# Patient Record
Sex: MALE | Race: WHITE | Employment: FULL TIME | URBAN - METROPOLITAN AREA
[De-identification: names, ages, dates, MRNs, and addresses within clinical notes are randomized per-mention and may not be internally consistent; named-entity substitution may affect disease eponyms.]

---

## 2017-01-24 ENCOUNTER — OFFICE VISIT (OUTPATIENT)
Dept: FAMILY MEDICINE | Facility: CLINIC | Age: 47
End: 2017-01-24
Payer: COMMERCIAL

## 2017-01-24 VITALS
SYSTOLIC BLOOD PRESSURE: 135 MMHG | HEART RATE: 90 BPM | WEIGHT: 173 LBS | BODY MASS INDEX: 26.68 KG/M2 | RESPIRATION RATE: 15 BRPM | TEMPERATURE: 99 F | DIASTOLIC BLOOD PRESSURE: 85 MMHG | OXYGEN SATURATION: 98 %

## 2017-01-24 DIAGNOSIS — H66.002 ACUTE SUPPURATIVE OTITIS MEDIA OF LEFT EAR WITHOUT SPONTANEOUS RUPTURE OF TYMPANIC MEMBRANE, RECURRENCE NOT SPECIFIED: Primary | ICD-10-CM

## 2017-01-24 PROCEDURE — 99213 OFFICE O/P EST LOW 20 MIN: CPT | Performed by: PHYSICIAN ASSISTANT

## 2017-01-24 RX ORDER — FLUTICASONE PROPIONATE 50 MCG
1-2 SPRAY, SUSPENSION (ML) NASAL DAILY
Qty: 3 BOTTLE | Refills: 0 | Status: SHIPPED | OUTPATIENT
Start: 2017-01-24 | End: 2017-06-15

## 2017-01-24 RX ORDER — AZITHROMYCIN 250 MG/1
TABLET, FILM COATED ORAL
Qty: 6 TABLET | Refills: 0 | Status: SHIPPED | OUTPATIENT
Start: 2017-01-24 | End: 2017-05-19

## 2017-01-24 ASSESSMENT — PAIN SCALES - GENERAL: PAINLEVEL: NO PAIN (0)

## 2017-01-24 NOTE — NURSING NOTE
"Chief Complaint   Patient presents with     Cough     c/o cough, congestion, post nasal drainage and plugged ear       Initial /85 mmHg  Pulse 90  Temp(Src) 99  F (37.2  C) (Oral)  Resp 15  Wt 173 lb (78.472 kg)  SpO2 98% Estimated body mass index is 26.68 kg/(m^2) as calculated from the following:    Height as of 11/11/16: 5' 7.5\" (1.715 m).    Weight as of this encounter: 173 lb (78.472 kg).  BP completed using cuff size: regular    Patient and/or MA were masked during rooming process  Charley Ruvalcaba MA        "

## 2017-01-24 NOTE — PROGRESS NOTES
SUBJECTIVE:                                                    Marcus Delacruz is a 46 year old male who presents to clinic today for the following health issues:      RESPIRATORY SYMPTOMS      Duration: 6 days    Description  nasal congestion, rhinorrhea, facial pain/pressure, cough, ear plugged left and headache    Severity: moderate    Accompanying signs and symptoms: None    History (predisposing factors):  none    Precipitating or alleviating factors: None    Therapies tried and outcome:  none             Allergies   Allergen Reactions     Azo [Phenazopyridine] Hives     Flomax [Quinazolines] Hives     Levaquin [Levofloxacin Hemihydrate] Hives     Amoxicillin Hives     Detrol [Tolterodine Tartrate] Hives       Past Medical History   Diagnosis Date     NO ACTIVE PROBLEMS      Kidney congenitally absent, 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 May double dosage if needed for ED     No current facility-administered medications on file prior to visit.    Social History   Substance Use Topics     Smoking status: Never Smoker      Smokeless tobacco: Former User      Comment: NON SMOKING HOME     Alcohol Use: Yes       ROS:  Consitutional: As above  ENT: As above  Respiratory: As above    OBJECTIVE:  /85 mmHg  Pulse 90  Temp(Src) 99  F (37.2  C) (Oral)  Resp 15  Wt 173 lb (78.472 kg)  SpO2 98%  GENERAL APPEARANCE: healthy, alert and no distress  EYES: conjunctiva clear  EARS: No cerumen.   Ear canals w/o erythema. L TM dull, red, retracted.    NOSE/MOUTH: Nose and mouth without ulcers, erythema or lesions  SINUSES: Mild  maxillary sinus tenderness.  THROAT: Mild erythema w/o tonsillar enlargement . No exudates  NECK: supple, nontender, no lymphadenopathy  RESP: lungs clear to auscultation - no rales, rhonchi or wheezes  CV: regular rates and rhythm, normal S1 S2, no murmur noted  NEURO: awake, alert      ASSESSMENT: Well appearing.    ICD-10-CM    1. Acute suppurative otitis media of left ear without spontaneous rupture of tympanic membrane, recurrence not specified H66.002 azithromycin (ZITHROMAX Z-JIM) 250 MG tablet     fluticasone (FLONASE) 50 MCG/ACT spray     PLAN:  Lots of rest and fluids.  RTC if any worsening symptoms or if not improving.    Latha Acevedo PA-C

## 2017-02-02 ENCOUNTER — TRANSFERRED RECORDS (OUTPATIENT)
Dept: HEALTH INFORMATION MANAGEMENT | Facility: CLINIC | Age: 47
End: 2017-02-02

## 2017-02-10 ENCOUNTER — TELEPHONE (OUTPATIENT)
Dept: FAMILY MEDICINE | Facility: CLINIC | Age: 47
End: 2017-02-10

## 2017-02-10 NOTE — TELEPHONE ENCOUNTER
[facility] attempted to contact patient to engage in Case Management Services due to his Chronic Illness - They were unsuccessful in reaching him.

## 2017-05-08 ENCOUNTER — TRANSFERRED RECORDS (OUTPATIENT)
Dept: HEALTH INFORMATION MANAGEMENT | Facility: CLINIC | Age: 47
End: 2017-05-08

## 2017-05-17 DIAGNOSIS — I10 ESSENTIAL HYPERTENSION WITH GOAL BLOOD PRESSURE LESS THAN 140/90: ICD-10-CM

## 2017-05-18 ENCOUNTER — TELEPHONE (OUTPATIENT)
Dept: FAMILY MEDICINE | Facility: CLINIC | Age: 47
End: 2017-05-18

## 2017-05-18 ENCOUNTER — TELEPHONE (OUTPATIENT)
Dept: NURSING | Facility: CLINIC | Age: 47
End: 2017-05-18

## 2017-05-18 RX ORDER — AMLODIPINE BESYLATE 5 MG/1
TABLET ORAL
Qty: 90 TABLET | Refills: 1 | Status: SHIPPED | OUTPATIENT
Start: 2017-05-18 | End: 2017-11-13

## 2017-05-18 NOTE — TELEPHONE ENCOUNTER
Call Type: Triage Call    Presenting Problem: Pt calling he wants to schedule an appt for  tomorrow in the clinic.  This past week he has had right sided flank  pain.  He has seen his urologist who has done x rays and CT scan and  can't find any kidney stones.  Pt has a hx of kidney stones.  Pt  reports the pain is very similar to kidney stone pain.  His  urologist referred him back to his primary to do further workup.  Pt  does not want to go to ER or U/C he wants office appt.  He reports  he is on pain pills and they help, he is nauseated but hydrated.  Triage Note:  Guideline Title: Abdominal or Pelvic Pain  Recommended Disposition: See Provider within 4 hours  Original Inclination: Would have called clinic  Override Disposition:  Intended Action: See /Bony Appt  Physician Contacted: No  Generalized abdominal pain that progresses to localized pain AND any of the  following: loss of appetite, vomiting starting after pain, any fever, OR unable  to carry out normal activities ?  YES  Pain described as deep, boring, or  tearing ? NO  Swallowed alkali or button battery ? NO  Swallowed sharp object (pin, needle, piece of glass) ? NO  Following a therapeutic OR elective  ? NO  New or worsening signs and symptoms that may indicate shock ? NO  Pregnant AND injury to abdomen ? NO  Pregnant AND abdominal pain/cramping OR back pain ? NO  Pregnant, less than 20 weeks gestation AND vaginal bleeding ? NO  Pregnant, more than 20 weeks gestation AND vaginal bleeding ? NO  Pregnant, gestation less than 20 weeks AND signs of labor ? NO  Pregnant, 20-37 weeks gestation AND signs of labor ? NO  Pregnant, gestation more than 37 weeks AND signs of labor ? NO  Pregnant AND heartburn ? NO  Female of childbearing age having unprotected sexual intercourse or inconsistently  using birth control AND absent, delayed or unusual menstrual period or abnormal  vaginal bleeding ? NO  Sudden onset of pain in testicle(s), groin, or lower  abdomen AND testicle(s)  swollen or tender to touch ? NO  Known or suspected food poisoning and symptoms do not improve after 24 hours of  home care ? NO  Following ingestion of toxic or caustic substance ? NO  Over 50 years of age AND new onset (first episode) unbearable back or abdominal  pain ? NO  Known abdominal aneurysm (swollen or ballooning aorta) AND sudden onset of  unbearable abdominal pain ? NO  Unable to reduce diagnosed hernia AND has severe pain, nausea/vomiting or any  fever ? NO  Abdominal pain and sickle cell disease, porphyria, or diabetes ? NO  Unbearable abdominal/pelvic pain ? NO  Temperature of 101.5 F (38.6 C) or greater that has not responded to 24 hours of  home care measures ? NO  Food or foreign body feels stuck in esophagus. ? NO  GI bleeding, more than streaks or scant amount of blood or passing black tarry  stool(s) ? NO  Chest discomfort associated with shortness of breath, sweating, odd heartbeats or  different heart rate, nausea, vomiting, lightheadedness, or fainting lasting 5 or  more minutes now or within the last hour ? NO  Chest pain spreading to the shoulders, neck, jaw, in one or both arms, stomach or  back lasting 5 or more minutes now or within the last hour. Pain is NOT  associated with taking a deep breath or a productive cough, movement, or touch to  a localized area. ? NO  Pressure, fullness, squeezing sensation or pain anywhere in the chest lasting 5 or  more minutes now or within the last hour. Pain is NOT associated with taking a  deep breath or a productive cough, movement, or touch to a localized area on the  chest. ? NO  Abdominal pain, any blood in vomitus or stool, abdominal bloating, new fever or  other cautionary symptoms began after GI surgery or procedure and is lasting  longer than defined in provider specified discharge information. ? NO  Swallowed an object longer than 2 inches (5 cm) or wider than 3/4 inch (2 cm) wide  ? NO  Generalized or localized pain  that becomes severe with movement, standing up  straight or coughing ? NO  Injury to abdomen or pelvis ? NO  New or worsening breathing problems that have not been evaluated OR without a  treatment plan ? NO  Recent childbirth or miscarriage ? NO  Any temperature elevation in an immunocompromised individual OR frail elderly with  signs of dehydration ? NO  Physician Instructions:  Care Advice: Another adult should drive.  Pain medication or laxatives should not be taken until symptoms are  evaluated.  Do not eat or drink anything until evaluated by provider.  Call  if signs and symptoms of shock develop (such as unable to  stand due to faintness, dizziness, or lightheadedness  new onset of confusion  slow to respond or difficult to awaken  skin is pale, gray, cool, or moist to touch  severe weakness  loss of consciousness).  Write down provider's name. List or place the following in a bag for  transport with the patient: current prescription and/or nonprescription  medications  alternative treatments, therapies and medications  and street drugs.

## 2017-05-19 ENCOUNTER — TELEPHONE (OUTPATIENT)
Dept: FAMILY MEDICINE | Facility: CLINIC | Age: 47
End: 2017-05-19

## 2017-05-19 ENCOUNTER — OFFICE VISIT (OUTPATIENT)
Dept: FAMILY MEDICINE | Facility: CLINIC | Age: 47
End: 2017-05-19
Payer: COMMERCIAL

## 2017-05-19 ENCOUNTER — RADIANT APPOINTMENT (OUTPATIENT)
Dept: GENERAL RADIOLOGY | Facility: CLINIC | Age: 47
End: 2017-05-19
Attending: FAMILY MEDICINE
Payer: COMMERCIAL

## 2017-05-19 VITALS
WEIGHT: 166 LBS | DIASTOLIC BLOOD PRESSURE: 82 MMHG | TEMPERATURE: 98.3 F | SYSTOLIC BLOOD PRESSURE: 120 MMHG | HEART RATE: 89 BPM | BODY MASS INDEX: 25.62 KG/M2 | OXYGEN SATURATION: 98 %

## 2017-05-19 DIAGNOSIS — R10.31 ABDOMINAL PAIN, RIGHT LOWER QUADRANT: Primary | ICD-10-CM

## 2017-05-19 DIAGNOSIS — R10.31 ABDOMINAL PAIN, RIGHT LOWER QUADRANT: ICD-10-CM

## 2017-05-19 DIAGNOSIS — N20.0 KIDNEY STONES: Primary | ICD-10-CM

## 2017-05-19 DIAGNOSIS — N52.8 OTHER MALE ERECTILE DYSFUNCTION: ICD-10-CM

## 2017-05-19 LAB
ERYTHROCYTE [DISTWIDTH] IN BLOOD BY AUTOMATED COUNT: 13.1 % (ref 10–15)
HCT VFR BLD AUTO: 47.6 % (ref 40–53)
HGB BLD-MCNC: 15.8 G/DL (ref 13.3–17.7)
MCH RBC QN AUTO: 29.4 PG (ref 26.5–33)
MCHC RBC AUTO-ENTMCNC: 33.2 G/DL (ref 31.5–36.5)
MCV RBC AUTO: 89 FL (ref 78–100)
PLATELET # BLD AUTO: 206 10E9/L (ref 150–450)
RBC # BLD AUTO: 5.37 10E12/L (ref 4.4–5.9)
WBC # BLD AUTO: 7.1 10E9/L (ref 4–11)

## 2017-05-19 PROCEDURE — 99214 OFFICE O/P EST MOD 30 MIN: CPT | Performed by: FAMILY MEDICINE

## 2017-05-19 PROCEDURE — 74020 XR ABDOMEN 2 VW: CPT

## 2017-05-19 PROCEDURE — 36415 COLL VENOUS BLD VENIPUNCTURE: CPT | Performed by: FAMILY MEDICINE

## 2017-05-19 PROCEDURE — 85027 COMPLETE CBC AUTOMATED: CPT | Performed by: FAMILY MEDICINE

## 2017-05-19 RX ORDER — SILDENAFIL CITRATE 20 MG/1
20 TABLET ORAL DAILY PRN
Qty: 20 TABLET | Refills: 1 | Status: SHIPPED | OUTPATIENT
Start: 2017-05-19 | End: 2017-05-31

## 2017-05-19 RX ORDER — OXYCODONE AND ACETAMINOPHEN 5; 325 MG/1; MG/1
1 TABLET ORAL EVERY 8 HOURS PRN
Qty: 12 TABLET | Refills: 0 | Status: SHIPPED | OUTPATIENT
Start: 2017-05-19 | End: 2017-11-02

## 2017-05-19 NOTE — PROGRESS NOTES
SUBJECTIVE:  Marcus Delacruz, a 46 year old male scheduled an appointment to discuss the following issues:  Abdominal pain/flank pain x2 weeks.   Seen urology and had ct and not related to kidney stones. ?possible hernia. No testicle pain/mass.  No dysuria. Nauseated. No emesis. No black or bloody stools. No fevers or chills. right LQ area.   Squeezing sensation. No changes with position.   Still with gall bladder and appendix. No family history gallstones. No diarrhea/constipation..  Would like small amount of pain med (percocet helpful in past) and refill on viagra. Denies chest pain or shortness of breath.   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     Kidney stones 9/16/2011     NO ACTIVE PROBLEMS        Past Surgical History:   Procedure Laterality Date     EXTRACORPOREAL SHOCK WAVE LITHOTRIPSY (ESWL)  .3.4.2016     GENITOURINARY SURGERY  Kidney Stones    21 Lithotripsy procedures     ORTHOPEDIC SURGERY  2015    right shoulder rotor cuff     TONSILLECTOMY  2008       Family History   Problem Relation Age of Onset     CANCER Father 68     pancreatic cancer     DIABETES Father      Other Cancer Father        Social History   Substance Use Topics     Smoking status: Never Smoker     Smokeless tobacco: Former User      Comment: NON SMOKING HOME     Alcohol use Yes       ROS:    OBJECTIVE:  /82  Pulse 89  Temp 98.3  F (36.8  C) (Oral)  Wt 166 lb (75.3 kg)  SpO2 98%  BMI 25.62 kg/m2  EXAM:  GENERAL APPEARANCE: healthy, alert and no distress  EYES: EOMI,  PERRL  HENT: ear canals and TM's normal and nose and mouth without ulcers or lesions  NECK: no adenopathy, no asymmetry, masses, or scars and thyroid normal to palpation  RESP: lungs clear to auscultation - no rales, rhonchi or wheezes  CV: regular rates and rhythm, normal S1 S2, no S3 or S4 and no murmur, click or rub -  ABDOMEN:  soft, nontender, no  HSM or masses and bowel sounds normal  ABDOMEN: mild tenderness right LQ. No guarding/rebound.   MS: extremities normal- no gross deformities noted, no evidence of inflammation in joints, FROM in all extremities.  SKIN: no suspicious lesions or rashes  NEURO: Normal strength and tone, sensory exam grossly normal, mentation intact and speech normal  PSYCH: mentation appears normal and affect normal/bright    ASSESSMENT / PLAN:  (R10.31) Abdominal pain, right lower quadrant  (primary encounter diagnosis)  Comment: hernia vs constipation vs ? Normal cbc  Plan: XR Abdomen 2 Views, CBC with platelets, GENERAL        SURG ADULT REFERRAL, oxyCODONE-acetaminophen         (PERCOCET) 5-325 MG per tablet        Follow-up surgery if not improving over weekend with miralax x2-3. Drinks lots of water so more fiber in diet. Might need colonoscopy if no improving too. .Call/email with questions/concerns. Reveiwed risks and side effects of medication. To ER if worsening pain/ emesis/ fevers or chills/etc.     (N52.8) Other male erectile dysfunction  Comment: viagra helpful in past  Plan: sildenafil (REVATIO/VIAGRA) 20 MG tablet        To ER if chest pain, shortness of breath , prolonged erections      Rj Mejia

## 2017-05-19 NOTE — TELEPHONE ENCOUNTER
Please call patient. Radiology read xray as possible distal urethral stone. At only 2mm should past if is in fact a stone. Stick would like patient to see surgery if not better next week and maybe call urology too.     Patient:   Marcus Delacruz   [phone] (home)     Provider:   Rj Mejia MD   Please call/evisit with questions or concern

## 2017-05-19 NOTE — NURSING NOTE
"Chief Complaint   Patient presents with     Abdominal Pain     Flank Pain       Initial /82  Pulse 89  Temp 98.3  F (36.8  C) (Oral)  Wt 166 lb (75.3 kg)  SpO2 98%  BMI 25.62 kg/m2 Estimated body mass index is 25.62 kg/(m^2) as calculated from the following:    Height as of 11/11/16: 5' 7.5\" (1.715 m).    Weight as of this encounter: 166 lb (75.3 kg).  Medication Reconciliation: complete   Jazlyn Swanson, MEGHAN    "

## 2017-05-19 NOTE — TELEPHONE ENCOUNTER
Pt notified of provider message as written.  Pt verbalized good understanding.  Jazlyn Borrego RN

## 2017-05-19 NOTE — MR AVS SNAPSHOT
After Visit Summary   5/19/2017    Marcus Delacruz    MRN: [id number]           Patient Information     Date Of Birth          1970        Visit Information        Provider Department      5/19/2017 9:00 AM Rj Mejia MD [facility]        Today's Diagnoses     Abdominal pain, right lower quadrant    -  1       Follow-ups after your visit        Additional Services     GENERAL SURG ADULT REFERRAL       Your provider has referred you to: FMG: [facility] [phone]   [email or web address]  FM[facility]: [facility] [phone]   [email or web address]  FMG: [facility] [phone]   [email or web address]    Please be aware that coverage of these services is subject to the terms and limitations of your health insurance plan.  Call member services at your health plan with any benefit or coverage questions.      Please bring the following with you to your appointment:    (1) Any X-Rays, CTs or MRIs which have been performed.  Contact the facility where they were done to arrange for  prior to your scheduled appointment.   (2) List of current medications   (3) This referral request   (4) Any documents/labs given to you for this referral                  Who to contact     If you have questions or need follow up information about today's clinic visit or your schedule please contact [facility] directly at [phone].  Normal or non-critical lab and imaging results will be communicated to you by MyChart, letter or phone within 4 business days after the clinic has received the results. If you do not hear from us within 7 days, please contact the clinic through MyChart or phone. If you have a critical or abnormal lab result, we will notify you by phone as soon as possible.  Submit refill requests through [employer] or call  your pharmacy and they will forward the refill request to us. Please allow 3 business days for your refill to be completed.          Additional Information About Your Visit        Arc[employer] Information     [facility] gives you secure access to your electronic health record. If you see a primary care provider, you can also send messages to your care team and make appointments. If you have questions, please call your primary care clinic.  If you do not have a primary care provider, please call [phone] and they will assist you.        Care EveryWhere ID     This is your Care EveryWhere ID. This could be used by other organizations to access your [place] medical records  [id number]        Your Vitals Were     Pulse Temperature Pulse Oximetry BMI (Body Mass Index)          89 98.3  F (36.8  C) (Oral) 98% 25.62 kg/m2         Blood Pressure from Last 3 Encounters:   05/19/17 120/82   01/24/17 135/85   11/11/16 136/79    Weight from Last 3 Encounters:   05/19/17 166 lb (75.3 kg)   01/24/17 173 lb (78.5 kg)   11/11/16 177 lb (80.3 kg)              We Performed the Following     CBC with platelets     GENERAL SURG ADULT REFERRAL        Primary Care Provider Office Phone # Fax #    Rj Mejia [phone]       [facility] [address] [facility] [address]        Thank you!     Thank you for choosing [facility]  for your care. Our goal is always to provide you with excellent care. Hearing back from our patients is one way we can continue to improve our services. Please take a few minutes to complete the written survey that you may receive in the mail after your visit with us. Thank you!             Your Updated Medication List - Protect others around you: Learn how to safely use, store and throw away your medicines at www.disposemymeds.org.          This list is accurate as of: 5/19/17  9:40 AM.  Always use your most recent med list.                   Brand Name Dispense  Instructions for use    amLODIPine 5 MG tablet    NORVASC    90 tablet    TAKE 1 TABLET DAILY FOR    BLOOD PRESSURE WITH        BREAKFAST       fluticasone 50 MCG/ACT spray    FLONASE    3 Bottle    Spray 1-2 sprays into both nostrils daily       sildenafil 20 MG tablet    REVATIO/VIAGRA    20 tablet    Take 1 tablet (20 mg) by mouth daily as needed For ed  Never use with nitroglycerin, terazosin or doxazosin. May double dosage if needed for ED

## 2017-05-26 ENCOUNTER — OFFICE VISIT (OUTPATIENT)
Dept: SURGERY | Facility: CLINIC | Age: 47
End: 2017-05-26
Payer: COMMERCIAL

## 2017-05-26 VITALS
WEIGHT: 169.4 LBS | DIASTOLIC BLOOD PRESSURE: 100 MMHG | RESPIRATION RATE: 16 BRPM | HEIGHT: 68 IN | BODY MASS INDEX: 25.67 KG/M2 | SYSTOLIC BLOOD PRESSURE: 174 MMHG | OXYGEN SATURATION: 98 % | HEART RATE: 86 BPM

## 2017-05-26 DIAGNOSIS — K42.9 UMBILICAL HERNIA WITHOUT OBSTRUCTION AND WITHOUT GANGRENE: ICD-10-CM

## 2017-05-26 DIAGNOSIS — K40.20 BILATERAL INGUINAL HERNIA WITHOUT OBSTRUCTION OR GANGRENE, RECURRENCE NOT SPECIFIED: Primary | ICD-10-CM

## 2017-05-26 PROCEDURE — 99204 OFFICE O/P NEW MOD 45 MIN: CPT | Performed by: SURGERY

## 2017-05-26 RX ORDER — OXYCODONE AND ACETAMINOPHEN 5; 325 MG/1; MG/1
1 TABLET ORAL EVERY 4 HOURS PRN
Qty: 30 TABLET | Refills: 0 | Status: SHIPPED | OUTPATIENT
Start: 2017-05-26 | End: 2017-06-15

## 2017-05-26 ASSESSMENT — PAIN SCALES - GENERAL: PAINLEVEL: EXTREME PAIN (8)

## 2017-05-26 NOTE — NURSING NOTE
"Chief Complaint   Patient presents with     Abdominal Pain     Located in lower right quadrant x 3 weeks       Initial BP (!) 174/100 (BP Location: Right arm, Patient Position: Chair, Cuff Size: Adult Regular)  Pulse 86  Resp 16  Ht 1.715 m (5' 7.5\")  Wt 76.8 kg (169 lb 6.4 oz)  SpO2 98%  BMI 26.14 kg/m2 Estimated body mass index is 26.14 kg/(m^2) as calculated from the following:    Height as of this encounter: 1.715 m (5' 7.5\").    Weight as of this encounter: 76.8 kg (169 lb 6.4 oz).  Medication Reconciliation: complete     Navneet Kwon CMA      "

## 2017-05-26 NOTE — NURSING NOTE
Pre-certification completed. Scheduling will contact patient to schedule procedure.    Nvaneet Kwon, MEGHAN

## 2017-05-26 NOTE — MR AVS SNAPSHOT
"              After Visit Summary   5/26/2017    Marcus Delacruz    MRN: [id number]           Patient Information     Date Of Birth          1970        Visit Information        Provider Department      5/26/2017 8:30 AM Filipe Rodriguez MD [facility]        Today's Diagnoses     Bilateral inguinal hernia without obstruction or gangrene, recurrence not specified    -  1    Umbilical hernia without obstruction and without gangrene           Follow-ups after your visit        Who to contact     If you have questions or need follow up information about today's clinic visit or your schedule please contact [facility] directly at [phone].  Normal or non-critical lab and imaging results will be communicated to you by tarpipehart, letter or phone within 4 business days after the clinic has received the results. If you do not hear from us within 7 days, please contact the clinic through tarpipehart or phone. If you have a critical or abnormal lab result, we will notify you by phone as soon as possible.  Submit refill requests through [employer] or call your pharmacy and they will forward the refill request to us. Please allow 3 business days for your refill to be completed.          Additional Information About Your Visit        MyChart Information     [employer] gives you secure access to your electronic health record. If you see a primary care provider, you can also send messages to your care team and make appointments. If you have questions, please call your primary care clinic.  If you do not have a primary care provider, please call [phone] and they will assist you.        Care EveryWhere ID     This is your Care EveryWhere ID. This could be used by other organizations to access your [place] medical records  [id number]        Your Vitals Were     Pulse Respirations Height Pulse Oximetry BMI (Body Mass Index)       86 16 1.715 m (5' 7.5\") 98% 26.14 kg/m2        Blood Pressure from " Last 3 Encounters:   05/26/17 (!) 174/100   05/19/17 120/82   01/24/17 135/85    Weight from Last 3 Encounters:   05/26/17 76.8 kg (169 lb 6.4 oz)   05/19/17 75.3 kg (166 lb)   01/24/17 78.5 kg (173 lb)              Today, you had the following     No orders found for display         Today's Medication Changes          These changes are accurate as of: 5/26/17  8:57 AM.  If you have any questions, ask your nurse or doctor.               These medicines have changed or have updated prescriptions.        Dose/Directions    * oxyCODONE-acetaminophen 5-325 MG per tablet   Commonly known as:  PERCOCET   This may have changed:  Another medication with the same name was added. Make sure you understand how and when to take each.   Used for:  Abdominal pain, right lower quadrant   Changed by:  Rj Mejia MD        Dose:  1 tablet   Take 1 tablet by mouth every 8 hours as needed for pain   Quantity:  12 tablet   Refills:  0       * oxyCODONE-acetaminophen 5-325 MG per tablet   Commonly known as:  PERCOCET   This may have changed:  You were already taking a medication with the same name, and this prescription was added. Make sure you understand how and when to take each.   Used for:  Bilateral inguinal hernia without obstruction or gangrene, recurrence not specified   Changed by:  Filipe Rodriguez MD        Dose:  1 tablet   Take 1 tablet by mouth every 4 hours as needed for pain   Quantity:  30 tablet   Refills:  0       * Notice:  This list has 2 medication(s) that are the same as other medications prescribed for you. Read the directions carefully, and ask your doctor or other care provider to review them with you.         Where to get your medicines      Some of these will need a paper prescription and others can be bought over the counter.  Ask your nurse if you have questions.     Bring a paper prescription for each of these medications     oxyCODONE-acetaminophen 5-325 MG per tablet                Primary  Care Provider Office Phone # Fax #    Rj Mejia [phone]       [facility] [address] [address] [address]        Thank you!     Thank you for choosing [facility]  for your care. Our goal is always to provide you with excellent care. Hearing back from our patients is one way we can continue to improve our services. Please take a few minutes to complete the written survey that you may receive in the mail after your visit with us. Thank you!             Your Updated Medication List - Protect others around you: Learn how to safely use, store and throw away your medicines at www.disposemymeds.org.          This list is accurate as of: 5/26/17  8:57 AM.  Always use your most recent med list.                   Brand Name Dispense Instructions for use    amLODIPine 5 MG tablet    NORVASC    90 tablet    TAKE 1 TABLET DAILY FOR    BLOOD PRESSURE WITH        BREAKFAST       fluticasone 50 MCG/ACT spray    FLONASE    3 Bottle    Spray 1-2 sprays into both nostrils daily       * oxyCODONE-acetaminophen 5-325 MG per tablet    PERCOCET    12 tablet    Take 1 tablet by mouth every 8 hours as needed for pain       * oxyCODONE-acetaminophen 5-325 MG per tablet    PERCOCET    30 tablet    Take 1 tablet by mouth every 4 hours as needed for pain       sildenafil 20 MG tablet    REVATIO/VIAGRA    20 tablet    Take 1 tablet (20 mg) by mouth daily as needed For ed  Never use with nitroglycerin, terazosin or doxazosin. May double dosage if needed for ED       * Notice:  This list has 2 medication(s) that are the same as other medications prescribed for you. Read the directions carefully, and ask your doctor or other care provider to review them with you.

## 2017-05-26 NOTE — PROGRESS NOTES
"Patient seen in consultation for RLQ abdominal pain by Rj Mejia    HPI:  Patient is a 46 year old male  with complaints of RLQ/groin abdominal pain  The patient noticed the symptoms about 3 weeks ago.    Initially thought was kidney stone but visited with urologist and seemed like that was not the case  Can feel something in the groin that goes in and out when he pushes on it  Left side feels normal  pain medication (percocet) makes the episode better. Resting/laying down as well  Patient has family history of hernia problems in father he thinks.  No previous hernia repairs    Review Of Systems    Skin: negative  Ears/Nose/Throat: negative  Respiratory: No shortness of breath, dyspnea on exertion, cough, or hemoptysis  Cardiovascular: negative  Gastrointestinal: poor appetite- gets hungry but \"feels sick\" after eating, no vomiting, bowels moving normally  Genitourinary: negative. 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     Social History Main Topics     Smoking status: Never Smoker     Smokeless tobacco: Former User      Comment: NON SMOKING HOME     Alcohol use Yes     Drug use: No     Sexual activity: Yes     Partners: Female     Birth control/ " "protection: None     Other Topics Concern     Parent/Sibling W/ Cabg, Mi Or Angioplasty Before 65f 55m? No      Service No     Blood Transfusions No     Caffeine Concern No     Occupational Exposure No     Hobby Hazards No     Sleep Concern No     Stress Concern No     Weight Concern No     Special Diet No     Back Care No     Exercise No     Bike Helmet No     Seat Belt Yes     Self-Exams No     Social History Narrative       Current Outpatient Prescriptions   Medication Sig Dispense Refill     amLODIPine (NORVASC) 5 MG tablet TAKE 1 TABLET DAILY FOR    BLOOD PRESSURE WITH        BREAKFAST 90 tablet 1     sildenafil (REVATIO/VIAGRA) 20 MG tablet Take 1 tablet (20 mg) by mouth daily as needed For ed  Never use with nitroglycerin, terazosin or doxazosin. May double dosage if needed for ED (Patient not taking: Reported on 5/26/2017) 20 tablet 1     oxyCODONE-acetaminophen (PERCOCET) 5-325 MG per tablet Take 1 tablet by mouth every 8 hours as needed for pain (Patient not taking: Reported on 5/26/2017) 12 tablet 0     fluticasone (FLONASE) 50 MCG/ACT spray Spray 1-2 sprays into both nostrils daily (Patient not taking: Reported on 5/19/2017) 3 Bottle 0       Medications and history reviewed    Physical exam:  Vitals: BP (!) 174/100 (BP Location: Right arm, Patient Position: Chair, Cuff Size: Adult Regular)  Pulse 86  Resp 16  Ht 1.715 m (5' 7.5\")  Wt 76.8 kg (169 lb 6.4 oz)  SpO2 98%  BMI 26.14 kg/m2  BMI= Body mass index is 26.14 kg/(m^2).    Constitutional: healthy, alert and no distress  Head: Normocephalic. No masses, lesions, tenderness or abnormalities  Cardiovascular: negative, PMI normal. No lifts, heaves, or thrills. RRR. No murmurs, clicks gallops or rub  Respiratory: negative, Percussion normal. Good diaphragmatic excursion. Lungs clear  Gastrointestinal: Abdomen soft, non-tender. BS normal. No masses, organomegaly, positive findings: umbilical hernia- tender, feels like small amount of " incarcerated fat  : Normal external genitalia without lesions and bilateral inguinal hernia right>left, reducible.  Musculoskeletal: extremities normal- no gross deformities noted, gait normal and normal muscle tone  Skin: no suspicious lesions or rashes  Psychiatric: mentation appears normal and affect normal/bright  Hematologic/Lymphatic/Immunologic: Normal cervical lymph nodes  Patient able to get up on table without difficulty.      Assessment:     ICD-10-CM    1. Bilateral inguinal hernia without obstruction or gangrene, recurrence not specified K40.20 oxyCODONE-acetaminophen (PERCOCET) 5-325 MG per tablet   2. Umbilical hernia without obstruction and without gangrene K42.9      Plan: Discussed open and MIS approaches. With multiple hernia I recommended MIS with the robot and getting all done in one setting. Patient is agreeable to this. Will work on HESIODO. Hernia is causing fair amount of pain so will get him some percocets to help- hernia is not urgent/emergent  Risks of surgery discussed including, but not limited to bleeding, infection, recurrence, damage to nerves and what is in the hernia sac.  Risks of anesthesia also discussed..  Although mesh is a better long term repair if it gets infected it must be removed.  If there is evidence of an infection at time of surgery it will be cancelled and rescheduled to when well.  If the patient is a smoker I did discuss increase risk of recurrence and more pain with the cough.  If the patient is willing to quit smoking would encourage to do so and start at least a week before surgery.  However, if patient is not going to quit then must understand that his repair is more likely to fail.  Discussed massaging hernia back in and using ice if becomes more painful.  If not able to reduce then go to emergency room.      Filipe Rodriguez MD

## 2017-05-31 RX ORDER — SILDENAFIL CITRATE 20 MG/1
20 TABLET ORAL DAILY PRN
Qty: 20 TABLET | Refills: 1 | Status: SHIPPED | OUTPATIENT
Start: 2017-05-31 | End: 2018-11-15

## 2017-06-09 NOTE — PROGRESS NOTES
[facility]  [address] [facility] [id number] [phone]  Dept: [phone]    PRE-OP EVALUATION:  Today's date: 6/15/2017    Marcus Delacruz (: 1970) presents for pre-operative evaluation assessment as requested by Filipe Salguero.  He requires evaluation and anesthesia risk assessment prior to undergoing surgery/procedure for treatment of hernia .  Proposed procedure: hernia     Date of Surgery/ Procedure: 17  Time of Surgery/ Procedure: 7:45am  Hospital/Surgical Facility:  Or  Fax number for surgical facility:   Primary Physician: Rj Mejia  Type of Anesthesia Anticipated: to be determined  Lap hernia repair. S/p multiple lithotripsy and rotator - did well in past with surgery.     Patient has a Health Care Directive or Living Will:  NO    1. NO - Do you have a history of heart attack, stroke, stent, bypass or surgery on an artery in the head, neck, heart or legs?  2. NO - Do you ever have any pain or discomfort in your chest?  3. NO - Do you have a history of  Heart Failure?  4. NO - Are you troubled by shortness of breath when: walking on the level, up a slight hill or at night?  5. NO - Do you currently have a cold, bronchitis or other respiratory infection?  6. NO - Do you have a cough, shortness of breath or wheezing?  7. NO - Do you sometimes get pains in the calves of your legs when you walk?  8. NO - Do you or anyone in your family have previous history of blood clots?  9. NO - Do you or does anyone in your family have a serious bleeding problem such as prolonged bleeding following surgeries or cuts?  10. NO - Have you ever had problems with anemia or been told to take iron pills?  11. NO - Have you had any abnormal blood loss such as black, tarry or bloody stools, or abnormal vaginal bleeding?  12. NO - Have you ever had a blood transfusion?  13. NO - Have you or any of your relatives ever had problems with anesthesia?  14. NO - Do you have sleep  apnea, excessive snoring or daytime drowsiness?  15. NO - Do you have any prosthetic heart valves?  16. NO - Do you have prosthetic joints?  17. NO - Is there any chance that you may be pregnant?      HPI:                                                      Brief HPI related to upcoming procedure: hernia/groin pain      HYPERTENSION - Patient has longstanding history of mod-severe HTN , currently denies any symptoms referable to elevated blood pressure. Specifically denies chest pain, palpitations, dyspnea, orthopnea, PND or peripheral edema. Blood pressure readings have been in normal range. Current medication regimen is as listed below. Patient denies any side effects of medication.                                                                                                                                                                                          .    MEDICAL HISTORY:                                                      Patient Active Problem List    Diagnosis Date Noted     Other male erectile dysfunction 11/11/2016     Priority: Medium     Hyperglycemia 11/11/2016     Priority: Medium     Special screening for malignant neoplasm of prostate 10/22/2015     Priority: Medium     Rotator cuff tear 05/12/2015     Priority: Medium     Hypertension goal BP (blood pressure) < 140/90 10/09/2014     Priority: Medium     Microhematuria 10/09/2014     Priority: Medium     Elevated blood pressure reading without diagnosis of hypertension 09/19/2012     Priority: Medium     Pain in joint, lower leg 10/20/2011     Priority: Medium     Kidney congenitally absent, 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     Priority: Medium     Kidney stones 09/16/2011     Priority: Medium     CARDIOVASCULAR SCREENING; LDL GOAL LESS THAN 160 10/31/2010     Priority: Medium      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 May double dosage if needed for ED 20 tablet 1     oxyCODONE-acetaminophen (PERCOCET) 5-325 MG per tablet Take 1 tablet by mouth every 4 hours as needed for pain 30 tablet 0     oxyCODONE-acetaminophen (PERCOCET) 5-325 MG per tablet Take 1 tablet by mouth every 8 hours as needed for pain (Patient not taking: Reported on 5/26/2017) 12 tablet 0     amLODIPine (NORVASC) 5 MG tablet TAKE 1 TABLET DAILY FOR    BLOOD PRESSURE WITH        BREAKFAST 90 tablet 1     fluticasone (FLONASE) 50 MCG/ACT spray Spray 1-2 sprays into both nostrils daily (Patient not taking: Reported on 5/19/2017) 3 Bottle 0     OTC products: None, except as noted above      Allergies   Allergen Reactions     Azo [Phenazopyridine] Hives     Flomax [Quinazolines] Hives     Levaquin [Levofloxacin Hemihydrate] Hives     Amoxicillin Hives     Detrol [Tolterodine Tartrate] Hives      Latex Allergy: NO    Social History   Substance Use Topics     Smoking status: Never Smoker     Smokeless tobacco: Former User      Comment: NON SMOKING HOME     Alcohol use Yes     History   Drug Use No       REVIEW OF SYSTEMS:                                                    C: NEGATIVE for fever, chills, change in weight  INTEGUMENTARY/SKIN: NEGATIVE for worrisome rashes, moles or lesions  E/M: NEGATIVE for ear, mouth and throat problems  R: NEGATIVE for significant cough or SOB  CV: NEGATIVE for chest pain, palpitations or peripheral edema, good exercise  tolerance.  GI: some nausea with abdominal pain, heartburn, or change in bowel habits. No black or bloody stools.   : negative for dysuria, hematuria, decreased urinary stream, erectile dysfunction, kidney stones one year ago  MUSCULOSKELETAL: NEGATIVE for significant arthralgias or myalgia  NEURO: NEGATIVE for weakness, dizziness or paresthesias  ENDOCRINE: NEGATIVE for temperature intolerance, skin/hair changes  HEME/ALLERGY/IMMUNE: NEGATIVE for bleeding problems  PSYCHIATRIC: NEGATIVE for changes in mood or affect    EXAM:                                                    BP (!) 145/91 (Cuff Size: Adult Regular)  Pulse 90  Temp 97.9  F (36.6  C) (Oral)  Wt 161 lb (73 kg)  SpO2 100%  BMI 24.84 kg/m2      GENERAL APPEARANCE: healthy, alert and no distress  HENT: ear canals and TM's normal and nose and mouth without ulcers or lesions  RESP: lungs clear to auscultation - no rales, rhonchi or wheezes  CV: regular rate and rhythm, normal S1 S2, no S3 or S4 and no murmur, click or rub   ABDOMEN: soft, nontender, no HSM or masses and bowel sounds normal  MS: extremities normal- no gross deformities noted  NEURO: Normal strength and tone, sensory exam grossly normal, mentation intact and speech normal  PSYCH: mentation appears normal and affect normal/bright    DIAGNOSTICS:                                                    No EKG required <50 and normal ekg 2 years ago.     Recent Labs   Lab Test  05/19/17   0927  11/08/16   0835  11/01/16   0929  08/24/16   1122   HGB  15.8  15.4   --    --    PLT  206  214   --    --    NA   --    --   138  140   POTASSIUM   --    --   4.2  4.2   CR   --    --   0.98  0.89        IMPRESSION:                                                    Reason for surgery/procedure: hernia/groin pain// hernia repair  Diagnosis/reason for consult: htn/groin pain// fitness for surgery  Ok for surgery. Denies chest pain or shortness of breath. Cbc ok last month and normal ekg last year.  Will  have patient double norvasc from 5 to 10mg until after surgery - blood pressure up likely form pain.  Refill percocet given and avoid heavy lifting. To ER if acutely worsening pain/swellling. Expected course and warning signs reviewed. Call/email with questions/concerns.   Reveiwed risks and side effects of medication  Patient wants sooner surgery and doesn't mind if not robotic. Will ask surgery for help with this.   The proposed surgical procedure is considered LOW risk.    REVISED CARDIAC RISK INDEX  The patient has the following serious cardiovascular risks for perioperative complications such as (MI, PE, VFib and 3  AV Block):  No serious cardiac risks  INTERPRETATION: 1 risks: Class II (low risk - 0.9% complication rate)    The patient has the following additional risks for perioperative complications:  No identified additional risks        RECOMMENDATIONS:                                                      --Consult hospital rounder / IM to assist post-op medical management    --Patient is to take all scheduled medications on the day of surgery EXCEPT for modifications listed below. No asa/nsaids or ALCOHOL    APPROVAL GIVEN to proceed with proposed procedure, without further diagnostic evaluation       Signed Electronically by: Rj Mejia MD    Copy of this evaluation report is provided to requesting physician.    [facility] Preop Guidelines

## 2017-06-15 ENCOUNTER — OFFICE VISIT (OUTPATIENT)
Dept: FAMILY MEDICINE | Facility: CLINIC | Age: 47
End: 2017-06-15
Payer: COMMERCIAL

## 2017-06-15 VITALS
SYSTOLIC BLOOD PRESSURE: 145 MMHG | DIASTOLIC BLOOD PRESSURE: 91 MMHG | TEMPERATURE: 97.9 F | BODY MASS INDEX: 24.84 KG/M2 | WEIGHT: 161 LBS | HEART RATE: 90 BPM | OXYGEN SATURATION: 100 %

## 2017-06-15 DIAGNOSIS — K40.20 BILATERAL INGUINAL HERNIA WITHOUT OBSTRUCTION OR GANGRENE, RECURRENCE NOT SPECIFIED: ICD-10-CM

## 2017-06-15 DIAGNOSIS — Z01.818 PREOP GENERAL PHYSICAL EXAM: Primary | ICD-10-CM

## 2017-06-15 DIAGNOSIS — I10 HYPERTENSION GOAL BP (BLOOD PRESSURE) < 140/90: ICD-10-CM

## 2017-06-15 PROCEDURE — 99214 OFFICE O/P EST MOD 30 MIN: CPT | Performed by: FAMILY MEDICINE

## 2017-06-15 RX ORDER — OXYCODONE AND ACETAMINOPHEN 5; 325 MG/1; MG/1
1 TABLET ORAL EVERY 4 HOURS PRN
Qty: 45 TABLET | Refills: 0 | Status: SHIPPED | OUTPATIENT
Start: 2017-06-15 | End: 2017-06-26

## 2017-06-15 NOTE — NURSING NOTE
"Chief Complaint   Patient presents with     Pre-Op Exam       Initial BP (!) 149/95 (Cuff Size: Adult Regular)  Pulse 90  Temp 97.9  F (36.6  C) (Oral)  Wt 161 lb (73 kg)  SpO2 100%  BMI 24.84 kg/m2 Estimated body mass index is 24.84 kg/(m^2) as calculated from the following:    Height as of 5/26/17: 5' 7.5\" (1.715 m).    Weight as of this encounter: 161 lb (73 kg).  Medication Reconciliation: complete    AMY Fontenot MA    "

## 2017-06-15 NOTE — MR AVS SNAPSHOT
After Visit Summary   6/15/2017    Marcus Delacruz    MRN: [id number]           Patient Information     Date Of Birth          1970        Visit Information        Provider Department      6/15/2017 1:00 PM Rj Mejia MD [facility]        Today's Diagnoses     Preop general physical exam    -  1    Bilateral inguinal hernia without obstruction or gangrene, recurrence not specified        Hypertension goal BP (blood pressure) < 140/90          Care Instructions      Before Your Surgery      Call your surgeon if there is any change in your health. This includes signs of a cold or flu (such as a sore throat, runny nose, cough, rash or fever).    Do not smoke, drink alcohol or take over the counter medicine (unless your surgeon or primary care doctor tells you to) for the 24 hours before and after surgery.    If you take prescribed drugs: Follow your doctor s orders about which medicines to take and which to stop until after surgery.    Eating and drinking prior to surgery: follow the instructions from your surgeon    Take a shower or bath the night before surgery. Use the soap your surgeon gave you to gently clean your skin. If you do not have soap from your surgeon, use your regular soap. Do not shave or scrub the surgery site.  Wear clean pajamas and have clean sheets on your bed.           Follow-ups after your visit        Your next 10 appointments already scheduled     Jul 19, 2017  7:30 AM CDT   Post Op with Filipe Rodriguez MD   [facility]    [address] [place] [id number] [phone]              Who to contact     If you have questions or need follow up information about today's clinic visit or your schedule please contact [facility] directly at [phone].  Normal or non-critical lab and imaging results will be communicated to you by MyChart, letter or phone within 4 business days after  the clinic has received the results. If you do not hear from us within 7 days, please contact the clinic through [employer] or phone. If you have a critical or abnormal lab result, we will notify you by phone as soon as possible.  Submit refill requests through [employer] or call your pharmacy and they will forward the refill request to us. Please allow 3 business days for your refill to be completed.          Additional Information About Your Visit        Vide[employer] Information     [employer] gives you secure access to your electronic health record. If you see a primary care provider, you can also send messages to your care team and make appointments. If you have questions, please call your primary care clinic.  If you do not have a primary care provider, please call [phone] and they will assist you.        Care EveryWhere ID     This is your Care EveryWhere ID. This could be used by other organizations to access your [facility] medical records  [id number]        Your Vitals Were     Pulse Temperature Pulse Oximetry BMI (Body Mass Index)          90 97.9  F (36.6  C) (Oral) 100% 24.84 kg/m2         Blood Pressure from Last 3 Encounters:   06/15/17 (!) 145/91   05/26/17 (!) 174/100   05/19/17 120/82    Weight from Last 3 Encounters:   06/15/17 161 lb (73 kg)   05/26/17 169 lb 6.4 oz (76.8 kg)   05/19/17 166 lb (75.3 kg)              Today, you had the following     No orders found for display         Where to get your medicines      Some of these will need a paper prescription and others can be bought over the counter.  Ask your nurse if you have questions.     Bring a paper prescription for each of these medications     oxyCODONE-acetaminophen 5-325 MG per tablet          Primary Care Provider Office Phone # Fax #    Rj Mejia [phone]       [facility] [address] [address] [facility] [address]        Thank you!     Thank you for choosing [facility]  for your care. Our  goal is always to provide you with excellent care. Hearing back from our patients is one way we can continue to improve our services. Please take a few minutes to complete the written survey that you may receive in the mail after your visit with us. Thank you!             Your Updated Medication List - Protect others around you: Learn how to safely use, store and throw away your medicines at www.disposemymeds.org.          This list is accurate as of: 6/15/17  1:41 PM.  Always use your most recent med list.                   Brand Name Dispense Instructions for use    amLODIPine 5 MG tablet    NORVASC    90 tablet    TAKE 1 TABLET DAILY FOR    BLOOD PRESSURE WITH        BREAKFAST       * oxyCODONE-acetaminophen 5-325 MG per tablet    PERCOCET    12 tablet    Take 1 tablet by mouth every 8 hours as needed for pain       * oxyCODONE-acetaminophen 5-325 MG per tablet    PERCOCET    45 tablet    Take 1 tablet by mouth every 4 hours as needed for pain       sildenafil 20 MG tablet    REVATIO/VIAGRA    20 tablet    Take 1 tablet (20 mg) by mouth daily as needed For ed  Never use with nitroglycerin, terazosin or doxazosin. May double dosage if needed for ED       * Notice:  This list has 2 medication(s) that are the same as other medications prescribed for you. Read the directions carefully, and ask your doctor or other care provider to review them with you.

## 2017-06-26 ENCOUNTER — TELEPHONE (OUTPATIENT)
Dept: FAMILY MEDICINE | Facility: CLINIC | Age: 47
End: 2017-06-26

## 2017-06-26 DIAGNOSIS — K40.20 BILATERAL INGUINAL HERNIA WITHOUT OBSTRUCTION OR GANGRENE, RECURRENCE NOT SPECIFIED: ICD-10-CM

## 2017-06-26 RX ORDER — OXYCODONE AND ACETAMINOPHEN 5; 325 MG/1; MG/1
1 TABLET ORAL EVERY 4 HOURS PRN
Qty: 40 TABLET | Refills: 0 | Status: SHIPPED | OUTPATIENT
Start: 2017-06-26 | End: 2017-11-02

## 2017-06-26 NOTE — TELEPHONE ENCOUNTER
Controlled Substance Refill Request for percocet  Problem List Complete:  No     PROVIDER TO CONSIDER COMPLETION OF PROBLEM LIST AND OVERVIEW/CONTROLLED SUBSTANCE AGREEMENT    Last Written Prescription Date:  6-15-17  Last Fill Quantity: 45,   # refills: 0    Last Office Visit with [facility] primary care provider: 6-15-17    Future Office visit:     Controlled substance agreement on file: No.     Processing:  Patient will  in clinic pharmacy.   checked in past 6 months?  Yes 6-26-17   Jazlyn Borrego RN

## 2017-06-26 NOTE — TELEPHONE ENCOUNTER
Patient is calling to request refill RX: oxyCODONE-acetaminophen (PERCOCET) 5-325 MG per tablet. Thank you.

## 2017-06-27 NOTE — TELEPHONE ENCOUNTER
Brought RX to the [facility]. Called and informed patient.Samreen Garner MA/TC

## 2017-07-06 ENCOUNTER — TRANSFERRED RECORDS (OUTPATIENT)
Dept: HEALTH INFORMATION MANAGEMENT | Facility: CLINIC | Age: 47
End: 2017-07-06

## 2017-07-10 ENCOUNTER — TELEPHONE (OUTPATIENT)
Dept: SURGERY | Facility: CLINIC | Age: 47
End: 2017-07-10

## 2017-07-10 DIAGNOSIS — G89.18 ACUTE POST-OPERATIVE PAIN: Primary | ICD-10-CM

## 2017-07-10 RX ORDER — OXYCODONE AND ACETAMINOPHEN 5; 325 MG/1; MG/1
1-2 TABLET ORAL EVERY 4 HOURS PRN
Qty: 30 TABLET | Refills: 0 | Status: SHIPPED | OUTPATIENT
Start: 2017-07-10 | End: 2017-11-02

## 2017-07-10 NOTE — TELEPHONE ENCOUNTER
Patient told it would be ok to  at [facility] this pm. Charlene Hooks Cma

## 2017-07-10 NOTE — TELEPHONE ENCOUNTER
Reason for Call:  Other prescription    Detailed comments: Patient called to request an refill for the prescription Percocet, as he is currently out , please call once the hard scrip is ready for pick at the clinic's     Phone Number Patient can be reached at: Home number on file [phone] (home)    Best Time: today    Can we leave a detailed message on this number? YES    Call taken on 7/10/2017 at 8:23 AM by Juan Antonio Gresham

## 2017-07-19 ENCOUNTER — OFFICE VISIT (OUTPATIENT)
Dept: SURGERY | Facility: CLINIC | Age: 47
End: 2017-07-19
Payer: COMMERCIAL

## 2017-07-19 VITALS
BODY MASS INDEX: 24.4 KG/M2 | SYSTOLIC BLOOD PRESSURE: 155 MMHG | HEART RATE: 85 BPM | DIASTOLIC BLOOD PRESSURE: 100 MMHG | WEIGHT: 161 LBS | HEIGHT: 68 IN

## 2017-07-19 DIAGNOSIS — Z09 S/P UMBILICAL HERNIA REPAIR, FOLLOW-UP EXAM: ICD-10-CM

## 2017-07-19 DIAGNOSIS — Z09 S/P BILATERAL INGUINAL HERNIA REPAIR, FOLLOW-UP EXAM: Primary | ICD-10-CM

## 2017-07-19 DIAGNOSIS — Z87.19 S/P BILATERAL INGUINAL HERNIA REPAIR, FOLLOW-UP EXAM: Primary | ICD-10-CM

## 2017-07-19 PROCEDURE — 99024 POSTOP FOLLOW-UP VISIT: CPT | Performed by: SURGERY

## 2017-07-19 RX ORDER — OXYCODONE AND ACETAMINOPHEN 5; 325 MG/1; MG/1
1-2 TABLET ORAL EVERY 4 HOURS PRN
Qty: 30 TABLET | Refills: 0 | Status: SHIPPED | OUTPATIENT
Start: 2017-07-19 | End: 2017-11-02

## 2017-07-19 NOTE — PROGRESS NOTES
"General Surgery Post Op    Pt returns for follow up visit s/p robotic bilateral inguinal and open umbilical hernia on 7/6.    Patient has been doing well, tolerating diet. Bowels moving well.  No issues with wound healing/redness/drainage. No fevers.  Still some pain in right groin and in testicles. Slowly improving with time. Still using a pain pill at night to help with sleep    Physical exam: Vitals: BP (!) 155/100  Pulse 85  Ht 1.715 m (5' 7.5\")  Wt 73 kg (161 lb)  BMI 24.84 kg/m2  BMI= Body mass index is 24.84 kg/(m^2).    Exam:  Constitutional: healthy, alert and no distress  Cardiovascular: negative, PMI normal. No lifts, heaves, or thrills. RRR. No murmurs, clicks gallops or rub  Respiratory: negative, Percussion normal. Good diaphragmatic excursion. Lungs clear  Gastrointestinal: Abdomen soft, non-tender. BS normal. No masses, organomegaly  Incisions well healed. Small hard \"lump\" in direct space bilaterally from hernia sac scarring. No palpable hernia with cough.      Assessment:     ICD-10-CM    1. S/P bilateral inguinal hernia repair, follow-up exam Z09 oxyCODONE-acetaminophen (PERCOCET) 5-325 MG per tablet   2. S/P umbilical hernia repair, follow-up exam Z09      Plan: Overall doing as expected at this point- the soreness will continue to improve and the scar tissue lumps/ridges will soften with time as well. Refilled pain meds as still using and running out. As still having some pain will keep on lifting restrictions. Follow up in 2-4 weeks after he returns from his vacation to recheck.    Filipe Rodriguez MD    "

## 2017-07-19 NOTE — NURSING NOTE
"Chief Complaint   Patient presents with     Surgical Followup       Initial BP (!) 155/100  Pulse 85  Ht 5' 7.5\" (1.715 m)  Wt 161 lb (73 kg)  BMI 24.84 kg/m2 Estimated body mass index is 24.84 kg/(m^2) as calculated from the following:    Height as of this encounter: 5' 7.5\" (1.715 m).    Weight as of this encounter: 161 lb (73 kg).  Medication Reconciliation: complete   Charlene Hooks Cma      "

## 2017-07-19 NOTE — MR AVS SNAPSHOT
After Visit Summary   7/19/2017    Marcus Delacruz    MRN: [id number]           Patient Information     Date Of Birth          1970        Visit Information        Provider Department      7/19/2017 7:30 AM Filipe Rodriguez MD [facility]        Today's Diagnoses     S/P bilateral inguinal hernia repair, follow-up exam    -  1    S/P umbilical hernia repair, follow-up exam           Follow-ups after your visit        Your next 10 appointments already scheduled     Aug 02, 2017  8:00 AM CDT   Return Visit with Filipe Rodriguez MD   [facility] ([facility])    [address] [place] [id number] [phone]              Who to contact     If you have questions or need follow up information about today's clinic visit or your schedule please contact [facility] directly at [phone].  Normal or non-critical lab and imaging results will be communicated to you by MyChart, letter or phone within 4 business days after the clinic has received the results. If you do not hear from us within 7 days, please contact the clinic through [employer] or phone. If you have a critical or abnormal lab result, we will notify you by phone as soon as possible.  Submit refill requests through [email or web address] or call your pharmacy and they will forward the refill request to us. Please allow 3 business days for your refill to be completed.          Additional Information About Your Visit        MyChart Information     [email or web address] gives you secure access to your electronic health record. If you see a primary care provider, you can also send messages to your care team and make appointments. If you have questions, please call your primary care clinic.  If you do not have a primary care provider, please call [phone] and they will assist you.        Care EveryWhere ID     This is your Care EveryWhere ID. This could be used by other organizations to access  "your [place] medical records  [id number]        Your Vitals Were     Pulse Height BMI (Body Mass Index)             85 1.715 m (5' 7.5\") 24.84 kg/m2          Blood Pressure from Last 3 Encounters:   07/19/17 (!) 155/100   06/15/17 (!) 145/91   05/26/17 (!) 174/100    Weight from Last 3 Encounters:   07/19/17 73 kg (161 lb)   06/15/17 73 kg (161 lb)   05/26/17 76.8 kg (169 lb 6.4 oz)              Today, you had the following     No orders found for display         Today's Medication Changes          These changes are accurate as of: 7/19/17  7:53 AM.  If you have any questions, ask your nurse or doctor.               These medicines have changed or have updated prescriptions.        Dose/Directions    * oxyCODONE-acetaminophen 5-325 MG per tablet   Commonly known as:  PERCOCET   This may have changed:  Another medication with the same name was added. Make sure you understand how and when to take each.   Used for:  Abdominal pain, right lower quadrant   Changed by:  Rj Mejia MD        Dose:  1 tablet   Take 1 tablet by mouth every 8 hours as needed for pain   Quantity:  12 tablet   Refills:  0       * oxyCODONE-acetaminophen 5-325 MG per tablet   Commonly known as:  PERCOCET   This may have changed:  Another medication with the same name was added. Make sure you understand how and when to take each.   Used for:  Bilateral inguinal hernia without obstruction or gangrene, recurrence not specified   Changed by:  Rj Mejia MD        Dose:  1 tablet   Take 1 tablet by mouth every 4 hours as needed for pain   Quantity:  40 tablet   Refills:  0       * oxyCODONE-acetaminophen 5-325 MG per tablet   Commonly known as:  PERCOCET   This may have changed:  Another medication with the same name was added. Make sure you understand how and when to take each.   Used for:  Acute post-operative pain   Changed by:  Filipe Rodriguez MD        Dose:  1-2 tablet   Take 1-2 tablets by mouth every 4 hours as " needed for pain   Quantity:  30 tablet   Refills:  0       * oxyCODONE-acetaminophen 5-325 MG per tablet   Commonly known as:  PERCOCET   This may have changed:  You were already taking a medication with the same name, and this prescription was added. Make sure you understand how and when to take each.   Used for:  S/P bilateral inguinal hernia repair, follow-up exam   Changed by:  Filipe Rodriguez MD        Dose:  1-2 tablet   Take 1-2 tablets by mouth every 4 hours as needed for pain   Quantity:  30 tablet   Refills:  0       * Notice:  This list has 4 medication(s) that are the same as other medications prescribed for you. Read the directions carefully, and ask your doctor or other care provider to review them with you.         Where to get your medicines      Some of these will need a paper prescription and others can be bought over the counter.  Ask your nurse if you have questions.     Bring a paper prescription for each of these medications     oxyCODONE-acetaminophen 5-325 MG per tablet                Primary Care Provider Office Phone # Fax #    Rj Castro Mejia [phone]       [facility] [address] [facility] [address]        Equal Access to Services     RANULFO [facility] Hospital[facility]ISA : Hadii liu donovan hadasho Sojacquelyn, waaxda luqadaha, qaybta kaalmada adepearl, nimisha mcleod . So [facility] [phone].    ATENCIÓN: Si habla español, tiene a mcdaniel disposición servicios gratuitos de asistencia lingüística. [facility] [phone].    We comply with applicable federal civil rights laws and [place] laws. We do not discriminate on the basis of race, color, national origin, age, disability sex, sexual orientation or gender identity.            Thank you!     Thank you for choosing [facility]  for your care. Our goal is always to provide you with excellent care. Hearing back from our patients is one way we can continue to improve our services. Please  take a few minutes to complete the written survey that you may receive in the mail after your visit with us. Thank you!             Your Updated Medication List - Protect others around you: Learn how to safely use, store and throw away your medicines at www.disposemymeds.org.          This list is accurate as of: 7/19/17  7:53 AM.  Always use your most recent med list.                   Brand Name Dispense Instructions for use Diagnosis    amLODIPine 5 MG tablet    NORVASC    90 tablet    TAKE 1 TABLET DAILY FOR    BLOOD PRESSURE WITH        BREAKFAST    Essential hypertension with goal blood pressure less than 140/90       * oxyCODONE-acetaminophen 5-325 MG per tablet    PERCOCET    12 tablet    Take 1 tablet by mouth every 8 hours as needed for pain    Abdominal pain, right lower quadrant       * oxyCODONE-acetaminophen 5-325 MG per tablet    PERCOCET    40 tablet    Take 1 tablet by mouth every 4 hours as needed for pain    Bilateral inguinal hernia without obstruction or gangrene, recurrence not specified       * oxyCODONE-acetaminophen 5-325 MG per tablet    PERCOCET    30 tablet    Take 1-2 tablets by mouth every 4 hours as needed for pain    Acute post-operative pain       * oxyCODONE-acetaminophen 5-325 MG per tablet    PERCOCET    30 tablet    Take 1-2 tablets by mouth every 4 hours as needed for pain    S/P bilateral inguinal hernia repair, follow-up exam       sildenafil 20 MG tablet    REVATIO/VIAGRA    20 tablet    Take 1 tablet (20 mg) by mouth daily as needed For ed  Never use with nitroglycerin, terazosin or doxazosin. May double dosage if needed for ED    Other male erectile dysfunction       * Notice:  This list has 4 medication(s) that are the same as other medications prescribed for you. Read the directions carefully, and ask your doctor or other care provider to review them with you.

## 2017-07-27 ENCOUNTER — TELEPHONE (OUTPATIENT)
Dept: FAMILY MEDICINE | Facility: CLINIC | Age: 47
End: 2017-07-27

## 2017-07-27 NOTE — TELEPHONE ENCOUNTER
Panel Management Review      Patient has the following on his problem list:     Hypertension   Last three blood pressure readings:  BP Readings from Last 3 Encounters:   07/19/17 (!) 155/100   06/15/17 (!) 145/91   05/26/17 (!) 174/100     Blood pressure: FAILED    HTN Guidelines:  Age 18-59 BP range:  Less than 140/90  Age 60-85 with Diabetes:  Less than 140/90  Age 60-85 without Diabetes:  less than 150/90        Composite cancer screening  Chart review shows that this patient is due/due soon for the following None  Summary:    Patient is due/failing the following:   Fasting blood work and BP CHECK    Action needed:   Patient needs office visit for b/p and fasting labs/ please put in future lab orders. .    Type of outreach:    Sent videoNEXT message.    Questions for provider review:    None                                                                                                                                    Jazlyn Swanson CMA     Chart routed to Care Team .

## 2017-08-02 ENCOUNTER — OFFICE VISIT (OUTPATIENT)
Dept: SURGERY | Facility: CLINIC | Age: 47
End: 2017-08-02
Payer: COMMERCIAL

## 2017-08-02 ENCOUNTER — TELEPHONE (OUTPATIENT)
Dept: FAMILY MEDICINE | Facility: CLINIC | Age: 47
End: 2017-08-02

## 2017-08-02 VITALS
DIASTOLIC BLOOD PRESSURE: 115 MMHG | HEART RATE: 99 BPM | HEIGHT: 68 IN | WEIGHT: 171 LBS | SYSTOLIC BLOOD PRESSURE: 178 MMHG | BODY MASS INDEX: 25.91 KG/M2

## 2017-08-02 DIAGNOSIS — R10.33 UMBILICAL PAIN: ICD-10-CM

## 2017-08-02 DIAGNOSIS — I10 HYPERTENSION GOAL BP (BLOOD PRESSURE) < 140/90: Primary | ICD-10-CM

## 2017-08-02 DIAGNOSIS — G89.18 ACUTE POSTOPERATIVE PAIN OF RIGHT GROIN: Primary | ICD-10-CM

## 2017-08-02 DIAGNOSIS — N50.819 TESTICULAR PAIN: ICD-10-CM

## 2017-08-02 DIAGNOSIS — R10.31 ACUTE POSTOPERATIVE PAIN OF RIGHT GROIN: Primary | ICD-10-CM

## 2017-08-02 PROCEDURE — 99024 POSTOP FOLLOW-UP VISIT: CPT | Performed by: SURGERY

## 2017-08-02 RX ORDER — OXYCODONE AND ACETAMINOPHEN 5; 325 MG/1; MG/1
1-2 TABLET ORAL EVERY 4 HOURS PRN
Qty: 30 TABLET | Refills: 0 | Status: SHIPPED | OUTPATIENT
Start: 2017-08-02 | End: 2017-11-02

## 2017-08-02 RX ORDER — METOPROLOL SUCCINATE 50 MG/1
50 TABLET, EXTENDED RELEASE ORAL DAILY
Qty: 30 TABLET | Refills: 1 | Status: SHIPPED | OUTPATIENT
Start: 2017-08-02 | End: 2018-11-15

## 2017-08-02 NOTE — TELEPHONE ENCOUNTER
Please call patient - new prescription for metoprolol for blood pressure sent to our pharmacy. This might be temporary for blood pressure until pain better but would like to start. Limit sodium/salt in diet and double medication if not improving. Follow-up md appointment in next week to recheck blood pressure - sooner if worse. Chest pain or shortness of breath or bad headaches to ER. Rj Mejia MD

## 2017-08-02 NOTE — PROGRESS NOTES
"General Surgery Post Op    Pt returns for follow up visit s/p robotic bilateral inguinal hernia and open umbilical hernia on 7/6.    Patient has still been having pain in the right groin, also gets some at the umbilicus. Left side feels good. Has still been avoiding lifting. Gets this pain in the groin area and into the right testicle. Has not changed since last visit. Pain medicine does help. Main issues is being able to sleep. If not taking the percocet the pain is getting him awake every few hours.    Physical exam: Vitals: BP (!) 178/115  Pulse 99  Ht 1.727 m (5' 8\")  Wt 77.6 kg (171 lb)  BMI 26 kg/m2  BMI= Body mass index is 26 kg/(m^2).    Exam:  Constitutional: healthy, alert and no distress  Cardiovascular: negative, PMI normal. No lifts, heaves, or thrills. RRR. No murmurs, clicks gallops or rub  Respiratory: negative, Percussion normal. Good diaphragmatic excursion. Lungs clear  Gastrointestinal: positive findings: Incisions all well healed. Tender even to light touch at the umbilicus and in right inguinal area. No recurrent hernias palpated. Left inguinal area normal. The small \"nodule\" at area of previous hernia sac smaller on right and basically gone on the left  : normal anatomy. Bilateral tenderness of testicles, soft, no masses    Assessment:     ICD-10-CM    1. Acute postoperative pain of right groin R10.31 oxyCODONE-acetaminophen (PERCOCET) 5-325 MG per tablet    G89.18 CT Abdomen Pelvis w/o & w Contrast   2. Umbilical pain R10.33 oxyCODONE-acetaminophen (PERCOCET) 5-325 MG per tablet     CT Abdomen Pelvis w/o & w Contrast   3. Testicular pain N50.819      Plan: We are still just under a month post op so without any abnormalities found on exam I am thinking this most likely a prolonged post op/inflammatory type pain. Unsure why not affecting left side. Will check CT to make sure not missing anything. Did advise to use some ibuprofen as we need to be able to stop using he narcotics. Did " provide another refill as does get relief and able to sleep. Follow up once CT completed    Filipe Rodriguez MD

## 2017-08-02 NOTE — MR AVS SNAPSHOT
After Visit Summary   8/2/2017    Marcus Delacruz    MRN: [id number]           Patient Information     Date Of Birth          1970        Visit Information        Provider Department      8/2/2017 8:00 AM Filipe Rodriguez MD [facility]        Today's Diagnoses     Acute postoperative pain of right groin    -  1    Umbilical pain        Testicular pain           Follow-ups after your visit        Future tests that were ordered for you today     Open Future Orders        Priority Expected Expires Ordered    CT Abdomen Pelvis w/o & w Contrast Routine  8/2/2018 8/2/2017            Who to contact     If you have questions or need follow up information about today's clinic visit or your schedule please contact [facility] directly at [phone].  Normal or non-critical lab and imaging results will be communicated to you by [employer], letter or phone within 4 business days after the clinic has received the results. If you do not hear from us within 7 days, please contact the clinic through [employer] or phone. If you have a critical or abnormal lab result, we will notify you by phone as soon as possible.  Submit refill requests through [employer] or call your pharmacy and they will forward the refill request to us. Please allow 3 business days for your refill to be completed.          Additional Information About Your Visit        MyChart Information     [employer] gives you secure access to your electronic health record. If you see a primary care provider, you can also send messages to your care team and make appointments. If you have questions, please call your primary care clinic.  If you do not have a primary care provider, please call [phone] and they will assist you.        Care EveryWhere ID     This is your Care EveryWhere ID. This could be used by other organizations to access your [place] medical records  [id number]        Your Vitals Were     Pulse Height BMI  "(Body Mass Index)             99 1.727 m (5' 8\") 26 kg/m2          Blood Pressure from Last 3 Encounters:   08/02/17 (!) 178/115   07/19/17 (!) 155/100   06/15/17 (!) 145/91    Weight from Last 3 Encounters:   08/02/17 77.6 kg (171 lb)   07/19/17 73 kg (161 lb)   06/15/17 73 kg (161 lb)                 Today's Medication Changes          These changes are accurate as of: 8/2/17  8:37 AM.  If you have any questions, ask your nurse or doctor.               These medicines have changed or have updated prescriptions.        Dose/Directions    * oxyCODONE-acetaminophen 5-325 MG per tablet   Commonly known as:  PERCOCET   This may have changed:  Another medication with the same name was added. Make sure you understand how and when to take each.   Used for:  Abdominal pain, right lower quadrant   Changed by:  Rj Mejia MD        Dose:  1 tablet   Take 1 tablet by mouth every 8 hours as needed for pain   Quantity:  12 tablet   Refills:  0       * oxyCODONE-acetaminophen 5-325 MG per tablet   Commonly known as:  PERCOCET   This may have changed:  Another medication with the same name was added. Make sure you understand how and when to take each.   Used for:  Bilateral inguinal hernia without obstruction or gangrene, recurrence not specified   Changed by:  Rj Mejia MD        Dose:  1 tablet   Take 1 tablet by mouth every 4 hours as needed for pain   Quantity:  40 tablet   Refills:  0       * oxyCODONE-acetaminophen 5-325 MG per tablet   Commonly known as:  PERCOCET   This may have changed:  Another medication with the same name was added. Make sure you understand how and when to take each.   Used for:  Acute post-operative pain   Changed by:  Filipe Rodriguez MD        Dose:  1-2 tablet   Take 1-2 tablets by mouth every 4 hours as needed for pain   Quantity:  30 tablet   Refills:  0       * oxyCODONE-acetaminophen 5-325 MG per tablet   Commonly known as:  PERCOCET   This may have changed:  Another " medication with the same name was added. Make sure you understand how and when to take each.   Used for:  S/P bilateral inguinal hernia repair, follow-up exam   Changed by:  Filipe Rodriguez MD        Dose:  1-2 tablet   Take 1-2 tablets by mouth every 4 hours as needed for pain   Quantity:  30 tablet   Refills:  0       * oxyCODONE-acetaminophen 5-325 MG per tablet   Commonly known as:  PERCOCET   This may have changed:  You were already taking a medication with the same name, and this prescription was added. Make sure you understand how and when to take each.   Used for:  Acute postoperative pain of right groin, Umbilical pain   Changed by:  Filipe Rodriguez MD        Dose:  1-2 tablet   Take 1-2 tablets by mouth every 4 hours as needed for pain   Quantity:  30 tablet   Refills:  0       * Notice:  This list has 5 medication(s) that are the same as other medications prescribed for you. Read the directions carefully, and ask your doctor or other care provider to review them with you.         Where to get your medicines      Some of these will need a paper prescription and others can be bought over the counter.  Ask your nurse if you have questions.     Bring a paper prescription for each of these medications     oxyCODONE-acetaminophen 5-325 MG per tablet                Primary Care Provider Office Phone # Fax #    Rj Mejia [phone]       [facility] [address] [facility] [address]        Equal Access to Services     KIRSTIE CRUMP : Hadjosi antono Sojacquelyn, waaxda luqadaha, qaybta kaalmada [place], nimisha castellanos. So [facility] [phone].    ATENCIÓN: Si habla español, tiene a mcdaniel disposición servicios gratuitos de asistencia lingüística. Jazmine al [phone].    We comply with applicable federal civil rights laws and [place] laws. We do not discriminate on the basis of race, color, national origin, age, disability sex,  sexual orientation or gender identity.            Thank you!     Thank you for choosing [facility]  for your care. Our goal is always to provide you with excellent care. Hearing back from our patients is one way we can continue to improve our services. Please take a few minutes to complete the written survey that you may receive in the mail after your visit with us. Thank you!             Your Updated Medication List - Protect others around you: Learn how to safely use, store and throw away your medicines at www.disposemymeds.org.          This list is accurate as of: 8/2/17  8:37 AM.  Always use your most recent med list.                   Brand Name Dispense Instructions for use Diagnosis    amLODIPine 5 MG tablet    NORVASC    90 tablet    TAKE 1 TABLET DAILY FOR    BLOOD PRESSURE WITH        BREAKFAST    Essential hypertension with goal blood pressure less than 140/90       * oxyCODONE-acetaminophen 5-325 MG per tablet    PERCOCET    12 tablet    Take 1 tablet by mouth every 8 hours as needed for pain    Abdominal pain, right lower quadrant       * oxyCODONE-acetaminophen 5-325 MG per tablet    PERCOCET    40 tablet    Take 1 tablet by mouth every 4 hours as needed for pain    Bilateral inguinal hernia without obstruction or gangrene, recurrence not specified       * oxyCODONE-acetaminophen 5-325 MG per tablet    PERCOCET    30 tablet    Take 1-2 tablets by mouth every 4 hours as needed for pain    Acute post-operative pain       * oxyCODONE-acetaminophen 5-325 MG per tablet    PERCOCET    30 tablet    Take 1-2 tablets by mouth every 4 hours as needed for pain    S/P bilateral inguinal hernia repair, follow-up exam       * oxyCODONE-acetaminophen 5-325 MG per tablet    PERCOCET    30 tablet    Take 1-2 tablets by mouth every 4 hours as needed for pain    Acute postoperative pain of right groin, Umbilical pain       sildenafil 20 MG tablet    REVATIO/VIAGRA    20 tablet    Take 1 tablet (20 mg) by  mouth daily as needed For ed  Never use with nitroglycerin, terazosin or doxazosin. May double dosage if needed for ED    Other male erectile dysfunction       * Notice:  This list has 5 medication(s) that are the same as other medications prescribed for you. Read the directions carefully, and ask your doctor or other care provider to review them with you.

## 2017-08-02 NOTE — TELEPHONE ENCOUNTER
Patient came in for his post op and his BP was 178/115 he denies any dizziness or Chest pain he is in pain still from his surgery that was one month ago. Please call patient and advise. Charlene Hooks Cma

## 2017-08-02 NOTE — NURSING NOTE
"Chief Complaint   Patient presents with     Surgical Followup       Initial BP (!) 178/115  Pulse 99  Ht 5' 8\" (1.727 m)  Wt 171 lb (77.6 kg)  BMI 26 kg/m2 Estimated body mass index is 26 kg/(m^2) as calculated from the following:    Height as of this encounter: 5' 8\" (1.727 m).    Weight as of this encounter: 171 lb (77.6 kg).  Medication Reconciliation: sammi Hooks Cma      "

## 2017-08-02 NOTE — TELEPHONE ENCOUNTER
Pt returned call and left message on triage voice mail, attempted to call pt again and left message for pt to return my call.  Jazlyn Borrego RN

## 2017-08-02 NOTE — TELEPHONE ENCOUNTER
Pt notified of provider message as written.  Pt verbalized good understanding.  Appointment made for next week.  Jazlyn Borrego RN

## 2017-08-08 ENCOUNTER — RADIANT APPOINTMENT (OUTPATIENT)
Dept: CT IMAGING | Facility: CLINIC | Age: 47
End: 2017-08-08
Attending: SURGERY
Payer: COMMERCIAL

## 2017-08-08 DIAGNOSIS — G89.18 ACUTE POSTOPERATIVE PAIN OF RIGHT GROIN: ICD-10-CM

## 2017-08-08 DIAGNOSIS — R10.33 UMBILICAL PAIN: ICD-10-CM

## 2017-08-08 DIAGNOSIS — R10.31 ACUTE POSTOPERATIVE PAIN OF RIGHT GROIN: ICD-10-CM

## 2017-08-08 PROCEDURE — 74177 CT ABD & PELVIS W/CONTRAST: CPT | Mod: TC

## 2017-08-08 RX ORDER — IOPAMIDOL 755 MG/ML
84 INJECTION, SOLUTION INTRAVASCULAR ONCE
Status: COMPLETED | OUTPATIENT
Start: 2017-08-08 | End: 2017-08-08

## 2017-08-08 RX ADMIN — IOPAMIDOL 84 ML: 755 INJECTION, SOLUTION INTRAVASCULAR at 14:27

## 2017-08-14 ENCOUNTER — OFFICE VISIT (OUTPATIENT)
Dept: SURGERY | Facility: CLINIC | Age: 47
End: 2017-08-14
Payer: COMMERCIAL

## 2017-08-14 VITALS
WEIGHT: 173 LBS | BODY MASS INDEX: 26.22 KG/M2 | HEIGHT: 68 IN | SYSTOLIC BLOOD PRESSURE: 148 MMHG | DIASTOLIC BLOOD PRESSURE: 88 MMHG | HEART RATE: 73 BPM

## 2017-08-14 DIAGNOSIS — Z09 S/P BILATERAL INGUINAL HERNIA REPAIR, FOLLOW-UP EXAM: Primary | ICD-10-CM

## 2017-08-14 DIAGNOSIS — Z87.19 S/P BILATERAL INGUINAL HERNIA REPAIR, FOLLOW-UP EXAM: Primary | ICD-10-CM

## 2017-08-14 DIAGNOSIS — Z09 S/P UMBILICAL HERNIA REPAIR, FOLLOW-UP EXAM: ICD-10-CM

## 2017-08-14 PROCEDURE — 99024 POSTOP FOLLOW-UP VISIT: CPT | Performed by: SURGERY

## 2017-08-14 NOTE — PROGRESS NOTES
"General Surgery Post Op    Pt returns for follow up visit s/p robotic bilateral inguinal hernia and umbilical hernia    Patient has been doing well, feeling much better. Still some soreness at times but not like it was. Starting to do a little more with lifting/activity as time goes on    Physical exam: Vitals: /88  Pulse 73  Ht 1.715 m (5' 7.5\")  Wt 78.5 kg (173 lb)  BMI 26.7 kg/m2  BMI= Body mass index is 26.7 kg/(m^2).    Exam:  Constitutional: healthy, alert and no distress  Gastrointestinal: Abdomen soft, mild tenderness at umbilicus no recurrent hernia. BS normal. No masses, organomegaly  Incisions well healed  : normal anatomy. No palpable inguinal hernias. Previously noted \"nodules\" almost gone on right and unable to feel on left. No tenderness in groins.    Imaging: personally reviewed with patient  CT ABDOMEN AND PELVIS WITH CONTRAST   8/8/2017 2:34 PM      HISTORY: Continued inguinal pain and umbilical pain status post hernia  repairs. Right lower quadrant pain. Other acute postprocedural pain.  Periumbilical pain.     TECHNIQUE: 84 mL Isovue-370. Radiation dose for this scan was reduced  using automated exposure control, adjustment of the mA and/or kV  according to patient size, or iterative reconstruction technique.     COMPARISON: None.     FINDINGS: Calcified granuloma noted at the right lung base. Diffuse  low-density of the liver without biliary dilatation or focal mass. The  gallbladder, spleen, pancreas, and adrenal glands are unremarkable.  Left kidney congenitally absent. There is a nonobstructing calculus at  the lower pole of the right kidney that measures 4 mm. No solid right  renal mass. There is a calcification in the periphery of the posterior  right kidney, benign. No abdominal or retroperitoneal adenopathy.     In the region of the right inguinal canal, there is a 18 mm  low-density structure (series 2, image 77). No evidence of hernia  recurrence. No pelvic " adenopathy.         IMPRESSION:  1. Low-density structure in the right inguinal canal may represent a  small postoperative seroma or hematoma. Abscess cannot be completely  excluded, although there is no significant surrounding infiltration.  2. Fatty infiltration of the liver.  3. Congenital absence of the left kidney.  4. 4 mm nonobstructing calculus in the lower pole of the right kidney.    Assessment:     ICD-10-CM    1. S/P bilateral inguinal hernia repair, follow-up exam Z09    2. S/P umbilical hernia repair, follow-up exam Z09      Plan: Pain much improved. Nodules appeared as seroma or hematoma in area of hernia sac as I had suspected and resolving with time. Pt OK to liberalize activity and lifting as tolerated. Follow up as needed.    Filipe Rodriguez MD

## 2017-08-14 NOTE — NURSING NOTE
"Chief Complaint   Patient presents with     Results     CT       Initial /88  Pulse 73  Ht 5' 7.5\" (1.715 m)  Wt 173 lb (78.5 kg)  BMI 26.7 kg/m2 Estimated body mass index is 26.7 kg/(m^2) as calculated from the following:    Height as of this encounter: 5' 7.5\" (1.715 m).    Weight as of this encounter: 173 lb (78.5 kg).  Medication Reconciliation: complete   Charlene Hooks Cma      "

## 2017-08-14 NOTE — MR AVS SNAPSHOT
"              After Visit Summary   8/14/2017    Marcus Delacruz    MRN: [id number]           Patient Information     Date Of Birth          1970        Visit Information        Provider Department      8/14/2017 9:15 AM Filipe Rodriguez MD [facility]        Today's Diagnoses     S/P bilateral inguinal hernia repair, follow-up exam    -  1    S/P umbilical hernia repair, follow-up exam           Follow-ups after your visit        Who to contact     If you have questions or need follow up information about today's clinic visit or your schedule please contact [facility] directly at [phone].  Normal or non-critical lab and imaging results will be communicated to you by LIAhart, letter or phone within 4 business days after the clinic has received the results. If you do not hear from us within 7 days, please contact the clinic through LIAhart or phone. If you have a critical or abnormal lab result, we will notify you by phone as soon as possible.  Submit refill requests through Optrace or call your pharmacy and they will forward the refill request to us. Please allow 3 business days for your refill to be completed.          Additional Information About Your Visit        MyChart Information     Optrace gives you secure access to your electronic health record. If you see a primary care provider, you can also send messages to your care team and make appointments. If you have questions, please call your primary care clinic.  If you do not have a primary care provider, please call [phone] and they will assist you.        Care EveryWhere ID     This is your Care EveryWhere ID. This could be used by other organizations to access your [place] medical records  [id number]        Your Vitals Were     Pulse Height BMI (Body Mass Index)             73 1.715 m (5' 7.5\") 26.7 kg/m2          Blood Pressure from Last 3 Encounters:   08/14/17 148/88   08/02/17 (!) 178/115   07/19/17 " (!) 155/100    Weight from Last 3 Encounters:   08/14/17 78.5 kg (173 lb)   08/02/17 77.6 kg (171 lb)   07/19/17 73 kg (161 lb)              Today, you had the following     No orders found for display       Primary Care Provider Office Phone # Fax #    Rj Castro Mejia [phone] [address] [facility] [address]        Equal Access to Services     RANULFO [facility]ISA : Hadii aad ku hadasho Soomaali, waaxda luqadaha, qaybta kaalmada adeegyada, waxay idiin hayaan adeeg kharash la'lana . So [facility] [phone].    ATENCIÓN: Si habla español, tiene a mcdaniel disposición servicios gratuitos de asistencia lingüística. LlAult[facility] [phone].    We comply with applicable federal civil rights laws and [place] laws. We do not discriminate on the basis of race, color, national origin, age, disability sex, sexual orientation or gender identity.            Thank you!     Thank you for choosing [facility]  for your care. Our goal is always to provide you with excellent care. Hearing back from our patients is one way we can continue to improve our services. Please take a few minutes to complete the written survey that you may receive in the mail after your visit with us. Thank you!             Your Updated Medication List - Protect others around you: Learn how to safely use, store and throw away your medicines at www.disposemymeds.org.          This list is accurate as of: 8/14/17  9:36 AM.  Always use your most recent med list.                   Brand Name Dispense Instructions for use Diagnosis    amLODIPine 5 MG tablet    NORVASC    90 tablet    TAKE 1 TABLET DAILY FOR    BLOOD PRESSURE WITH        BREAKFAST    Essential hypertension with goal blood pressure less than 140/90       metoprolol 50 MG 24 hr tablet    TOPROL-XL    30 tablet    Take 1 tablet (50 mg) by mouth daily New for blood pressure take at bedtime    Hypertension goal BP (blood pressure) < 140/90       * oxyCODONE-acetaminophen  5-325 MG per tablet    PERCOCET    12 tablet    Take 1 tablet by mouth every 8 hours as needed for pain    Abdominal pain, right lower quadrant       * oxyCODONE-acetaminophen 5-325 MG per tablet    PERCOCET    40 tablet    Take 1 tablet by mouth every 4 hours as needed for pain    Bilateral inguinal hernia without obstruction or gangrene, recurrence not specified       * oxyCODONE-acetaminophen 5-325 MG per tablet    PERCOCET    30 tablet    Take 1-2 tablets by mouth every 4 hours as needed for pain    Acute post-operative pain       * oxyCODONE-acetaminophen 5-325 MG per tablet    PERCOCET    30 tablet    Take 1-2 tablets by mouth every 4 hours as needed for pain    S/P bilateral inguinal hernia repair, follow-up exam       * oxyCODONE-acetaminophen 5-325 MG per tablet    PERCOCET    30 tablet    Take 1-2 tablets by mouth every 4 hours as needed for pain    Acute postoperative pain of right groin, Umbilical pain       sildenafil 20 MG tablet    REVATIO/VIAGRA    20 tablet    Take 1 tablet (20 mg) by mouth daily as needed For ed  Never use with nitroglycerin, terazosin or doxazosin. May double dosage if needed for ED    Other male erectile dysfunction       * Notice:  This list has 5 medication(s) that are the same as other medications prescribed for you. Read the directions carefully, and ask your doctor or other care provider to review them with you.

## 2017-10-31 ENCOUNTER — DOCUMENTATION ONLY (OUTPATIENT)
Dept: LAB | Facility: CLINIC | Age: 47
End: 2017-10-31

## 2017-10-31 DIAGNOSIS — R73.9 HYPERGLYCEMIA: Primary | ICD-10-CM

## 2017-10-31 DIAGNOSIS — Z12.5 SCREENING FOR PROSTATE CANCER: ICD-10-CM

## 2017-10-31 DIAGNOSIS — Z13.6 CARDIOVASCULAR SCREENING; LDL GOAL LESS THAN 160: ICD-10-CM

## 2017-10-31 DIAGNOSIS — I10 HYPERTENSION GOAL BP (BLOOD PRESSURE) < 140/90: ICD-10-CM

## 2017-10-31 DIAGNOSIS — R03.0 ELEVATED BLOOD PRESSURE READING WITHOUT DIAGNOSIS OF HYPERTENSION: ICD-10-CM

## 2017-10-31 NOTE — PROGRESS NOTES
SUBJECTIVE:   CC: Marcus Delacruz is an 47 year old male who presents for preventative health visit.     Healthy Habits:    Answers for HPI/ROS submitted by the patient on 11/13/2017   Annual Exam:  Getting at least 3 servings of Calcium per day:: Yes  Bi-annual eye exam:: Yes  Dental care twice a year:: Yes  Sleep apnea or symptoms of sleep apnea:: None  Diet:: Regular (no restrictions)  Frequency of exercise:: 6-7 days/week  Taking medications regularly:: Yes  Medication side effects:: Not applicable  Additional concerns today:: No  PHQ-2 Score: 0  Duration of exercise:: 45-60 minutes      Needs form for work filled out. Already had labs.  Borderline glucose again, we will check a1c at next lab appointment, he states understanding.   Cholesterol was okay.   No skin concerns-he sees derm once a year because he tans so much per patient.   Had hernia surgery this year.   No concerns today.   PSA normal. Kidney function normal.   Blood pressure to goal-does not need medications refilled today per patient.               Today's PHQ-2 Score: PHQ-2 ( 1999 Pfizer) 11/13/2017 11/11/2016   Q1: Little interest or pleasure in doing things 0 0   Q2: Feeling down, depressed or hopeless 0 0   PHQ-2 Score 0 0   Q1: Little interest or pleasure in doing things Not at all -   Q2: Feeling down, depressed or hopeless Not at all -   PHQ-2 Score 0 -         Abuse: Current or Past(Physical, Sexual or Emotional)- No  Do you feel safe in your environment - Yes  Social History   Substance Use Topics     Smoking status: Never Smoker     Smokeless tobacco: Former User      Comment: NON SMOKING HOME     Alcohol use Yes     The patient does not drink >3 drinks per day nor >7 drinks per week.    Last PSA:   PSA   Date Value Ref Range Status   11/06/2017 0.96 0 - 4 ug/L Final     Comment:     Assay Method:  Chemiluminescence using Siemens Vista analyzer       Reviewed orders with patient. Reviewed health maintenance and updated orders  accordingly - Yes  Labs reviewed in EPIC  BP Readings from Last 3 Encounters:   11/13/17 118/72   08/14/17 148/88   08/02/17 (!) 178/115    Wt Readings from Last 3 Encounters:   11/13/17 172 lb (78 kg)   08/14/17 173 lb (78.5 kg)   08/02/17 171 lb (77.6 kg)                  Patient Active Problem List   Diagnosis     CARDIOVASCULAR SCREENING; LDL GOAL LESS THAN 160     Kidney congenitally absent, left     Kidney stones     Pain in joint, lower leg     Elevated blood pressure reading without diagnosis of hypertension     Hypertension goal BP (blood pressure) < 140/90     Microhematuria     Rotator cuff tear     Special screening for malignant neoplasm of prostate     Other male erectile dysfunction     Hyperglycemia     Past Surgical History:   Procedure Laterality Date     EXTRACORPOREAL SHOCK WAVE LITHOTRIPSY (ESWL)  .3.4.2016     GENITOURINARY SURGERY  Kidney Stones    21 Lithotripsy procedures     ORTHOPEDIC SURGERY  2015    right shoulder rotor cuff     TONSILLECTOMY  2008       Social History   Substance Use Topics     Smoking status: Never Smoker     Smokeless tobacco: Former User      Comment: NON SMOKING HOME     Alcohol use Yes     Family History   Problem Relation Age of Onset     CANCER Father 68     pancreatic cancer     DIABETES Father      Other Cancer Father                Reviewed and updated as needed this visit by clinical staffTobacco  Allergies  Meds  Med Hx  Surg Hx  Fam Hx  Soc Hx        Reviewed and updated as needed this visit by Provider        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     Kidney stones 9/16/2011     NO ACTIVE PROBLEMS       Past Surgical History:   Procedure Laterality Date     EXTRACORPOREAL SHOCK WAVE LITHOTRIPSY (ESWL)  .3.4.2016     GENITOURINARY SURGERY  Kidney Stones    21 Lithotripsy procedures     ORTHOPEDIC SURGERY  2015    right shoulder rotor cuff      TONSILLECTOMY  2008       ROS:  C: NEGATIVE for fever, chills, change in weight  I: NEGATIVE for worrisome rashes, moles or lesions  E: NEGATIVE for vision changes or irritation  ENT: NEGATIVE for ear, mouth and throat problems  R: NEGATIVE for significant cough or SOB  CV: NEGATIVE for chest pain, palpitations or peripheral edema  GI: NEGATIVE for nausea, abdominal pain, heartburn, or change in bowel habits   male: negative for dysuria, hematuria, decreased urinary stream, erectile dysfunction, urethral discharge  M: NEGATIVE for significant arthralgias or myalgia  N: NEGATIVE for weakness, dizziness or paresthesias  P: NEGATIVE for changes in mood or affect    OBJECTIVE:   BP (!) 143/92  Pulse 66  Temp 97.8  F (36.6  C) (Oral)  Wt 172 lb (78 kg)  SpO2 98%  BMI 26.54 kg/m2  EXAM:  GENERAL: healthy, alert and no distress  EYES: Eyes grossly normal to inspection, PERRL and conjunctivae and sclerae normal  HENT: ear canals and TM's normal, nose and mouth without ulcers or lesions  NECK: no adenopathy, no asymmetry, masses, or scars and thyroid normal to palpation  RESP: lungs clear to auscultation - no rales, rhonchi or wheezes  CV: regular rate and rhythm, normal S1 S2, no S3 or S4, no murmur, click or rub, no peripheral edema and peripheral pulses strong  ABDOMEN: soft, nontender, no hepatosplenomegaly, no masses and bowel sounds normal  MS: no gross musculoskeletal defects noted, no edema  SKIN: no suspicious lesions or rashes  NEURO: Normal strength and tone, mentation intact and speech normal  PSYCH: mentation appears normal, affect normal/bright    ASSESSMENT/PLAN:   ASSESSMENT / PLAN:  (Z00.00) Encounter for routine adult health examination without abnormal findings  (primary encounter diagnosis)  Comment:   Plan:       COUNSELING:  Reviewed preventive health counseling, as reflected in patient instructions       Regular exercise       Healthy diet/nutrition       Vision screening           reports that  "he has never smoked. He has quit using smokeless tobacco.      Estimated body mass index is 26.54 kg/(m^2) as calculated from the following:    Height as of 8/14/17: 5' 7.5\" (1.715 m).    Weight as of this encounter: 172 lb (78 kg).     Patient Instructions     Preventive Health Recommendations  Male Ages 40 to 49    Yearly exam:             See your health care provider every year in order to  o   Review health changes.   o   Discuss preventive care.    o   Review your medicines if your doctor has prescribed any.    You should be tested each year for STDs (sexually transmitted diseases) if you re at risk.     Have a cholesterol test every 5 years.     Have a colonoscopy (test for colon cancer) if someone in your family has had colon cancer or polyps before age 50.     After age 45, have a diabetes test (fasting glucose). If you are at risk for diabetes, you should have this test every 3 years.      Talk with your health care provider about whether or not a prostate cancer screening test (PSA) is right for you.    Shots: Get a flu shot each year. Get a tetanus shot every 10 years.     Nutrition:    Eat at least 5 servings of fruits and vegetables daily.     Eat whole-grain bread, whole-wheat pasta and brown rice instead of white grains and rice.     Talk to your provider about Calcium and Vitamin D.     Lifestyle    Exercise for at least 150 minutes a week (30 minutes a day, 5 days a week). This will help you control your weight and prevent disease.     Limit alcohol to one drink per day.     No smoking.     Wear sunscreen to prevent skin cancer.     See your dentist every six months for an exam and cleaning.        Counseling Resources:  ATP IV Guidelines  Pooled Cohorts Equation Calculator  FRAX Risk Assessment  ICSI Preventive Guidelines  Dietary Guidelines for Americans, 2010  USDA's MyPlate  ASA Prophylaxis  Lung CA Screening    Marya Maki PA-C  [facility]  "

## 2017-11-01 NOTE — PROGRESS NOTES
This patient has a lab only appointment on 11/6/2017 but does not have future orders. Please review, associate diagnosis and sign pending lab orders for the upcoming appointment.  He has an appointment with Marya Maki on 11/13/2017.    Thank you,    [facility]

## 2017-11-01 NOTE — PROGRESS NOTES
Please review lab orders sign and close encounter. Samreen Garner MA/DIOMEDES    Physical 11/13/17

## 2017-11-02 ENCOUNTER — TELEPHONE (OUTPATIENT)
Dept: FAMILY MEDICINE | Facility: CLINIC | Age: 47
End: 2017-11-02

## 2017-11-02 ENCOUNTER — TRANSFERRED RECORDS (OUTPATIENT)
Dept: HEALTH INFORMATION MANAGEMENT | Facility: CLINIC | Age: 47
End: 2017-11-02

## 2017-11-02 NOTE — TELEPHONE ENCOUNTER
Panel Management Review      Patient has the following on his problem list:     Hypertension   Last three blood pressure readings:  BP Readings from Last 3 Encounters:   08/14/17 148/88   08/02/17 (!) 178/115   07/19/17 (!) 155/100     Blood pressure: FAILED    HTN Guidelines:  Age 18-59 BP range:  Less than 140/90  Age 60-85 with Diabetes:  Less than 140/90  Age 60-85 without Diabetes:  less than 150/90        Composite cancer screening  Chart review shows that this patient is due/due soon for the following None  Summary:    Patient is due/failing the following:   BP CHECK    Action needed:   He has an appt with Marya Maki on 11-    Type of outreach:    0    Questions for provider review:    None                                                                                                                                    Jazlyn Swanson CMA     Chart routed to 0 .

## 2017-11-06 DIAGNOSIS — R73.9 HYPERGLYCEMIA: ICD-10-CM

## 2017-11-06 DIAGNOSIS — Z13.6 CARDIOVASCULAR SCREENING; LDL GOAL LESS THAN 160: ICD-10-CM

## 2017-11-06 DIAGNOSIS — I10 HYPERTENSION GOAL BP (BLOOD PRESSURE) < 140/90: ICD-10-CM

## 2017-11-06 DIAGNOSIS — Z12.5 SCREENING FOR PROSTATE CANCER: ICD-10-CM

## 2017-11-06 LAB
ANION GAP SERPL CALCULATED.3IONS-SCNC: 8 MMOL/L (ref 3–14)
BUN SERPL-MCNC: 23 MG/DL (ref 7–30)
CALCIUM SERPL-MCNC: 9.7 MG/DL (ref 8.5–10.1)
CHLORIDE SERPL-SCNC: 108 MMOL/L (ref 94–109)
CHOLEST SERPL-MCNC: 192 MG/DL
CO2 SERPL-SCNC: 26 MMOL/L (ref 20–32)
CREAT SERPL-MCNC: 0.99 MG/DL (ref 0.66–1.25)
GFR SERPL CREATININE-BSD FRML MDRD: 80 ML/MIN/1.7M2
GLUCOSE SERPL-MCNC: 124 MG/DL (ref 70–99)
HDLC SERPL-MCNC: 57 MG/DL
LDLC SERPL CALC-MCNC: 108 MG/DL
NONHDLC SERPL-MCNC: 135 MG/DL
POTASSIUM SERPL-SCNC: 4.3 MMOL/L (ref 3.4–5.3)
PSA SERPL-ACNC: 0.96 UG/L (ref 0–4)
SODIUM SERPL-SCNC: 142 MMOL/L (ref 133–144)
TRIGL SERPL-MCNC: 137 MG/DL

## 2017-11-06 PROCEDURE — 80048 BASIC METABOLIC PNL TOTAL CA: CPT | Performed by: PHYSICIAN ASSISTANT

## 2017-11-06 PROCEDURE — G0103 PSA SCREENING: HCPCS | Performed by: PHYSICIAN ASSISTANT

## 2017-11-06 PROCEDURE — 36415 COLL VENOUS BLD VENIPUNCTURE: CPT | Performed by: PHYSICIAN ASSISTANT

## 2017-11-06 PROCEDURE — 80061 LIPID PANEL: CPT | Performed by: PHYSICIAN ASSISTANT

## 2017-11-06 NOTE — PROGRESS NOTES
Magalys Velazquez,       Your recent test results are attached, if you have any questions or concerns please feel free to contact me via e-mail or call [phone]. We will discuss at your upcoming visit.  Prostate screening negative/normal. Cholesterol has improved great job. Blood sugar is still elevated but about the same as last year. We should recheck that in 6 months with a1c test also.  Kidney function is normal.       Sincerely,  Marya Maki PA-C

## 2017-11-07 ENCOUNTER — TRANSFERRED RECORDS (OUTPATIENT)
Dept: HEALTH INFORMATION MANAGEMENT | Facility: CLINIC | Age: 47
End: 2017-11-07

## 2017-11-13 ENCOUNTER — OFFICE VISIT (OUTPATIENT)
Dept: FAMILY MEDICINE | Facility: CLINIC | Age: 47
End: 2017-11-13
Payer: COMMERCIAL

## 2017-11-13 VITALS
OXYGEN SATURATION: 98 % | HEART RATE: 66 BPM | SYSTOLIC BLOOD PRESSURE: 118 MMHG | WEIGHT: 172 LBS | TEMPERATURE: 97.8 F | DIASTOLIC BLOOD PRESSURE: 72 MMHG | BODY MASS INDEX: 26.54 KG/M2

## 2017-11-13 DIAGNOSIS — Z00.00 ENCOUNTER FOR ROUTINE ADULT HEALTH EXAMINATION WITHOUT ABNORMAL FINDINGS: Primary | ICD-10-CM

## 2017-11-13 PROCEDURE — 99396 PREV VISIT EST AGE 40-64: CPT | Performed by: PHYSICIAN ASSISTANT

## 2017-11-13 NOTE — MR AVS SNAPSHOT
After Visit Summary   11/13/2017    Marcus Delacruz    MRN: [id number]           Patient Information     Date Of Birth          1970        Visit Information        Provider Department      11/13/2017 1:00 PM Marya Maki PA-C [facility]        Care Instructions      Preventive Health Recommendations  Male Ages 40 to 49    Yearly exam:             See your health care provider every year in order to  o   Review health changes.   o   Discuss preventive care.    o   Review your medicines if your doctor has prescribed any.    You should be tested each year for STDs (sexually transmitted diseases) if you re at risk.     Have a cholesterol test every 5 years.     Have a colonoscopy (test for colon cancer) if someone in your family has had colon cancer or polyps before age 50.     After age 45, have a diabetes test (fasting glucose). If you are at risk for diabetes, you should have this test every 3 years.      Talk with your health care provider about whether or not a prostate cancer screening test (PSA) is right for you.    Shots: Get a flu shot each year. Get a tetanus shot every 10 years.     Nutrition:    Eat at least 5 servings of fruits and vegetables daily.     Eat whole-grain bread, whole-wheat pasta and brown rice instead of white grains and rice.     Talk to your provider about Calcium and Vitamin D.     Lifestyle    Exercise for at least 150 minutes a week (30 minutes a day, 5 days a week). This will help you control your weight and prevent disease.     Limit alcohol to one drink per day.     No smoking.     Wear sunscreen to prevent skin cancer.     See your dentist every six months for an exam and cleaning.              Follow-ups after your visit        Who to contact     If you have questions or need follow up information about today's clinic visit or your schedule please contact [facility] directly at [phone].  Normal or non-critical lab  and imaging results will be communicated to you by MyChart, letter or phone within 4 business days after the clinic has received the results. If you do not hear from us within 7 days, please contact the clinic through Kayo technologyt or phone. If you have a critical or abnormal lab result, we will notify you by phone as soon as possible.  Submit refill requests through Your Image by Brooke or call your pharmacy and they will forward the refill request to us. Please allow 3 business days for your refill to be completed.          Additional Information About Your Visit        [employer] Information     Your Image by Brooke gives you secure access to your electronic health record. If you see a primary care provider, you can also send messages to your care team and make appointments. If you have questions, please call your primary care clinic.  If you do not have a primary care provider, please call [phone] and they will assist you.        Care EveryWhere ID     This is your Care EveryWhere ID. This could be used by other organizations to access your [place] medical records  [id number]        Your Vitals Were     Pulse Temperature Pulse Oximetry BMI (Body Mass Index)          66 97.8  F (36.6  C) (Oral) 98% 26.54 kg/m2         Blood Pressure from Last 3 Encounters:   11/13/17 118/72   08/14/17 148/88   08/02/17 (!) 178/115    Weight from Last 3 Encounters:   11/13/17 172 lb (78 kg)   08/14/17 173 lb (78.5 kg)   08/02/17 171 lb (77.6 kg)              Today, you had the following     No orders found for display       Primary Care Provider Office Phone # Fax #    Rj Castro Mejia [phone] [address] [facility] [address]        Equal Access to Services     RANULFO [facility]ISA : Hadii liu García, alexander curtis, nimisha hunter. So [facility] [phone].    ATENCIÓN: Si habla español, tiene a mcdaniel disposición servicios gratuitos de asistencia lingüística. Llame al  [phone].    We comply with applicable federal civil rights laws and [place] laws. We do not discriminate on the basis of race, color, national origin, age, disability, sex, sexual orientation, or gender identity.            Thank you!     Thank you for choosing [facility]  [facility] your [facility]. Our goal is always to provide you with excellent care. Hearing back from our patients is one way we can continue to improve our services. Please take a few minutes to complete the written survey that you may receive in the mail after your visit with us. Thank you!             Your Updated Medication List - Protect others around you: Learn how to safely use, store and throw away your medicines at www.disposemymeds.org.          This list is accurate as of: 11/13/17  1:40 PM.  Always use your most recent med list.                   Brand Name Dispense Instructions for use Diagnosis    metoprolol 50 MG 24 hr tablet    TOPROL-XL    30 tablet    Take 1 tablet (50 mg) by mouth daily New for blood pressure take at bedtime    Hypertension goal BP (blood pressure) < 140/90       sildenafil 20 MG tablet    REVATIO    20 tablet    Take 1 tablet (20 mg) by mouth daily as needed For ed  Never use with nitroglycerin, terazosin or doxazosin. May double dosage if needed for ED    Other male erectile dysfunction

## 2018-07-19 ENCOUNTER — DOCUMENTATION ONLY (OUTPATIENT)
Dept: LAB | Facility: CLINIC | Age: 48
End: 2018-07-19

## 2018-07-19 NOTE — PROGRESS NOTES
This patient has overdue labs. A letter was sent on 6/11/2018 and there has been no lab appointment made. If you still want these labs done, please have your care team contact the patient to make a lab appointment. Otherwise, please have the labs discontinued and close the encounter.    Thank you,  [facility]

## 2018-11-02 ENCOUNTER — DOCUMENTATION ONLY (OUTPATIENT)
Dept: LAB | Facility: CLINIC | Age: 48
End: 2018-11-02

## 2018-11-02 DIAGNOSIS — Z13.6 CARDIOVASCULAR SCREENING; LDL GOAL LESS THAN 160: Primary | ICD-10-CM

## 2018-11-02 DIAGNOSIS — I10 HYPERTENSION GOAL BP (BLOOD PRESSURE) < 140/90: ICD-10-CM

## 2018-11-02 DIAGNOSIS — Z12.5 SCREENING FOR PROSTATE CANCER: ICD-10-CM

## 2018-11-02 DIAGNOSIS — Z00.00 ROUTINE HISTORY AND PHYSICAL EXAMINATION OF ADULT: ICD-10-CM

## 2018-11-02 NOTE — PROGRESS NOTES
.Your patient has a Previsit lab appointment on 11/8/18. Please review chart, order any additional test as future and sign the orders.  Thank You.  Ophelia Maldonado

## 2018-11-02 NOTE — PROGRESS NOTES
Please review lab orders sign and close encounter. Samreen Garner MA/DIOMEDES    Physical 11/15/18

## 2018-11-08 DIAGNOSIS — Z00.00 ROUTINE HISTORY AND PHYSICAL EXAMINATION OF ADULT: ICD-10-CM

## 2018-11-08 DIAGNOSIS — I10 HYPERTENSION GOAL BP (BLOOD PRESSURE) < 140/90: ICD-10-CM

## 2018-11-08 DIAGNOSIS — Z13.6 CARDIOVASCULAR SCREENING; LDL GOAL LESS THAN 160: ICD-10-CM

## 2018-11-08 DIAGNOSIS — Z12.5 SCREENING FOR PROSTATE CANCER: ICD-10-CM

## 2018-11-08 LAB
ALBUMIN SERPL-MCNC: 3.6 G/DL (ref 3.4–5)
ALP SERPL-CCNC: 71 U/L (ref 40–150)
ALT SERPL W P-5'-P-CCNC: 54 U/L (ref 0–70)
ANION GAP SERPL CALCULATED.3IONS-SCNC: 6 MMOL/L (ref 3–14)
AST SERPL W P-5'-P-CCNC: 40 U/L (ref 0–45)
BILIRUB SERPL-MCNC: 0.4 MG/DL (ref 0.2–1.3)
BUN SERPL-MCNC: 20 MG/DL (ref 7–30)
CALCIUM SERPL-MCNC: 8.7 MG/DL (ref 8.5–10.1)
CHLORIDE SERPL-SCNC: 106 MMOL/L (ref 94–109)
CHOLEST SERPL-MCNC: 192 MG/DL
CO2 SERPL-SCNC: 28 MMOL/L (ref 20–32)
CREAT SERPL-MCNC: 1.21 MG/DL (ref 0.66–1.25)
ERYTHROCYTE [DISTWIDTH] IN BLOOD BY AUTOMATED COUNT: 13.4 % (ref 10–15)
GFR SERPL CREATININE-BSD FRML MDRD: 63 ML/MIN/1.7M2
GLUCOSE SERPL-MCNC: 101 MG/DL (ref 70–99)
HCT VFR BLD AUTO: 45.1 % (ref 40–53)
HDLC SERPL-MCNC: 62 MG/DL
HGB BLD-MCNC: 15 G/DL (ref 13.3–17.7)
LDLC SERPL CALC-MCNC: 103 MG/DL
MCH RBC QN AUTO: 29.6 PG (ref 26.5–33)
MCHC RBC AUTO-ENTMCNC: 33.3 G/DL (ref 31.5–36.5)
MCV RBC AUTO: 89 FL (ref 78–100)
NONHDLC SERPL-MCNC: 130 MG/DL
PLATELET # BLD AUTO: 214 10E9/L (ref 150–450)
POTASSIUM SERPL-SCNC: 4.6 MMOL/L (ref 3.4–5.3)
PROT SERPL-MCNC: 7.5 G/DL (ref 6.8–8.8)
PSA SERPL-ACNC: 1.28 UG/L (ref 0–4)
RBC # BLD AUTO: 5.06 10E12/L (ref 4.4–5.9)
SODIUM SERPL-SCNC: 140 MMOL/L (ref 133–144)
TRIGL SERPL-MCNC: 137 MG/DL
WBC # BLD AUTO: 5.9 10E9/L (ref 4–11)

## 2018-11-08 PROCEDURE — G0103 PSA SCREENING: HCPCS | Performed by: FAMILY MEDICINE

## 2018-11-08 PROCEDURE — 85027 COMPLETE CBC AUTOMATED: CPT | Performed by: FAMILY MEDICINE

## 2018-11-08 PROCEDURE — 80061 LIPID PANEL: CPT | Performed by: FAMILY MEDICINE

## 2018-11-08 PROCEDURE — 80053 COMPREHEN METABOLIC PANEL: CPT | Performed by: FAMILY MEDICINE

## 2018-11-08 PROCEDURE — 36415 COLL VENOUS BLD VENIPUNCTURE: CPT | Performed by: FAMILY MEDICINE

## 2018-11-12 ASSESSMENT — ENCOUNTER SYMPTOMS
FREQUENCY: 0
CONSTIPATION: 0
FEVER: 0
NERVOUS/ANXIOUS: 0
COUGH: 0
ARTHRALGIAS: 0
HEADACHES: 0
DYSURIA: 0
DIARRHEA: 0
WEAKNESS: 0
SHORTNESS OF BREATH: 0
MYALGIAS: 0
HEMATURIA: 0
HEARTBURN: 0
SORE THROAT: 0
JOINT SWELLING: 0
CHILLS: 0
EYE PAIN: 0
HEMATOCHEZIA: 0
PARESTHESIAS: 0
PALPITATIONS: 0
NAUSEA: 0
ABDOMINAL PAIN: 0
DIZZINESS: 0

## 2018-11-15 ENCOUNTER — OFFICE VISIT (OUTPATIENT)
Dept: FAMILY MEDICINE | Facility: CLINIC | Age: 48
End: 2018-11-15
Payer: COMMERCIAL

## 2018-11-15 VITALS
BODY MASS INDEX: 27.28 KG/M2 | SYSTOLIC BLOOD PRESSURE: 130 MMHG | DIASTOLIC BLOOD PRESSURE: 84 MMHG | TEMPERATURE: 98.6 F | HEIGHT: 68 IN | HEART RATE: 85 BPM | OXYGEN SATURATION: 95 % | WEIGHT: 180 LBS | RESPIRATION RATE: 16 BRPM

## 2018-11-15 DIAGNOSIS — Z00.00 ROUTINE HISTORY AND PHYSICAL EXAMINATION OF ADULT: Primary | ICD-10-CM

## 2018-11-15 DIAGNOSIS — Z12.11 SPECIAL SCREENING FOR MALIGNANT NEOPLASMS, COLON: ICD-10-CM

## 2018-11-15 DIAGNOSIS — I10 HYPERTENSION GOAL BP (BLOOD PRESSURE) < 140/90: ICD-10-CM

## 2018-11-15 DIAGNOSIS — N52.8 OTHER MALE ERECTILE DYSFUNCTION: ICD-10-CM

## 2018-11-15 PROCEDURE — 99396 PREV VISIT EST AGE 40-64: CPT | Performed by: FAMILY MEDICINE

## 2018-11-15 RX ORDER — SILDENAFIL CITRATE 20 MG/1
20 TABLET ORAL DAILY PRN
Qty: 20 TABLET | Refills: 1 | Status: SHIPPED | OUTPATIENT
Start: 2018-11-15 | End: 2019-10-22

## 2018-11-15 RX ORDER — METOPROLOL SUCCINATE 50 MG/1
50 TABLET, EXTENDED RELEASE ORAL DAILY
Qty: 90 TABLET | Refills: 3 | Status: SHIPPED | OUTPATIENT
Start: 2018-11-15 | End: 2019-05-29

## 2018-11-15 ASSESSMENT — ENCOUNTER SYMPTOMS
ARTHRALGIAS: 0
HEARTBURN: 0
NAUSEA: 0
CHILLS: 0
DIARRHEA: 0
WEAKNESS: 0
JOINT SWELLING: 0
HEMATURIA: 0
EYE PAIN: 0
COUGH: 0
HEMATOCHEZIA: 0
PALPITATIONS: 0
FEVER: 0
ABDOMINAL PAIN: 0
PARESTHESIAS: 0
DYSURIA: 0
CONSTIPATION: 0
FREQUENCY: 0
NERVOUS/ANXIOUS: 0
HEADACHES: 0
SHORTNESS OF BREATH: 0
DIZZINESS: 0
MYALGIAS: 0
SORE THROAT: 0

## 2018-11-15 NOTE — NURSING NOTE
"Chief Complaint   Patient presents with     Physical     Health Maintenance       Initial /84  Pulse 85  Temp 98.6  F (37  C) (Oral)  Resp 16  Ht 5' 7.5\" (1.715 m)  Wt 180 lb (81.6 kg)  SpO2 95%  BMI 27.78 kg/m2 Estimated body mass index is 27.78 kg/(m^2) as calculated from the following:    Height as of this encounter: 5' 7.5\" (1.715 m).    Weight as of this encounter: 180 lb (81.6 kg).    Jazlyn Swanson CMA    "

## 2018-11-15 NOTE — MR AVS SNAPSHOT
After Visit Summary   11/15/2018    Marcus Delacruz    MRN: [id number]           Patient Information     Date Of Birth          1970        Visit Information        Provider Department      11/15/2018 8:20 AM Rj Mejia MD [facility]        Today's Diagnoses     Routine history and physical examination of adult    -  1    Hypertension goal BP (blood pressure) < 140/90        Other male erectile dysfunction        Special screening for malignant neoplasms, colon           Follow-ups after your visit        Additional Services     GASTROENTEROLOGY ADULT REF PROCEDURE ONLY Other; [facility] [phone]       Last Lab Result: Creatinine (mg/dL)       Date                     Value                 11/08/2018               1.21             ----------  There is no height or weight on file to calculate BMI.      Patient will be contacted to schedule procedure.     Please be aware that coverage of these services is subject to the terms and limitations of your health insurance plan.  Call member services at your health plan with any benefit or coverage questions.  Any procedures must be performed at a [place] facility OR coordinated by your clinic's referral office.    Please bring the following with you to your appointment:    (1) Any X-Rays, CTs or MRIs which have been performed.  Contact the facility where they were done to arrange for  prior to your scheduled appointment.    (2) List of current medications   (3) This referral request   (4) Any documents/labs given to you for this referral                  Who to contact     If you have questions or need follow up information about today's clinic visit or your schedule please contact [facility] directly at [phone].  Normal or non-critical lab and imaging results will be communicated to you by MyChart, letter or phone within 4 business days after the clinic has received the results. If you do not hear from  "us within 7 days, please contact the clinic through [employer] or phone. If you have a critical or abnormal lab result, we will notify you by phone as soon as possible.  Submit refill requests through [employer] or call your pharmacy and they will forward the refill request to us. Please allow 3 business days for your refill to be completed.          Additional Information About Your Visit        [employer] Information     [employer] gives you secure access to your electronic health record. If you see a primary care provider, you can also send messages to your care team and make appointments. If you have questions, please call your primary care clinic.  If you do not have a primary care provider, please call [phone] and they will assist you.        Care EveryWhere ID     This is your Care EveryWhere ID. This could be used by other organizations to access your [place] medical records  [id number]        Your Vitals Were     Pulse Temperature Respirations Height Pulse Oximetry BMI (Body Mass Index)    85 98.6  F (37  C) (Oral) 16 5' 7.5\" (1.715 m) 95% 27.78 kg/m2       Blood Pressure from Last 3 Encounters:   11/15/18 130/84   11/13/17 118/72   08/14/17 148/88    Weight from Last 3 Encounters:   11/15/18 180 lb (81.6 kg)   11/13/17 172 lb (78 kg)   08/14/17 173 lb (78.5 kg)              We Performed the Following     GASTROENTEROLOGY ADULT REF PROCEDURE ONLY Other; [facility] [phone]          Today's Medication Changes          These changes are accurate as of 11/15/18  8:53 AM.  If you have any questions, ask your nurse or doctor.               These medicines have changed or have updated prescriptions.        Dose/Directions    metoprolol succinate 50 MG 24 hr tablet   Commonly known as:  TOPROL-XL   This may have changed:  additional instructions   Used for:  Hypertension goal BP (blood pressure) < 140/90   Changed by:  Rj Mejia MD        Dose:  50 mg   Take 1 tablet (50 mg) by mouth daily for blood pressure " take at bedtime   Quantity:  90 tablet   Refills:  3       sildenafil 20 MG tablet   Commonly known as:  REVATIO   This may have changed:  additional instructions   Used for:  Other male erectile dysfunction   Changed by:  Rj Mejia MD        Dose:  20 mg   Take 1 tablet (20 mg) by mouth daily as needed May double dosage if needed for ED   Quantity:  20 tablet   Refills:  1            Where to get your medicines      These medications were sent to [facility] [address], [address]  [address], [facility] [address], [facility] [address]     Phone:  [phone]     metoprolol succinate 50 MG 24 hr tablet    sildenafil 20 MG tablet                Primary Care Provider Office Phone # Fax #    Rj Mejia [phone] [address] [address] [facility] [address]        Equal Access to Services     [facility]: Hadii liu donovan hadasho Soomaali, waaxda luqadaha, qaybta kaalmada adeegyada, waxlinda diaz haylana [facility]od . So [facility] [phone].    ATENCIÓN: Si habla español, tiene a mcdaniel disposición servicios gratuitos de asistencia lingüística. [facility] [phone].    We comply with applicable federal civil rights laws and [place] laws. We do not discriminate on the basis of race, color, national origin, age, disability, sex, sexual orientation, or gender identity.            Thank you!     Thank you for choosing [facility]  for your care. Our goal is always to provide you with excellent care. Hearing back from our patients is one way we can continue to improve our services. Please take a few minutes to complete the written survey that you may receive in the mail after your visit with us. Thank you!             Your Updated Medication List - Protect others around you: Learn how to safely use, store and throw away your medicines at www.disposemymeds.org.          This list is accurate as of 11/15/18  8:53 AM.  Always use your most recent  med list.                   Brand Name Dispense Instructions for use Diagnosis    metoprolol succinate 50 MG 24 hr tablet    TOPROL-XL    90 tablet    Take 1 tablet (50 mg) by mouth daily for blood pressure take at bedtime    Hypertension goal BP (blood pressure) < 140/90       sildenafil 20 MG tablet    REVATIO    20 tablet    Take 1 tablet (20 mg) by mouth daily as needed May double dosage if needed for ED    Other male erectile dysfunction

## 2018-11-15 NOTE — PROGRESS NOTES
SUBJECTIVE:   CC: Marcus Delacruz is an 48 year old male who presents for preventative health visit.   No family history colon cancer. No black or bloody stools.   Exercise - was until recently. No chest pain or shortness of breath.   Family history htn. Off tropol. .     Physical   Annual:     Getting at least 3 servings of Calcium per day:  Yes    Bi-annual eye exam:  Yes    Dental care twice a year:  Yes    Sleep apnea or symptoms of sleep apnea:  None    Diet:  Regular (no restrictions)    Frequency of exercise:  4-5 days/week    Duration of exercise:  45-60 minutes    Taking medications regularly:  Yes    Medication side effects:  None    Additional concerns today:  No      -------------------------------------    Today's PHQ-2 Score:   PHQ-2 ( 1999 [employer]) 11/12/2018   Q1: Little interest or pleasure in doing things 0   Q2: Feeling down, depressed or hopeless 0   PHQ-2 Score 0   Q1: Little interest or pleasure in doing things Not at all   Q2: Feeling down, depressed or hopeless Not at all   PHQ-2 Score 0       Abuse: Current or Past(Physical, Sexual or Emotional)- No  Do you feel safe in your environment - Yes    Social History   Substance Use Topics     Smoking status: Never Smoker     Smokeless tobacco: Former User      Comment: NON SMOKING HOME     Alcohol use Yes     Alcohol Use 11/12/2018   If you drink alcohol do you typically have greater than 3 drinks per day OR greater than 7 drinks per week? No       Last PSA:   PSA   Date Value Ref Range Status   11/08/2018 1.28 0 - 4 ug/L Final     Comment:     Assay Method:  Chemiluminescence using Siemens Vista analyzer       Reviewed orders with patient. Reviewed health maintenance and updated orders accordingly - Yes  Labs reviewed in EPIC    Reviewed and updated as needed this visit by clinical staff         Reviewed and updated as needed this visit by Provider            Review of Systems   Constitutional: Negative for chills and fever.   HENT: Negative for  "congestion, ear pain, hearing loss and sore throat.    Eyes: Negative for pain and visual disturbance.   Respiratory: Negative for cough and shortness of breath.    Cardiovascular: Negative for chest pain, palpitations and peripheral edema.   Gastrointestinal: Negative for abdominal pain, constipation, diarrhea, heartburn, hematochezia and nausea.   Genitourinary: Negative for discharge, dysuria, frequency, genital sores, hematuria, impotence and urgency.   Musculoskeletal: Negative for arthralgias, joint swelling and myalgias.   Skin: Negative for rash.   Neurological: Negative for dizziness, weakness, headaches and paresthesias.   Psychiatric/Behavioral: Negative for mood changes. The patient is not nervous/anxious.      All other ROS.   Seeing dermatology in winter.   Emotionally doing ok. Family ok.     OBJECTIVE:   There were no vitals taken for this visit.  /84  Pulse 85  Temp 98.6  F (37  C) (Oral)  Resp 16  Ht 5' 7.5\" (1.715 m)  Wt 180 lb (81.6 kg)  SpO2 95%  BMI 27.78 kg/m2    Physical Exam  GENERAL: healthy, alert and no distress  EYES: Eyes grossly normal to inspection, PERRL and conjunctivae and sclerae normal  HENT: ear canals and TM's normal, nose and mouth without ulcers or lesions  NECK: no adenopathy, no asymmetry, masses, or scars and thyroid normal to palpation  RESP: lungs clear to auscultation - no rales, rhonchi or wheezes  BREAST: normal without masses, tenderness or nipple discharge and no palpable axillary masses or adenopathy  CV: regular rate and rhythm, normal S1 S2, no S3 or S4, no murmur, click or rub, no peripheral edema and peripheral pulses strong  ABDOMEN: soft, nontender, no hepatosplenomegaly, no masses and bowel sounds normal   (male): patient deferred exam and rectal. Denies pain/masses/lesions/hernia  SKIN: no suspicious lesions or rashes  NEURO: Normal strength and tone, mentation intact and speech normal  BACK: no CVA tenderness, no paralumbar " "tenderness  PSYCH: mentation appears normal, affect normal/bright  LYMPH: no cervical, supraclavicular, axillary, or inguinal adenopathy      ASSESSMENT/PLAN:   ASSESSMENT / PLAN:  (Z00.00) Routine history and physical examination of adult  (primary encounter diagnosis)  Comment: generally healthy and normal exam/labs  Plan: Reviewed self mole/testicle check handout.  Seeing dermatology    (I10) Hypertension goal BP (blood pressure) < 140/90  Comment: borderline high. Family history and outside blood pressure high. Non-smoker and lipids ok  Plan: metoprolol succinate (TOPROL-XL) 50 MG 24 hr         tablet        Restart toprol. Continue exercise. Reveiwed risks and side effects of medication  Chest pain or shortness of breath to er.     (N52.8) Other male erectile dysfunction  Plan: sildenafil (REVATIO) 20 MG tablet        To ER if chest pain, shortness of breath , prolonged erections      (Z12.11) Special screening for malignant neoplasms, colon    Plan: GASTROENTEROLOGY ADULT REF PROCEDURE ONLY         Other; [facility] [phone]                COUNSELING:   Reviewed preventive health counseling, as reflected in patient instructions       Regular exercise       Healthy diet/nutrition       Vision screening       Colon cancer screening       Prostate cancer screening       Osteoporosis Prevention/Bone Health    BP Readings from Last 1 Encounters:   11/13/17 118/72     Estimated body mass index is 26.54 kg/(m^2) as calculated from the following:    Height as of 8/14/17: 5' 7.5\" (1.715 m).    Weight as of 11/13/17: 172 lb (78 kg).           reports that he has never smoked. He has quit using smokeless tobacco.      Counseling Resources:  ATP IV Guidelines  Pooled Cohorts Equation Calculator  FRAX Risk Assessment  ICSI Preventive Guidelines  Dietary Guidelines for Americans, 2010  USDA's MyPlate  ASA Prophylaxis  Lung CA Screening    Rj Mejia MD  [facility]  "

## 2018-11-29 DIAGNOSIS — I10 ESSENTIAL HYPERTENSION, MALIGNANT: ICD-10-CM

## 2018-11-29 DIAGNOSIS — Q60.0 UNILATERAL AGENESIS OF KIDNEY: ICD-10-CM

## 2018-11-29 DIAGNOSIS — Z87.442 PERSONAL HISTORY OF URINARY CALCULI: Primary | ICD-10-CM

## 2018-12-07 ENCOUNTER — TRANSFERRED RECORDS (OUTPATIENT)
Dept: HEALTH INFORMATION MANAGEMENT | Facility: CLINIC | Age: 48
End: 2018-12-07

## 2018-12-07 DIAGNOSIS — I10 ESSENTIAL HYPERTENSION, MALIGNANT: ICD-10-CM

## 2018-12-07 DIAGNOSIS — Z87.442 PERSONAL HISTORY OF URINARY CALCULI: ICD-10-CM

## 2018-12-07 DIAGNOSIS — Q60.0 UNILATERAL AGENESIS OF KIDNEY: ICD-10-CM

## 2018-12-07 LAB
ALBUMIN SERPL-MCNC: 3.9 G/DL (ref 3.4–5)
ANION GAP SERPL CALCULATED.3IONS-SCNC: 6 MMOL/L (ref 3–14)
BUN SERPL-MCNC: 15 MG/DL (ref 7–30)
CALCIUM SERPL-MCNC: 9.1 MG/DL (ref 8.5–10.1)
CHLORIDE SERPL-SCNC: 103 MMOL/L (ref 94–109)
CO2 SERPL-SCNC: 28 MMOL/L (ref 20–32)
CREAT SERPL-MCNC: 1.05 MG/DL (ref 0.66–1.25)
GFR SERPL CREATININE-BSD FRML MDRD: 75 ML/MIN/1.7M2
GLUCOSE SERPL-MCNC: 112 MG/DL (ref 70–99)
PHOSPHATE SERPL-MCNC: 3.1 MG/DL (ref 2.5–4.5)
POTASSIUM SERPL-SCNC: 4.1 MMOL/L (ref 3.4–5.3)
SODIUM SERPL-SCNC: 137 MMOL/L (ref 133–144)

## 2018-12-07 PROCEDURE — 80069 RENAL FUNCTION PANEL: CPT | Performed by: INTERNAL MEDICINE

## 2018-12-07 PROCEDURE — 84540 ASSAY OF URINE/UREA-N: CPT | Mod: 90 | Performed by: INTERNAL MEDICINE

## 2018-12-07 PROCEDURE — 83986 ASSAY PH BODY FLUID NOS: CPT | Mod: 90 | Performed by: INTERNAL MEDICINE

## 2018-12-07 PROCEDURE — 82340 ASSAY OF CALCIUM IN URINE: CPT | Mod: 90 | Performed by: INTERNAL MEDICINE

## 2018-12-07 PROCEDURE — 82436 ASSAY OF URINE CHLORIDE: CPT | Mod: 90 | Performed by: INTERNAL MEDICINE

## 2018-12-07 PROCEDURE — 82570 ASSAY OF URINE CREATININE: CPT | Mod: 90 | Performed by: INTERNAL MEDICINE

## 2018-12-07 PROCEDURE — 84133 ASSAY OF URINE POTASSIUM: CPT | Mod: 90 | Performed by: INTERNAL MEDICINE

## 2018-12-07 PROCEDURE — 82140 ASSAY OF AMMONIA: CPT | Mod: 90 | Performed by: INTERNAL MEDICINE

## 2018-12-07 PROCEDURE — 84105 ASSAY OF URINE PHOSPHORUS: CPT | Mod: 90 | Performed by: INTERNAL MEDICINE

## 2018-12-07 PROCEDURE — 82507 ASSAY OF CITRATE: CPT | Mod: 90 | Performed by: INTERNAL MEDICINE

## 2018-12-07 PROCEDURE — 36415 COLL VENOUS BLD VENIPUNCTURE: CPT | Performed by: INTERNAL MEDICINE

## 2018-12-07 PROCEDURE — 84392 ASSAY OF URINE SULFATE: CPT | Mod: 90 | Performed by: INTERNAL MEDICINE

## 2018-12-07 PROCEDURE — 83945 ASSAY OF OXALATE: CPT | Mod: 90 | Performed by: INTERNAL MEDICINE

## 2018-12-07 PROCEDURE — 99000 SPECIMEN HANDLING OFFICE-LAB: CPT | Performed by: INTERNAL MEDICINE

## 2018-12-07 PROCEDURE — 84300 ASSAY OF URINE SODIUM: CPT | Mod: 90 | Performed by: INTERNAL MEDICINE

## 2018-12-07 PROCEDURE — 83735 ASSAY OF MAGNESIUM: CPT | Mod: 90 | Performed by: INTERNAL MEDICINE

## 2018-12-07 PROCEDURE — 84560 ASSAY OF URINE/URIC ACID: CPT | Mod: 90 | Performed by: INTERNAL MEDICINE

## 2018-12-07 PROCEDURE — 83935 ASSAY OF URINE OSMOLALITY: CPT | Mod: 90 | Performed by: INTERNAL MEDICINE

## 2018-12-11 LAB
CA H2 PHOS DIHYD 24H SATFR UR: ABNORMAL DG
CHLORIDE 24H UR-SRATE: 80 MMOL/24 H
CITRATE 24H UR-SRATE: 108 MG/24 H
CLINICAL BIOCHEMIST REVIEW: ABNORMAL
COLLECT DURATION TIME UR: 24 H
COLLECT DURATION TIME UR: ABNORMAL DG
CREAT 24H UR-MRATE: 1824 MG/24 H
OSMOLALITY UR: 303 MOSM/KG
OXALATE 24H UR-MRATE: 32.6 MG/24 H
OXALATE 24H UR-SRATE: 0.37 MMOL/24 H
PH 24H UR: 5.6 [PH]
PHOSPHATE 24H UR-MRATE: 1425 MG/24 H
POTASSIUM 24H UR-SRATE: 91 MMOL/24 H
SODIUM 24H UR-SRATE: 66 MMOL/24 H
SPECIMEN VOL 24H UR: 2850 ML
SULFATE 24H UR-SRATE: 22 MMOL/24 H
SUPERSAT 24 AMMONIUM 24 HR UR: 34 MMOL/24 H
SUPERSAT 24 CALCIUM OXALATE CRYSTAL: 1.46 DG
SUPERSAT 24 CALCIUM URINE: 200 MG/24 H
SUPERSAT 24 HYDROXYAPATITE CRYSTAL: 1.99 DG
SUPERSAT 24 MAGNESIUM URINE: 143 MG/24 H
SUPERSAT 24 PROTEIN CATABOLIC RATE UR: 96 G/24 H
SUPERSAT 24 UREA NITROGEN UR: 11.3 G/24 H
URATE 24H SATFR UR: 1.9 DG
URATE 24H UR-MRATE: 741 MG/24 H

## 2018-12-13 ENCOUNTER — TRANSFERRED RECORDS (OUTPATIENT)
Dept: HEALTH INFORMATION MANAGEMENT | Facility: CLINIC | Age: 48
End: 2018-12-13
Payer: COMMERCIAL

## 2019-01-01 ENCOUNTER — TRANSFERRED RECORDS (OUTPATIENT)
Dept: HEALTH INFORMATION MANAGEMENT | Facility: CLINIC | Age: 49
End: 2019-01-01

## 2019-04-30 ENCOUNTER — TRANSFERRED RECORDS (OUTPATIENT)
Dept: HEALTH INFORMATION MANAGEMENT | Facility: CLINIC | Age: 49
End: 2019-04-30

## 2019-05-07 ENCOUNTER — TRANSFERRED RECORDS (OUTPATIENT)
Dept: HEALTH INFORMATION MANAGEMENT | Facility: CLINIC | Age: 49
End: 2019-05-07

## 2019-05-09 ENCOUNTER — OFFICE VISIT (OUTPATIENT)
Dept: FAMILY MEDICINE | Facility: CLINIC | Age: 49
End: 2019-05-09
Payer: COMMERCIAL

## 2019-05-09 VITALS
DIASTOLIC BLOOD PRESSURE: 82 MMHG | HEIGHT: 68 IN | SYSTOLIC BLOOD PRESSURE: 122 MMHG | TEMPERATURE: 98.1 F | OXYGEN SATURATION: 99 % | RESPIRATION RATE: 16 BRPM | WEIGHT: 182 LBS | HEART RATE: 77 BPM | BODY MASS INDEX: 27.58 KG/M2

## 2019-05-09 DIAGNOSIS — Z01.818 PREOP GENERAL PHYSICAL EXAM: Primary | ICD-10-CM

## 2019-05-09 DIAGNOSIS — M25.561 RIGHT KNEE PAIN, UNSPECIFIED CHRONICITY: ICD-10-CM

## 2019-05-09 DIAGNOSIS — I10 HYPERTENSION GOAL BP (BLOOD PRESSURE) < 140/90: ICD-10-CM

## 2019-05-09 LAB — HGB BLD-MCNC: 15.2 G/DL (ref 13.3–17.7)

## 2019-05-09 PROCEDURE — 36415 COLL VENOUS BLD VENIPUNCTURE: CPT | Performed by: PHYSICIAN ASSISTANT

## 2019-05-09 PROCEDURE — 99214 OFFICE O/P EST MOD 30 MIN: CPT | Performed by: PHYSICIAN ASSISTANT

## 2019-05-09 PROCEDURE — 85018 HEMOGLOBIN: CPT | Performed by: PHYSICIAN ASSISTANT

## 2019-05-09 PROCEDURE — 93000 ELECTROCARDIOGRAM COMPLETE: CPT | Performed by: PHYSICIAN ASSISTANT

## 2019-05-09 ASSESSMENT — MIFFLIN-ST. JEOR: SCORE: 1652.11

## 2019-05-09 NOTE — PROGRESS NOTES
[facility]  [address] [facility] [address] [phone]  Dept: [phone]    PRE-OP EVALUATION:  Today's date: 2019    Marcus Delacruz (: 1970) presents for pre-operative evaluation assessment as requested by Dr. Banks.  He requires evaluation and anesthesia risk assessment prior to undergoing surgery/procedure for treatment of right knee pain  .    Fax number for surgical facility: [phone]  Primary Physician: Rj Mejia  Type of Anesthesia Anticipated: General    Patient has a Health Care Directive or Living Will:  NO    Preop Questions 2019   Who is doing your surgery? georgiana christensen   What are you having done? meniscus   Date of Surgery/Procedure: may 15   Facility or Hospital where procedure/surgery will be performed: [facility]   1.  Do you have a history of Heart attack, stroke, stent, coronary bypass surgery, or other heart surgery? No   2.  Do you ever have any pain or discomfort in your chest? No   3.  Do you have a history of  Heart Failure? No   4.   Are you troubled by shortness of breath when:  walking on a level surface, or up a slight hill, or at night? No   5.  Do you currently have a cold, bronchitis or other respiratory infection? No   6.  Do you have a cough, shortness of breath, or wheezing? No   7.  Do you sometimes get pains in the calves of your legs when you walk? No   8. Do you or anyone in your family have previous history of blood clots? No   9.  Do you or does anyone in your family have a serious bleeding problem such as prolonged bleeding following surgeries or cuts? No   10. Have you ever had problems with anemia or been told to take iron pills? No   11. Have you had any abnormal blood loss such as black, tarry or bloody stools? No   12. Have you ever had a blood transfusion? No   13. Have you or any of your relatives ever had problems with anesthesia? No   14. Do you have sleep apnea, excessive snoring or  daytime drowsiness? No   15. Do you have any prosthetic heart valves? No   16. Do you have prosthetic joints? No         HPI:     HPI related to upcoming procedure: 6wks ago felt pop in knee while doing squats. Found to have a meniscus tear.     See problem list for active medical problems.  Problems all longstanding and stable, except as noted/documented.  See ROS for pertinent symptoms related to these conditions.                                                                                                                                                          .    MEDICAL HISTORY:     Patient Active Problem List    Diagnosis Date Noted     Other male erectile dysfunction 11/11/2016     Priority: Medium     Hyperglycemia 11/11/2016     Priority: Medium     Special screening for malignant neoplasm of prostate 10/22/2015     Priority: Medium     Rotator cuff tear 05/12/2015     Priority: Medium     Hypertension goal BP (blood pressure) < 140/90 10/09/2014     Priority: Medium     Microhematuria 10/09/2014     Priority: Medium     Elevated blood pressure reading without diagnosis of hypertension 09/19/2012     Priority: Medium     Pain in joint, lower leg 10/20/2011     Priority: Medium     Kidney congenitally absent, 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     Priority: Medium     Kidney stones 09/16/2011     Priority: Medium     CARDIOVASCULAR SCREENING; LDL GOAL LESS THAN 160 10/31/2010     Priority: Medium      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     Kidney stones 9/16/2011     Past Surgical History:   Procedure Laterality Date     EXTRACORPOREAL SHOCK WAVE LITHOTRIPSY (ESWL)  .3.4.2016     GENITOURINARY SURGERY  Kidney Stones    21 Lithotripsy procedures     HERNIA REPAIR  2017     ORTHOPEDIC SURGERY  2015    right shoulder rotor cuff     TONSILLECTOMY  2008     Current Outpatient Medications   Medication Sig Dispense  "Refill     metoprolol succinate (TOPROL-XL) 50 MG 24 hr tablet Take 1 tablet (50 mg) by mouth daily for blood pressure take at bedtime 90 tablet 3     sildenafil (REVATIO) 20 MG tablet Take 1 tablet (20 mg) by mouth daily as needed May double dosage if needed for ED 20 tablet 1     OTC products: None, except as noted above    Allergies   Allergen Reactions     Cephalosporins Anaphylaxis     Severe reaction. anaphylaxis     Azo [Phenazopyridine] Hives     Flomax [Tamsulosin] Hives     Levaquin [Levofloxacin Hemihydrate] Hives     Amoxicillin Hives     Detrol [Tolterodine Tartrate] Hives      Latex Allergy: NO    Social History     Tobacco Use     Smoking status: Never Smoker     Smokeless tobacco: Former User     Tobacco comment: NON SMOKING HOME   Substance Use Topics     Alcohol use: Yes     History   Drug Use No       REVIEW OF SYSTEMS:   CONSTITUTIONAL: NEGATIVE for fever, chills, change in weight  INTEGUMENTARY/SKIN: NEGATIVE for worrisome rashes, moles or lesions  EYES: NEGATIVE for vision changes or irritation  ENT/MOUTH: NEGATIVE for ear, mouth and throat problems  RESP: NEGATIVE for significant cough or SOB  CV: NEGATIVE for chest pain, palpitations or peripheral edema  GI: NEGATIVE for nausea, abdominal pain, heartburn, or change in bowel habits  : NEGATIVE for frequency, dysuria, or hematuria  MUSCULOSKELETAL: NEGATIVE for significant arthralgias or myalgia  NEURO: NEGATIVE for weakness, dizziness or paresthesias  ENDOCRINE: NEGATIVE for temperature intolerance, skin/hair changes  HEME: NEGATIVE for bleeding problems  PSYCHIATRIC: NEGATIVE for changes in mood or affect    EXAM:   /82   Pulse 77   Temp 98.1  F (36.7  C) (Oral)   Resp 16   Ht 1.715 m (5' 7.5\")   Wt 82.6 kg (182 lb)   SpO2 99%   BMI 28.08 kg/m      GENERAL APPEARANCE: healthy, alert and no distress     EYES: EOMI,  PERRL     HENT: ear canals and TM's normal and nose and mouth without ulcers or lesions     NECK: no adenopathy, " no asymmetry, masses, or scars and thyroid normal to palpation     RESP: lungs clear to auscultation - no rales, rhonchi or wheezes     CV: regular rates and rhythm, normal S1 S2, no S3 or S4 and no murmur, click or rub     ABDOMEN:  soft, nontender, no HSM or masses and bowel sounds normal     MS: extremities normal- no gross deformities noted, no evidence of inflammation in joints, FROM in all extremities.     SKIN: no suspicious lesions or rashes     NEURO: Normal strength and tone, sensory exam grossly normal, mentation intact and speech normal     PSYCH: mentation appears normal. and affect normal/bright     LYMPHATICS: No cervical adenopathy    DIAGNOSTICS:     EKG: appears normal, NSR, normal axis, normal intervals, no acute ST/T changes c/w ischemia, no LVH by voltage criteria  Labs Drawn and in Process:   Unresulted Labs Ordered in the Past 30 Days of this Admission     No orders found from 3/10/2019 to 5/10/2019.          Recent Labs   Lab Test 12/07/18  1026 11/08/18  0906  05/19/17  0927   HGB  --  15.0  --  15.8   PLT  --  214  --  206    140   < >  --    POTASSIUM 4.1 4.6   < >  --    CR 1.05 1.21   < >  --     < > = values in this interval not displayed.        IMPRESSION:   Reason for surgery/procedure:  treatment of right knee pain  .      The proposed surgical procedure is considered LOW risk.    REVISED CARDIAC RISK INDEX  The patient has the following serious cardiovascular risks for perioperative complications such as (MI, PE, VFib and 3  AV Block):  No serious cardiac risks  INTERPRETATION: 0 risks: Class I (very low risk - 0.4% complication rate)    The patient has the following additional risks for perioperative complications:  No identified additional risks      ICD-10-CM    1. Preop general physical exam Z01.818 EKG 12-lead complete w/read - Clinics     Hemoglobin   2. Right knee pain, unspecified chronicity M25.561    3. Hypertension goal BP (blood pressure) < 140/90 I10         RECOMMENDATIONS:         APPROVAL GIVEN to proceed with proposed procedure, without further diagnostic evaluation       Signed Electronically by: RENEE Reid MD  Copy of this evaluation report is provided to requesting physician.    [facility] Preop Guidelines    Revised Cardiac Risk Index

## 2019-05-28 ENCOUNTER — TRANSFERRED RECORDS (OUTPATIENT)
Dept: HEALTH INFORMATION MANAGEMENT | Facility: CLINIC | Age: 49
End: 2019-05-28

## 2019-05-29 ENCOUNTER — TELEPHONE (OUTPATIENT)
Dept: FAMILY MEDICINE | Facility: CLINIC | Age: 49
End: 2019-05-29

## 2019-05-29 DIAGNOSIS — I10 HYPERTENSION GOAL BP (BLOOD PRESSURE) < 140/90: ICD-10-CM

## 2019-05-29 NOTE — TELEPHONE ENCOUNTER
Patient is calling to request RX: metoprolol succinate (TOPROL-XL) 50 MG 24 hr tablet, per pt RX should be mailed directly to  Home address. Thank you.

## 2019-05-30 RX ORDER — METOPROLOL SUCCINATE 50 MG/1
50 TABLET, EXTENDED RELEASE ORAL DAILY
Qty: 90 TABLET | Refills: 1 | Status: SHIPPED | OUTPATIENT
Start: 2019-05-30 | End: 2020-01-03

## 2019-05-30 NOTE — TELEPHONE ENCOUNTER
Mailed RX to patient's home. Left message on his voicemail that this was done.Samreen Garner MA/DIOMEDES

## 2019-07-16 ENCOUNTER — TRANSFERRED RECORDS (OUTPATIENT)
Dept: HEALTH INFORMATION MANAGEMENT | Facility: CLINIC | Age: 49
End: 2019-07-16

## 2019-10-17 ENCOUNTER — MEDICAL CORRESPONDENCE (OUTPATIENT)
Dept: HEALTH INFORMATION MANAGEMENT | Facility: CLINIC | Age: 49
End: 2019-10-17

## 2019-10-17 DIAGNOSIS — Z13.6 CARDIOVASCULAR SCREENING; LDL GOAL LESS THAN 160: ICD-10-CM

## 2019-10-17 DIAGNOSIS — Z00.00 ROUTINE HISTORY AND PHYSICAL EXAMINATION OF ADULT: ICD-10-CM

## 2019-10-17 DIAGNOSIS — I10 HYPERTENSION GOAL BP (BLOOD PRESSURE) < 140/90: ICD-10-CM

## 2019-10-17 DIAGNOSIS — Z12.5 SCREENING FOR PROSTATE CANCER: ICD-10-CM

## 2019-10-17 LAB
ANION GAP SERPL CALCULATED.3IONS-SCNC: 6 MMOL/L (ref 3–14)
BUN SERPL-MCNC: 14 MG/DL (ref 7–30)
CALCIUM SERPL-MCNC: 9.2 MG/DL (ref 8.5–10.1)
CHLORIDE SERPL-SCNC: 106 MMOL/L (ref 94–109)
CHOLEST SERPL-MCNC: 189 MG/DL
CO2 SERPL-SCNC: 31 MMOL/L (ref 20–32)
CREAT SERPL-MCNC: 0.92 MG/DL (ref 0.66–1.25)
GFR SERPL CREATININE-BSD FRML MDRD: >90 ML/MIN/{1.73_M2}
GLUCOSE SERPL-MCNC: 98 MG/DL (ref 70–99)
HDLC SERPL-MCNC: 47 MG/DL
LDLC SERPL CALC-MCNC: 93 MG/DL
NONHDLC SERPL-MCNC: 142 MG/DL
POTASSIUM SERPL-SCNC: 4.6 MMOL/L (ref 3.4–5.3)
PSA SERPL-ACNC: 0.86 UG/L (ref 0–4)
SODIUM SERPL-SCNC: 143 MMOL/L (ref 133–144)
TRIGL SERPL-MCNC: 244 MG/DL

## 2019-10-17 PROCEDURE — 80061 LIPID PANEL: CPT | Performed by: FAMILY MEDICINE

## 2019-10-17 PROCEDURE — 80048 BASIC METABOLIC PNL TOTAL CA: CPT | Performed by: FAMILY MEDICINE

## 2019-10-17 PROCEDURE — 36415 COLL VENOUS BLD VENIPUNCTURE: CPT | Performed by: FAMILY MEDICINE

## 2019-10-17 PROCEDURE — G0103 PSA SCREENING: HCPCS | Performed by: FAMILY MEDICINE

## 2019-10-21 NOTE — PROGRESS NOTES
SUBJECTIVE:   CC: Marcus Delacruz is an 49 year old male who presents for preventative health visit.     Healthy Habits:     Getting at least 3 servings of Calcium per day:  Yes    Bi-annual eye exam:  Yes    Dental care twice a year:  Yes    Sleep apnea or symptoms of sleep apnea:  None    Diet:  Regular (no restrictions)    Frequency of exercise:  2-3 days/week    Duration of exercise:  30-45 minutes    Taking medications regularly:  Yes    Medication side effects:  None    PHQ-2 Total Score: 0    Additional concerns today:  No    The 10-year ASCVD risk score (Diego MISTRY JrHan, et al., 2013) is: 6.2%    Values used to calculate the score:      Age: 49 years      Sex: Male      Is Non- : No      Diabetic: No      Tobacco smoker: No      Systolic Blood Pressure: 155 mmHg      Is BP treated: Yes      HDL Cholesterol: 47 mg/dL      Total Cholesterol: 189 mg/dL      Today's PHQ-2 Score:   PHQ-2 ( 1999 Pfizer) 10/22/2019   Q1: Little interest or pleasure in doing things 0   Q2: Feeling down, depressed or hopeless 0   PHQ-2 Score 0   Q1: Little interest or pleasure in doing things Not at all   Q2: Feeling down, depressed or hopeless Not at all   PHQ-2 Score 0       Abuse: Current or Past(Physical, Sexual or Emotional)- No  Do you feel safe in your environment? Yes    Social History     Tobacco Use     Smoking status: Never Smoker     Smokeless tobacco: Former User     Tobacco comment: NON SMOKING HOME   Substance Use Topics     Alcohol use: Yes     If you drink alcohol do you typically have >3 drinks per day or >7 drinks per week? No    Alcohol Use 10/22/2019   Prescreen: >3 drinks/day or >7 drinks/week? No   Prescreen: >3 drinks/day or >7 drinks/week? -   No flowsheet data found.    Last PSA:   PSA   Date Value Ref Range Status   10/17/2019 0.86 0 - 4 ug/L Final     Comment:     Assay Method:  Chemiluminescence using Siemens Vista analyzer     Family History   Problem Relation Age of Onset     Cancer  Father 68        pancreatic cancer     Diabetes Father      Other Cancer Father        Reviewed orders with patient. Reviewed health maintenance and updated orders accordingly - Yes  Labs reviewed in EPIC  BP Readings from Last 3 Encounters:   10/22/19 (!) 155/100   05/09/19 122/82   11/15/18 130/84    Wt Readings from Last 3 Encounters:   10/22/19 83.5 kg (184 lb)   05/09/19 82.6 kg (182 lb)   11/15/18 81.6 kg (180 lb)                  Patient Active Problem List   Diagnosis     CARDIOVASCULAR SCREENING; LDL GOAL LESS THAN 160     Kidney congenitally absent, left     Kidney stones     Pain in joint, lower leg     Elevated blood pressure reading without diagnosis of hypertension     Hypertension goal BP (blood pressure) < 140/90     Microhematuria     Rotator cuff tear     Special screening for malignant neoplasm of prostate     Other male erectile dysfunction     Hyperglycemia     Past Surgical History:   Procedure Laterality Date     EXTRACORPOREAL SHOCK WAVE LITHOTRIPSY (ESWL)  .3.4.2016     GENITOURINARY SURGERY  Kidney Stones    21 Lithotripsy procedures     HERNIA REPAIR  2017     ORTHOPEDIC SURGERY  2015    right shoulder rotor cuff     TONSILLECTOMY  2008       Social History     Tobacco Use     Smoking status: Never Smoker     Smokeless tobacco: Former User     Tobacco comment: NON SMOKING HOME   Substance Use Topics     Alcohol use: Yes     Family History   Problem Relation Age of Onset     Cancer Father 68        pancreatic cancer     Diabetes Father      Other Cancer Father          Current Outpatient Medications   Medication Sig Dispense Refill     amLODIPine (NORVASC) 5 MG tablet Take 1 tablet (5 mg) by mouth daily 30 tablet 3     metoprolol succinate ER (TOPROL-XL) 50 MG 24 hr tablet Take 1 tablet (50 mg) by mouth daily for blood pressure take at bedtime 90 tablet 1     Allergies   Allergen Reactions     Cephalosporins Anaphylaxis     Severe reaction. anaphylaxis     Azo [Phenazopyridine] Hives      Flomax [Tamsulosin] Hives     Levaquin [Levofloxacin Hemihydrate] Hives     Amoxicillin Hives     Detrol [Tolterodine Tartrate] Hives     Recent Labs   Lab Test 10/17/19  0847 12/07/18  1026 11/08/18  0906 11/06/17  0927  12/01/15  0903 10/22/15  0821   LDL 93  --  103* 108*   < >  --  122   HDL 47  --  62 57   < >  --  44   TRIG 244*  --  137 137   < >  --  125   ALT  --   --  54  --   --  49 98*   CR 0.92 1.05 1.21 0.99   < >  --  1.05   GFRESTIMATED >90 75 63 80   < >  --  76   GFRESTBLACK >90 >90 77 >90   < >  --  >90   GFR Calc     POTASSIUM 4.6 4.1 4.6 4.3   < >  --  4.0    < > = values in this interval not displayed.        Reviewed and updated as needed this visit by clinical staff  Tobacco  Allergies  Meds  Problems  Med Hx  Surg Hx  Fam Hx  Soc Hx          Reviewed and updated as needed this visit by Provider  Tobacco  Allergies  Meds  Problems  Med Hx  Surg Hx  Fam Hx        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     Kidney stones 9/16/2011      Past Surgical History:   Procedure Laterality Date     EXTRACORPOREAL SHOCK WAVE LITHOTRIPSY (ESWL)  .3.4.2016     GENITOURINARY SURGERY  Kidney Stones    21 Lithotripsy procedures     HERNIA REPAIR  2017     ORTHOPEDIC SURGERY  2015    right shoulder rotor cuff     TONSILLECTOMY  2008     OB History   No obstetric history on file.       Review of Systems   Constitutional: Negative for chills and fever.   HENT: Negative for congestion, ear pain, hearing loss and sore throat.    Eyes: Negative for pain and visual disturbance.   Respiratory: Negative for cough and shortness of breath.    Cardiovascular: Positive for peripheral edema. Negative for chest pain and palpitations.   Gastrointestinal: Negative for abdominal pain, constipation, diarrhea, heartburn, hematochezia and nausea.   Genitourinary: Negative for discharge, dysuria,  "frequency, genital sores, hematuria, impotence and urgency.   Musculoskeletal: Positive for arthralgias and joint swelling. Negative for myalgias.   Skin: Negative for rash.   Neurological: Negative for dizziness, weakness, headaches and paresthesias.   Psychiatric/Behavioral: Negative for mood changes. The patient is not nervous/anxious.        OBJECTIVE:   BP (!) 155/100   Pulse 79   Temp 98.1  F (36.7  C) (Oral)   Ht 1.715 m (5' 7.5\")   Wt 83.5 kg (184 lb)   SpO2 97%   BMI 28.39 kg/m      Physical Exam  GENERAL: healthy, alert and no distress  EYES: Eyes grossly normal to inspection, PERRL and conjunctivae and sclerae normal  HENT: ear canals and TM's normal, nose and mouth without ulcers or lesions  NECK: no adenopathy, no asymmetry, masses, or scars and thyroid normal to palpation  RESP: lungs clear to auscultation - no rales, rhonchi or wheezes  CV: regular rate and rhythm, normal S1 S2, no S3 or S4, no murmur, click or rub, no peripheral edema and peripheral pulses strong  ABDOMEN: soft, nontender, no hepatosplenomegaly, no masses and bowel sounds normal  MS: right knee swelling   SKIN: no suspicious lesions or rashes  NEURO: Normal strength and tone, mentation intact and speech normal  PSYCH: mentation appears normal, affect normal/bright    Labs reviewed in Epic    ASSESSMENT/PLAN:   1. Routine general medical examination at a health care facility  Patient is a 49-year-old male with history of hypertension, congenital abscess of one kidney, right knee meniscus tear status post surgical repair, present for annual exam.  He is complaining of right knee pain with slight swelling.  He is following with orthopedic surgery for that, next appointment is on 10/28/2019..  He is also following with nephrology and his next appointment with them is on December 3  His most recent lab are grossly unremarkable.  Overall patient is doing well.  2. Hypertension goal BP (blood pressure) < 140/90  Patient with history " "of hypertension.  He is currently on Toprol XL 50 mg.  Blood pressure is not well controlled today.  Patient history significant for congenital absence of one kidney.  Initial blood pressure was 179/119.  Repeat blood pressure 155 400.  Patient is asymptomatic.  Discussed with patient diagnosis and treatment options.  I am going to add Norvasc 5 mg to Toprol, return to the clinic for ancillary blood pressure check.  If blood pressure is not well controlled we will go up on Norvasc to 10 mg.  - amLODIPine (NORVASC) 5 MG tablet; Take 1 tablet (5 mg) by mouth daily  Dispense: 30 tablet; Refill: 3    COUNSELING:   Reviewed preventive health counseling, as reflected in patient instructions       Regular exercise       Healthy diet/nutrition    Estimated body mass index is 28.39 kg/m  as calculated from the following:    Height as of this encounter: 1.715 m (5' 7.5\").    Weight as of this encounter: 83.5 kg (184 lb).     Weight management plan: Discussed healthy diet and exercise guidelines     reports that he has never smoked. He has quit using smokeless tobacco.      Counseling Resources:  ATP IV Guidelines  Pooled Cohorts Equation Calculator  FRAX Risk Assessment  ICSI Preventive Guidelines  Dietary Guidelines for Americans, 2010  USDA's MyPlate  ASA Prophylaxis  Lung CA Screening      "

## 2019-10-22 ENCOUNTER — OFFICE VISIT (OUTPATIENT)
Dept: FAMILY MEDICINE | Facility: CLINIC | Age: 49
End: 2019-10-22
Payer: COMMERCIAL

## 2019-10-22 VITALS
SYSTOLIC BLOOD PRESSURE: 155 MMHG | DIASTOLIC BLOOD PRESSURE: 100 MMHG | HEART RATE: 79 BPM | OXYGEN SATURATION: 97 % | WEIGHT: 184 LBS | BODY MASS INDEX: 27.89 KG/M2 | TEMPERATURE: 98.1 F | HEIGHT: 68 IN

## 2019-10-22 DIAGNOSIS — I10 HYPERTENSION GOAL BP (BLOOD PRESSURE) < 140/90: ICD-10-CM

## 2019-10-22 DIAGNOSIS — Z00.00 ROUTINE GENERAL MEDICAL EXAMINATION AT A HEALTH CARE FACILITY: Primary | ICD-10-CM

## 2019-10-22 DIAGNOSIS — I10 ESSENTIAL HYPERTENSION: ICD-10-CM

## 2019-10-22 PROCEDURE — 99213 OFFICE O/P EST LOW 20 MIN: CPT | Mod: 25 | Performed by: FAMILY MEDICINE

## 2019-10-22 PROCEDURE — 99396 PREV VISIT EST AGE 40-64: CPT | Performed by: FAMILY MEDICINE

## 2019-10-22 RX ORDER — AMLODIPINE BESYLATE 5 MG/1
5 TABLET ORAL DAILY
Qty: 30 TABLET | Refills: 3 | Status: SHIPPED | OUTPATIENT
Start: 2019-10-22 | End: 2020-02-04

## 2019-10-22 ASSESSMENT — ENCOUNTER SYMPTOMS
HEMATOCHEZIA: 0
JOINT SWELLING: 1
CHILLS: 0
SORE THROAT: 0
COUGH: 0
PARESTHESIAS: 0
HEADACHES: 0
WEAKNESS: 0
MYALGIAS: 0
DIZZINESS: 0
HEMATURIA: 0
DYSURIA: 0
EYE PAIN: 0
HEARTBURN: 0
ABDOMINAL PAIN: 0
NAUSEA: 0
DIARRHEA: 0
SHORTNESS OF BREATH: 0
FREQUENCY: 0
FEVER: 0
NERVOUS/ANXIOUS: 0
ARTHRALGIAS: 1
PALPITATIONS: 0
CONSTIPATION: 0

## 2019-10-22 ASSESSMENT — MIFFLIN-ST. JEOR: SCORE: 1656.18

## 2019-11-05 ENCOUNTER — ALLIED HEALTH/NURSE VISIT (OUTPATIENT)
Dept: NURSING | Facility: CLINIC | Age: 49
End: 2019-11-05
Payer: COMMERCIAL

## 2019-11-05 VITALS — SYSTOLIC BLOOD PRESSURE: 138 MMHG | DIASTOLIC BLOOD PRESSURE: 76 MMHG | HEART RATE: 70 BPM

## 2019-11-05 DIAGNOSIS — I10 HYPERTENSION GOAL BP (BLOOD PRESSURE) < 140/90: Primary | ICD-10-CM

## 2019-11-05 PROCEDURE — 99207 ZZC NO CHARGE NURSE ONLY: CPT

## 2019-11-05 NOTE — Clinical Note
Marcus Delacruz is a 49 year old patient who comes in today for a Blood Pressure check.Initial BP:  BP (!) 144/91   Pulse 70    2nd check manually: 138/76Disposition: results routed to provider

## 2019-11-05 NOTE — PROGRESS NOTES
Marcus Delacruz is a 49 year old patient who comes in today for a Blood Pressure check.  Initial BP:  BP (!) 144/91   Pulse 70      2nd check manually: 138/76  Disposition: results routed to provider    Patient declined flu shot and adacel injections.  Caroline FINNEY CMA

## 2019-11-15 ENCOUNTER — TRANSFERRED RECORDS (OUTPATIENT)
Dept: HEALTH INFORMATION MANAGEMENT | Facility: CLINIC | Age: 49
End: 2019-11-15

## 2019-11-29 DIAGNOSIS — Z87.442 PERSONAL HISTORY OF RENAL CALCULI: Primary | ICD-10-CM

## 2019-11-29 DIAGNOSIS — Q60.0 UNILATERAL AGENESIS OF KIDNEY: ICD-10-CM

## 2019-11-29 DIAGNOSIS — I10 HYPERTENSIVE DISORDER: ICD-10-CM

## 2019-12-01 PROCEDURE — 84300 ASSAY OF URINE SODIUM: CPT | Mod: 90 | Performed by: INTERNAL MEDICINE

## 2019-12-01 PROCEDURE — 82507 ASSAY OF CITRATE: CPT | Mod: 90 | Performed by: INTERNAL MEDICINE

## 2019-12-01 PROCEDURE — 36415 COLL VENOUS BLD VENIPUNCTURE: CPT | Performed by: INTERNAL MEDICINE

## 2019-12-01 PROCEDURE — 82140 ASSAY OF AMMONIA: CPT | Mod: 90 | Performed by: INTERNAL MEDICINE

## 2019-12-01 PROCEDURE — 84392 ASSAY OF URINE SULFATE: CPT | Mod: 90 | Performed by: INTERNAL MEDICINE

## 2019-12-01 PROCEDURE — 80053 COMPREHEN METABOLIC PANEL: CPT | Performed by: INTERNAL MEDICINE

## 2019-12-01 PROCEDURE — 84560 ASSAY OF URINE/URIC ACID: CPT | Mod: 90 | Performed by: INTERNAL MEDICINE

## 2019-12-01 PROCEDURE — 82570 ASSAY OF URINE CREATININE: CPT | Mod: 90 | Performed by: INTERNAL MEDICINE

## 2019-12-01 PROCEDURE — 84133 ASSAY OF URINE POTASSIUM: CPT | Mod: 90 | Performed by: INTERNAL MEDICINE

## 2019-12-01 PROCEDURE — 83986 ASSAY PH BODY FLUID NOS: CPT | Mod: 90 | Performed by: INTERNAL MEDICINE

## 2019-12-01 PROCEDURE — 84540 ASSAY OF URINE/UREA-N: CPT | Mod: 90 | Performed by: INTERNAL MEDICINE

## 2019-12-01 PROCEDURE — 84105 ASSAY OF URINE PHOSPHORUS: CPT | Mod: 90 | Performed by: INTERNAL MEDICINE

## 2019-12-01 PROCEDURE — 99000 SPECIMEN HANDLING OFFICE-LAB: CPT | Performed by: INTERNAL MEDICINE

## 2019-12-01 PROCEDURE — 82340 ASSAY OF CALCIUM IN URINE: CPT | Mod: 90 | Performed by: INTERNAL MEDICINE

## 2019-12-01 PROCEDURE — 82436 ASSAY OF URINE CHLORIDE: CPT | Mod: 90 | Performed by: INTERNAL MEDICINE

## 2019-12-01 PROCEDURE — 83935 ASSAY OF URINE OSMOLALITY: CPT | Mod: 90 | Performed by: INTERNAL MEDICINE

## 2019-12-01 PROCEDURE — 83735 ASSAY OF MAGNESIUM: CPT | Mod: 90 | Performed by: INTERNAL MEDICINE

## 2019-12-01 PROCEDURE — 83945 ASSAY OF OXALATE: CPT | Mod: 90 | Performed by: INTERNAL MEDICINE

## 2019-12-02 DIAGNOSIS — Q60.0 UNILATERAL AGENESIS OF KIDNEY: ICD-10-CM

## 2019-12-02 DIAGNOSIS — I10 HYPERTENSIVE DISORDER: ICD-10-CM

## 2019-12-02 DIAGNOSIS — Z87.442 PERSONAL HISTORY OF RENAL CALCULI: ICD-10-CM

## 2019-12-02 LAB
ALBUMIN SERPL-MCNC: 4.2 G/DL (ref 3.4–5)
ALP SERPL-CCNC: 74 U/L (ref 40–150)
ALT SERPL W P-5'-P-CCNC: 57 U/L (ref 0–70)
ANION GAP SERPL CALCULATED.3IONS-SCNC: 8 MMOL/L (ref 3–14)
AST SERPL W P-5'-P-CCNC: 33 U/L (ref 0–45)
BILIRUB SERPL-MCNC: 0.8 MG/DL (ref 0.2–1.3)
BUN SERPL-MCNC: 13 MG/DL (ref 7–30)
CALCIUM SERPL-MCNC: 9.7 MG/DL (ref 8.5–10.1)
CHLORIDE SERPL-SCNC: 106 MMOL/L (ref 94–109)
CO2 SERPL-SCNC: 26 MMOL/L (ref 20–32)
CREAT SERPL-MCNC: 0.97 MG/DL (ref 0.66–1.25)
GFR SERPL CREATININE-BSD FRML MDRD: >90 ML/MIN/{1.73_M2}
GLUCOSE SERPL-MCNC: 124 MG/DL (ref 70–99)
POTASSIUM SERPL-SCNC: 3.8 MMOL/L (ref 3.4–5.3)
PROT SERPL-MCNC: 7.3 G/DL (ref 6.8–8.8)
SODIUM SERPL-SCNC: 140 MMOL/L (ref 133–144)

## 2019-12-05 LAB
CA H2 PHOS DIHYD 24H SATFR UR: 0.15 DG
CHLORIDE 24H UR-SRATE: 109 MMOL/24 H (ref 40–224)
CITRATE 24H UR-SRATE: 184 MG/24 H (ref 370–1191)
CLINICAL BIOCHEMIST REVIEW: ABNORMAL
COLLECT DURATION TIME UR: 0.58 DG
COLLECT DURATION TIME UR: 24 H
CREAT 24H UR-MRATE: 1892 MG/24 H
OSMOLALITY UR: 327 MOSM/KG (ref 150–1150)
OXALATE 24H UR-MRATE: 28.2 MG/24 H (ref 9.7–40.5)
OXALATE 24H UR-SRATE: 0.32 MMOL/24 H (ref 0.11–0.46)
PH 24H UR: 5.9 [PH] (ref 4.5–8)
PHOSPHATE 24H UR-MRATE: 1140 MG/24 H
POTASSIUM 24H UR-SRATE: 61 MMOL/24 H (ref 17–77)
SODIUM 24H UR-SRATE: 129 MMOL/24 H (ref 41–227)
SPECIMEN VOL 24H UR: 2425 ML
SULFATE 24H UR-SRATE: 16 MMOL/24 H (ref 7–47)
SUPERSAT 24 AMMONIUM 24 HR UR: 24 MMOL/24 H (ref 15–56)
SUPERSAT 24 CALCIUM OXALATE CRYSTAL: 1.75 DG
SUPERSAT 24 CALCIUM URINE: 267 MG/24 H
SUPERSAT 24 HYDROXYAPATITE CRYSTAL: 3.87 DG
SUPERSAT 24 MAGNESIUM URINE: 170 MG/24 H (ref 51–269)
SUPERSAT 24 PROTEIN CATABOLIC RATE UR: 89 G/24 H (ref 56–125)
SUPERSAT 24 UREA NITROGEN UR: 10.3 G/24 H (ref 5–16)
URATE 24H SATFR UR: 1.04 DG
URATE 24H UR-MRATE: 752 MG/24 H

## 2020-01-03 DIAGNOSIS — I10 HYPERTENSION GOAL BP (BLOOD PRESSURE) < 140/90: ICD-10-CM

## 2020-01-03 RX ORDER — METOPROLOL SUCCINATE 50 MG/1
TABLET, EXTENDED RELEASE ORAL
Qty: 90 TABLET | Refills: 2 | Status: SHIPPED | OUTPATIENT
Start: 2020-01-03 | End: 2020-11-05

## 2020-02-03 ENCOUNTER — TELEPHONE (OUTPATIENT)
Dept: FAMILY MEDICINE | Facility: CLINIC | Age: 50
End: 2020-02-03

## 2020-02-03 DIAGNOSIS — I10 ESSENTIAL HYPERTENSION: ICD-10-CM

## 2020-02-03 NOTE — PATIENT INSTRUCTIONS
-Narcotic medications can be addicting and therefore are used for short term pain control only.  They are not intended for long-term use.    -Take them only for severe pain as needed.    -Never mix with alcohol.    -Do not drive after taking this medication.    -No refills without an office visit. No replacements will be given.    -Keep these medications kept in a safe location.  You are responsible for them.  -Do not give them to anyone else.  They are prescribed to you only.     Before Your Surgery      Call your surgeon if there is any change in your health. This includes signs of a cold or flu (such as a sore throat, runny nose, cough, rash or fever).    Do not smoke, drink alcohol or take over the counter medicine (unless your surgeon or primary care doctor tells you to) for the 24 hours before and after surgery.    If you take prescribed drugs: Follow your doctor s orders about which medicines to take and which to stop until after surgery.    Eating and drinking prior to surgery: follow the instructions from your surgeon    Take a shower or bath the night before surgery. Use the soap your surgeon gave you to gently clean your skin. If you do not have soap from your surgeon, use your regular soap. Do not shave or scrub the surgery site.  Wear clean pajamas and have clean sheets on your bed.

## 2020-02-03 NOTE — PROGRESS NOTES
"  [facility]  [id number] [facility] [id number] [phone]  Dept: [phone]    PRE-OP EVALUATION:  Today's date: 2020    Marcus Delacruz (: 1970) presents for pre-operative evaluation assessment as requested by Dr. Medina.  He requires evaluation and anesthesia risk assessment prior to undergoing surgery/procedure for treatment of  R Knee.    [place] in past year. Different providers but some from the same surgeon, he had knee surgery once last year. We discussed going forward he needs to try to stick with one provider as much as possible. He states he doesn't plan on staying on pain medications after he heals from his surgery. He has h/o kidney stones but hasnt had a flare in the past year.     Dr. Mejia has been his primary but he \"can't get in to see him\".       Colon cancer was completed 2019 @ [facility], results were normal. Will abstract data into charts.    Fax number for surgical facility: [phone]  Primary Physician: Rj Mejia  Type of Anesthesia Anticipated: to be determined    Patient has a Health Care Directive or Living Will:  NO    Preop Questions 2020   Who is doing your surgery? [facility]   What are you having done? Knee Replacement   Date of Surgery/Procedure: March 10 2020   Facility or Hospital where procedure/surgery will be performed: [facility]   1.  Do you have a history of Heart attack, stroke, stent, coronary bypass surgery, or other heart surgery? No   2.  Do you ever have any pain or discomfort in your chest? No   3.  Do you have a history of  Heart Failure? No   4.   Are you troubled by shortness of breath when:  walking on a level surface, or up a slight hill, or at night? No   5.  Do you currently have a cold, bronchitis or other respiratory infection? No   6.  Do you have a cough, shortness of breath, or wheezing? No   7.  Do you " sometimes get pains in the calves of your legs when you walk? No   8. Do you or anyone in your family have previous history of blood clots? No   9.  Do you or does anyone in your family have a serious bleeding problem such as prolonged bleeding following surgeries or cuts? No   10. Have you ever had problems with anemia or been told to take iron pills? No   11. Have you had any abnormal blood loss such as black, tarry or bloody stools? No   12. Have you ever had a blood transfusion? No   13. Have you or any of your relatives ever had problems with anesthesia? No   14. Do you have sleep apnea, excessive snoring or daytime drowsiness? No   15. Do you have any prosthetic heart valves? No   16. Do you have prosthetic joints? No         HPI:     HPI related to upcoming procedure: R knee pain x 1 year, torn meniscus and now has no cartilage per patient so will be getting it replaced. Causes pain. Takes norco prn to sleep. Doesn't fill monthy but does fill frequently. We discussed side effects and risks of this medication today including addiction, tolerance, increased sedation, respiratory depression, and possibly overdose if not taken as directed.   They will not mix this medication with alcohol or other sedating medications. They will not drive after taking this medication.  Patient states understanding. They verbally agreed to use their medication responsibly.         HYPERTENSION - Patient has longstanding history of HTN , currently denies any symptoms referable to elevated blood pressure. Specifically denies chest pain, palpitations, dyspnea, orthopnea, PND or peripheral edema. Blood pressure readings have been in normal range. Current medication regimen is as listed below. Patient denies any side effects of medication.     Elevated creatine-not elevated enough that I think it should cause a delay in surgery, but patient should have this rechecked again in a few weeks and make sure he is hydrated the day he gets the  test done. He does have one kidney congenitally absent.  I recommend monitoring kidney function in the post-op period.             MEDICAL HISTORY:     Patient Active Problem List    Diagnosis Date Noted     Other male erectile dysfunction 11/11/2016     Priority: Medium     Hyperglycemia 11/11/2016     Priority: Medium     Special screening for malignant neoplasm of prostate 10/22/2015     Priority: Medium     Rotator cuff tear 05/12/2015     Priority: Medium     Hypertension goal BP (blood pressure) < 140/90 10/09/2014     Priority: Medium     Microhematuria 10/09/2014     Priority: Medium     Elevated blood pressure reading without diagnosis of hypertension 09/19/2012     Priority: Medium     Pain in joint, lower leg 10/20/2011     Priority: Medium     Kidney congenitally absent, 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     Priority: Medium     Kidney stones 09/16/2011     Priority: Medium     CARDIOVASCULAR SCREENING; LDL GOAL LESS THAN 160 10/31/2010     Priority: Medium      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     Kidney stones 9/16/2011     Past Surgical History:   Procedure Laterality Date     EXTRACORPOREAL SHOCK WAVE LITHOTRIPSY (ESWL)  .3.4.2016     GENITOURINARY SURGERY  Kidney Stones    21 Lithotripsy procedures     HERNIA REPAIR  2017     ORTHOPEDIC SURGERY  2015    right shoulder rotor cuff     TONSILLECTOMY  2008     Current Outpatient Medications   Medication Sig Dispense Refill     amLODIPine (NORVASC) 5 MG tablet Take 1 tablet (5 mg) by mouth daily 30 tablet 3     metoprolol succinate ER (TOPROL-XL) 50 MG 24 hr tablet TAKE ONE TABLET BY MOUTH AT BEDTIME FOR BLOOD PRESSURE 90 tablet 2     OTC products: None, except as noted above    Allergies   Allergen Reactions     Cephalosporins Anaphylaxis     Severe reaction. anaphylaxis     Azo [Phenazopyridine] Hives     Flomax [Tamsulosin] Hives     Levaquin [Levofloxacin  "Hemihydrate] Hives     Amoxicillin Hives     Detrol [Tolterodine Tartrate] Hives      Latex Allergy: NO    Social History     Tobacco Use     Smoking status: Never Smoker     Smokeless tobacco: Former User     Tobacco comment: NON SMOKING HOME   Substance Use Topics     Alcohol use: Yes     History   Drug Use No       REVIEW OF SYSTEMS:   Constitutional, neuro, ENT, endocrine, pulmonary, cardiac, gastrointestinal, genitourinary, musculoskeletal, integument and psychiatric systems are negative, except as otherwise noted.    EXAM:   /80   Pulse 80   Temp 98.2  F (36.8  C) (Oral)   Resp 14   Ht 1.715 m (5' 7.5\")   Wt 86.6 kg (191 lb)   SpO2 98%   BMI 29.47 kg/m      GENERAL APPEARANCE: healthy, alert and no distress     EYES: EOMI,  PERRL     HENT: ear canals and TM's normal and nose and mouth without ulcers or lesions     NECK: no adenopathy, no asymmetry, masses, or scars and thyroid normal to palpation     RESP: lungs clear to auscultation - no rales, rhonchi or wheezes     CV: regular rates and rhythm, normal S1 S2, no S3 or S4 and no murmur, click or rub     MS: extremities normal- no gross deformities noted, no evidence of inflammation in joints, FROM in all extremities.     SKIN: no suspicious lesions or rashes     NEURO: Normal strength and tone, sensory exam grossly normal, mentation intact and speech normal     PSYCH: mentation appears normal. and affect normal/bright     LYMPHATICS: No cervical adenopathy    DIAGNOSTICS:   EKG: appears normal, NSR, normal axis, normal intervals, no acute ST/T changes c/w ischemia, no LVH by voltage criteria, unchanged from previous tracings    Recent Labs   Lab Test 12/02/19  1324 10/17/19  0847 05/09/19  0929  11/08/18  0906  05/19/17  0927   HGB  --   --  15.2  --  15.0  --  15.8   PLT  --   --   --   --  214  --  206    143  --    < > 140   < >  --    POTASSIUM 3.8 4.6  --    < > 4.6   < >  --    CR 0.97 0.92  --    < > 1.21   < >  --     < > = values " in this interval not displayed.      Results for orders placed or performed in visit on 02/11/20   CBC with platelets differential     Status: None   Result Value Ref Range    WBC 7.9 4.0 - 11.0 10e9/L    RBC Count 5.44 4.4 - 5.9 10e12/L    Hemoglobin 15.5 13.3 - 17.7 g/dL    Hematocrit 46.5 40.0 - 53.0 %    MCV 86 78 - 100 fl    MCH 28.5 26.5 - 33.0 pg    MCHC 33.3 31.5 - 36.5 g/dL    RDW 12.9 10.0 - 15.0 %    Platelet Count 222 150 - 450 10e9/L    % Neutrophils 57.3 %    % Lymphocytes 29.2 %    % Monocytes 9.5 %    % Eosinophils 2.9 %    % Basophils 1.1 %    Absolute Neutrophil 4.5 1.6 - 8.3 10e9/L    Absolute Lymphocytes 2.3 0.8 - 5.3 10e9/L    Absolute Monocytes 0.8 0.0 - 1.3 10e9/L    Absolute Eosinophils 0.2 0.0 - 0.7 10e9/L    Absolute Basophils 0.1 0.0 - 0.2 10e9/L    Diff Method Automated Method    Basic metabolic panel     Status: Abnormal   Result Value Ref Range    Sodium 136 133 - 144 mmol/L    Potassium 3.7 3.4 - 5.3 mmol/L    Chloride 103 94 - 109 mmol/L    Carbon Dioxide 27 20 - 32 mmol/L    Anion Gap 6 3 - 14 mmol/L    Glucose 116 (H) 70 - 99 mg/dL    Urea Nitrogen 25 7 - 30 mg/dL    Creatinine 1.66 (H) 0.66 - 1.25 mg/dL    GFR Estimate 47 (L) >60 mL/min/[1.73_m2]    GFR Estimate If Black 54 (L) >60 mL/min/[1.73_m2]    Calcium 9.7 8.5 - 10.1 mg/dL       IMPRESSION:   Reason for surgery/procedure: knee pain/arthritis  Diagnosis/reason for consult: knee replacement    The proposed surgical procedure is considered INTERMEDIATE risk.    REVISED CARDIAC RISK INDEX  The patient has the following serious cardiovascular risks for perioperative complications such as (MI, PE, VFib and 3  AV Block):  No serious cardiac risks  INTERPRETATION: 0 risks: Class I (very low risk - 0.4% complication rate)    The patient has the following additional risks for perioperative complications:  No identified additional risks      ICD-10-CM    1. Preop general physical exam Z01.818    2. Screen for colon cancer Z12.11 Fecal  colorectal cancer screen FIT - Future (S+30)       RECOMMENDATIONS:     --Consult hospital rounder / IM to assist post-op medical management    --Patient is to take all scheduled medications on the day of surgery EXCEPT for modifications listed below.    APPROVAL GIVEN to proceed with proposed procedure, without further diagnostic evaluation       Signed Electronically by: Marya Maki PA-C  Agreed with above. Derrek Roberts M.D.      Copy of this evaluation report is provided to requesting physician.    Patient Instructions   -Narcotic medications can be addicting and therefore are used for short term pain control only.  They are not intended for long-term use.    -Take them only for severe pain as needed.    -Never mix with alcohol.    -Do not drive after taking this medication.    -No refills without an office visit. No replacements will be given.    -Keep these medications kept in a safe location.  You are responsible for them.  -Do not give them to anyone else.  They are prescribed to you only.     Before Your Surgery      Call your surgeon if there is any change in your health. This includes signs of a cold or flu (such as a sore throat, runny nose, cough, rash or fever).    Do not smoke, drink alcohol or take over the counter medicine (unless your surgeon or primary care doctor tells you to) for the 24 hours before and after surgery.    If you take prescribed drugs: Follow your doctor s orders about which medicines to take and which to stop until after surgery.    Eating and drinking prior to surgery: follow the instructions from your surgeon    Take a shower or bath the night before surgery. Use the soap your surgeon gave you to gently clean your skin. If you do not have soap from your surgeon, use your regular soap. Do not shave or scrub the surgery site.  Wear clean pajamas and have clean sheets on your bed.       [place] Preop Guidelines    Revised Cardiac Risk Index

## 2020-02-04 RX ORDER — AMLODIPINE BESYLATE 5 MG/1
5 TABLET ORAL DAILY
Qty: 90 TABLET | Refills: 0 | Status: SHIPPED | OUTPATIENT
Start: 2020-02-04 | End: 2020-04-15

## 2020-02-11 ENCOUNTER — OFFICE VISIT (OUTPATIENT)
Dept: FAMILY MEDICINE | Facility: CLINIC | Age: 50
End: 2020-02-11
Payer: COMMERCIAL

## 2020-02-11 VITALS
BODY MASS INDEX: 28.95 KG/M2 | OXYGEN SATURATION: 98 % | DIASTOLIC BLOOD PRESSURE: 80 MMHG | WEIGHT: 191 LBS | TEMPERATURE: 98.2 F | SYSTOLIC BLOOD PRESSURE: 126 MMHG | HEIGHT: 68 IN | HEART RATE: 80 BPM | RESPIRATION RATE: 14 BRPM

## 2020-02-11 DIAGNOSIS — G89.29 CHRONIC PAIN OF RIGHT KNEE: ICD-10-CM

## 2020-02-11 DIAGNOSIS — M25.561 CHRONIC PAIN OF RIGHT KNEE: ICD-10-CM

## 2020-02-11 DIAGNOSIS — Z01.818 PREOP GENERAL PHYSICAL EXAM: Primary | ICD-10-CM

## 2020-02-11 LAB
ANION GAP SERPL CALCULATED.3IONS-SCNC: 6 MMOL/L (ref 3–14)
BASOPHILS # BLD AUTO: 0.1 10E9/L (ref 0–0.2)
BASOPHILS NFR BLD AUTO: 1.1 %
BUN SERPL-MCNC: 25 MG/DL (ref 7–30)
CALCIUM SERPL-MCNC: 9.7 MG/DL (ref 8.5–10.1)
CHLORIDE SERPL-SCNC: 103 MMOL/L (ref 94–109)
CO2 SERPL-SCNC: 27 MMOL/L (ref 20–32)
CREAT SERPL-MCNC: 1.66 MG/DL (ref 0.66–1.25)
DIFFERENTIAL METHOD BLD: NORMAL
EOSINOPHIL # BLD AUTO: 0.2 10E9/L (ref 0–0.7)
EOSINOPHIL NFR BLD AUTO: 2.9 %
ERYTHROCYTE [DISTWIDTH] IN BLOOD BY AUTOMATED COUNT: 12.9 % (ref 10–15)
GFR SERPL CREATININE-BSD FRML MDRD: 47 ML/MIN/{1.73_M2}
GLUCOSE SERPL-MCNC: 116 MG/DL (ref 70–99)
HCT VFR BLD AUTO: 46.5 % (ref 40–53)
HGB BLD-MCNC: 15.5 G/DL (ref 13.3–17.7)
LYMPHOCYTES # BLD AUTO: 2.3 10E9/L (ref 0.8–5.3)
LYMPHOCYTES NFR BLD AUTO: 29.2 %
MCH RBC QN AUTO: 28.5 PG (ref 26.5–33)
MCHC RBC AUTO-ENTMCNC: 33.3 G/DL (ref 31.5–36.5)
MCV RBC AUTO: 86 FL (ref 78–100)
MONOCYTES # BLD AUTO: 0.8 10E9/L (ref 0–1.3)
MONOCYTES NFR BLD AUTO: 9.5 %
NEUTROPHILS # BLD AUTO: 4.5 10E9/L (ref 1.6–8.3)
NEUTROPHILS NFR BLD AUTO: 57.3 %
PLATELET # BLD AUTO: 222 10E9/L (ref 150–450)
POTASSIUM SERPL-SCNC: 3.7 MMOL/L (ref 3.4–5.3)
RBC # BLD AUTO: 5.44 10E12/L (ref 4.4–5.9)
SODIUM SERPL-SCNC: 136 MMOL/L (ref 133–144)
WBC # BLD AUTO: 7.9 10E9/L (ref 4–11)

## 2020-02-11 PROCEDURE — 99214 OFFICE O/P EST MOD 30 MIN: CPT | Performed by: PHYSICIAN ASSISTANT

## 2020-02-11 PROCEDURE — 80048 BASIC METABOLIC PNL TOTAL CA: CPT | Performed by: PHYSICIAN ASSISTANT

## 2020-02-11 PROCEDURE — 36415 COLL VENOUS BLD VENIPUNCTURE: CPT | Performed by: PHYSICIAN ASSISTANT

## 2020-02-11 PROCEDURE — 85025 COMPLETE CBC W/AUTO DIFF WBC: CPT | Performed by: PHYSICIAN ASSISTANT

## 2020-02-11 PROCEDURE — 93000 ELECTROCARDIOGRAM COMPLETE: CPT | Performed by: PHYSICIAN ASSISTANT

## 2020-02-11 RX ORDER — OXYCODONE AND ACETAMINOPHEN 5; 325 MG/1; MG/1
1 TABLET ORAL EVERY 6 HOURS PRN
Qty: 30 TABLET | Refills: 0 | Status: SHIPPED | OUTPATIENT
Start: 2020-02-11 | End: 2020-02-14

## 2020-02-11 ASSESSMENT — MIFFLIN-ST. JEOR: SCORE: 1687.93

## 2020-02-11 NOTE — Clinical Note
Please abstract the following data from this visit with this patient into the appropriate field in Epic:Tests that can be patient reported without a hard copy:Colonoscopy done on this date: 01/01/2019 (approximately), by this group: [facility], results were normal. Other Tests found in the patient's chart through Chart Review/Care Everywhere:Note to Abstraction: If this section is blank, no results were found via Chart Review/Care Everywhere.

## 2020-02-13 ENCOUNTER — TELEPHONE (OUTPATIENT)
Dept: FAMILY MEDICINE | Facility: CLINIC | Age: 50
End: 2020-02-13

## 2020-02-13 NOTE — PROGRESS NOTES
Faxed pre op, labs and EKG to [facility] @ [phone].Samreen Garner MA/DIOMEDES

## 2020-02-13 NOTE — RESULT ENCOUNTER NOTE
PLEASE CALL PATIENT:  Dear Marcus,      It was a pleasure to see you at your recent office visit.  Your test results are listed below.  Blood sugar was normal as you were not fasting. Kidney function is decreased from previous. I would recommend you retest this with your primary care doctor in about 3-4 weeks and make sure you are hydrated/drink plenty of water that day. This shouldn't interfere with surgery but they should monitor your kidney function post-operatively. White and red blood cell counts normal.         If you have any questions or concerns, please call the clinic at [phone].    Sincerely,  Marya Maki PA-C

## 2020-02-13 NOTE — TELEPHONE ENCOUNTER
Cell number vm is full  Left message on home answering machine for patient to call back to [phone].  Jazlyn PATELN, RN

## 2020-02-13 NOTE — TELEPHONE ENCOUNTER
PLEASE CALL PATIENT:   Dear Marcus,       It was a pleasure to see you at your recent office visit.  Your test results are listed below.  Blood sugar was normal as you were not fasting. Kidney function is decreased from previous. I would recommend you retest this with your primary care doctor in about 3-4 weeks and make sure you are hydrated/drink plenty of water that day. This shouldn't interfere with surgery but they should monitor your kidney function post-operatively. White and red blood cell counts normal.         If you have any questions or concerns, please call the clinic at [phone].     Sincerely,   Marya Maki PA-C

## 2020-02-13 NOTE — TELEPHONE ENCOUNTER
Pt notified of provider message as written.  Pt verbalized good understanding.  Jazlyn Borrego BSN, RN

## 2020-03-25 ENCOUNTER — TRANSFERRED RECORDS (OUTPATIENT)
Dept: HEALTH INFORMATION MANAGEMENT | Facility: CLINIC | Age: 50
End: 2020-03-25

## 2020-04-14 DIAGNOSIS — I10 ESSENTIAL HYPERTENSION: ICD-10-CM

## 2020-04-15 RX ORDER — AMLODIPINE BESYLATE 5 MG/1
5 TABLET ORAL DAILY
Qty: 90 TABLET | Refills: 0 | Status: SHIPPED | OUTPATIENT
Start: 2020-04-15 | End: 2020-07-27

## 2020-04-15 NOTE — TELEPHONE ENCOUNTER
Norvasc refill request  Last refill 2/4/20 #90  Saw ZAHRA Maki for pre-op 2/11/20  Saw Dr. Cuevas 10/22/20.  Per OV note:  2. Hypertension goal BP (blood pressure) < 140/90  Patient with history of hypertension.  He is currently on Toprol XL 50 mg.  Blood pressure is not well controlled today.  Patient history significant for congenital absence of one kidney.  Initial blood pressure was 179/119.  Repeat blood pressure 155 400.  Patient is asymptomatic.  Discussed with patient diagnosis and treatment options.  I am going to add Norvasc 5 mg to Toprol, return to the clinic for ancillary blood pressure check.  If blood pressure is not well controlled we will go up on Norvasc to 10 mg.    BP Readings from Last 3 Encounters:   02/11/20 126/80   11/05/19 138/76   10/22/19 (!) 155/100     Per kidney doctor OV note: 12/3/19: Hypertension. BP is elevated today, but home readings are well controlled with amlodipine and metoprolol. Will continue current regimen.     Creatinine   Date Value Ref Range Status   02/11/2020 1.66 (H) 0.66 - 1.25 mg/dL Final     ABNORMAL SERUM CREATININE LEVEL>  FORWARDING TO ZAHRA MAKI FOR REFILL NEED.

## 2020-07-22 DIAGNOSIS — I10 ESSENTIAL HYPERTENSION: ICD-10-CM

## 2020-07-22 NOTE — TELEPHONE ENCOUNTER
Routing refill request to provider for review/approval because:  Labs out of range:  Cr  Jazlyn Borrego BSN, RN

## 2020-07-27 RX ORDER — AMLODIPINE BESYLATE 5 MG/1
TABLET ORAL
Qty: 90 TABLET | Refills: 3 | Status: SHIPPED | OUTPATIENT
Start: 2020-07-27 | End: 2021-08-05

## 2020-07-31 ENCOUNTER — TRANSFERRED RECORDS (OUTPATIENT)
Dept: HEALTH INFORMATION MANAGEMENT | Facility: CLINIC | Age: 50
End: 2020-07-31

## 2020-09-30 ENCOUNTER — TELEPHONE (OUTPATIENT)
Dept: FAMILY MEDICINE | Facility: CLINIC | Age: 50
End: 2020-09-30

## 2020-09-30 DIAGNOSIS — I10 HYPERTENSION GOAL BP (BLOOD PRESSURE) < 140/90: ICD-10-CM

## 2020-09-30 DIAGNOSIS — R73.9 HYPERGLYCEMIA: ICD-10-CM

## 2020-09-30 DIAGNOSIS — Z00.00 ROUTINE HISTORY AND PHYSICAL EXAMINATION OF ADULT: ICD-10-CM

## 2020-09-30 DIAGNOSIS — Z12.5 SPECIAL SCREENING FOR MALIGNANT NEOPLASM OF PROSTATE: ICD-10-CM

## 2020-09-30 DIAGNOSIS — Z13.6 CARDIOVASCULAR SCREENING; LDL GOAL LESS THAN 160: Primary | ICD-10-CM

## 2020-09-30 NOTE — TELEPHONE ENCOUNTER
Patient called back, lab appointment made.    Please review lab orders sign and close encounter. Samreen Garner MA/DIOMEDES    Physical 11/5/20

## 2020-09-30 NOTE — TELEPHONE ENCOUNTER
Patient called today.    Patient would like to request abs 1 week before physical appointment on November 5, 2020 with Dr. Mejia at [facility].    Please contact patient.    Thank you.    Central Scheduling  Yesi ALVAREZ

## 2020-10-29 DIAGNOSIS — Z12.5 SPECIAL SCREENING FOR MALIGNANT NEOPLASM OF PROSTATE: ICD-10-CM

## 2020-10-29 DIAGNOSIS — R73.9 HYPERGLYCEMIA: ICD-10-CM

## 2020-10-29 DIAGNOSIS — Z13.6 CARDIOVASCULAR SCREENING; LDL GOAL LESS THAN 160: ICD-10-CM

## 2020-10-29 DIAGNOSIS — I10 HYPERTENSION GOAL BP (BLOOD PRESSURE) < 140/90: ICD-10-CM

## 2020-10-29 DIAGNOSIS — Z00.00 ROUTINE HISTORY AND PHYSICAL EXAMINATION OF ADULT: ICD-10-CM

## 2020-10-29 LAB
ALBUMIN SERPL-MCNC: 3.8 G/DL (ref 3.4–5)
ALP SERPL-CCNC: 84 U/L (ref 40–150)
ALT SERPL W P-5'-P-CCNC: 54 U/L (ref 0–70)
ANION GAP SERPL CALCULATED.3IONS-SCNC: 7 MMOL/L (ref 3–14)
AST SERPL W P-5'-P-CCNC: 26 U/L (ref 0–45)
BILIRUB SERPL-MCNC: 0.5 MG/DL (ref 0.2–1.3)
BUN SERPL-MCNC: 16 MG/DL (ref 7–30)
CALCIUM SERPL-MCNC: 9.4 MG/DL (ref 8.5–10.1)
CHLORIDE SERPL-SCNC: 104 MMOL/L (ref 94–109)
CHOLEST SERPL-MCNC: 158 MG/DL
CO2 SERPL-SCNC: 27 MMOL/L (ref 20–32)
CREAT SERPL-MCNC: 1.02 MG/DL (ref 0.66–1.25)
ERYTHROCYTE [DISTWIDTH] IN BLOOD BY AUTOMATED COUNT: 12.8 % (ref 10–15)
GFR SERPL CREATININE-BSD FRML MDRD: 84 ML/MIN/{1.73_M2}
GLUCOSE SERPL-MCNC: 106 MG/DL (ref 70–99)
HCT VFR BLD AUTO: 45.7 % (ref 40–53)
HDLC SERPL-MCNC: 45 MG/DL
HGB BLD-MCNC: 15.5 G/DL (ref 13.3–17.7)
LDLC SERPL CALC-MCNC: 87 MG/DL
MCH RBC QN AUTO: 29.9 PG (ref 26.5–33)
MCHC RBC AUTO-ENTMCNC: 33.9 G/DL (ref 31.5–36.5)
MCV RBC AUTO: 88 FL (ref 78–100)
NONHDLC SERPL-MCNC: 113 MG/DL
PLATELET # BLD AUTO: 235 10E9/L (ref 150–450)
POTASSIUM SERPL-SCNC: 4.5 MMOL/L (ref 3.4–5.3)
PROT SERPL-MCNC: 7.3 G/DL (ref 6.8–8.8)
RBC # BLD AUTO: 5.19 10E12/L (ref 4.4–5.9)
SODIUM SERPL-SCNC: 138 MMOL/L (ref 133–144)
TRIGL SERPL-MCNC: 129 MG/DL
WBC # BLD AUTO: 6.3 10E9/L (ref 4–11)

## 2020-10-29 PROCEDURE — G0103 PSA SCREENING: HCPCS | Performed by: FAMILY MEDICINE

## 2020-10-29 PROCEDURE — 80061 LIPID PANEL: CPT | Performed by: FAMILY MEDICINE

## 2020-10-29 PROCEDURE — 36415 COLL VENOUS BLD VENIPUNCTURE: CPT | Performed by: FAMILY MEDICINE

## 2020-10-29 PROCEDURE — 80053 COMPREHEN METABOLIC PANEL: CPT | Performed by: FAMILY MEDICINE

## 2020-10-29 PROCEDURE — 85027 COMPLETE CBC AUTOMATED: CPT | Performed by: FAMILY MEDICINE

## 2020-10-30 LAB — PSA SERPL-ACNC: 0.84 UG/L (ref 0–4)

## 2020-11-04 NOTE — PROGRESS NOTES
"  3  SUBJECTIVE:   CC: Marcus Delacruz is an 50 year old male who presents for preventive health visit.     {Split Bill scripting  The purpose of this visit is to discuss your medical history and prevent health problems before you are sick. You may be responsible for a co-pay, coinsurance, or deductible if your visit today includes services such as checking on a sore throat, having an x-ray or lab test, or treating and evaluating a new or existing condition :860142}  Patient has been advised of split billing requirements and indicates understanding: {Yes and No:209943}  Healthy Habits:    Do you get at least three servings of calcium containing foods daily (dairy, green leafy vegetables, etc.)? { :365919::\"yes\"}    Amount of exercise or daily activities, outside of work: { :483071}    Problems taking medications regularly { :822735::\"No\"}    Medication side effects: { :352140::\"No\"}    Have you had an eye exam in the past two years? { :004482}    Do you see a dentist twice per year? { :281593}    Do you have sleep apnea, excessive snoring or daytime drowsiness?{ :656660}  {Outside tests to abstract? :647453}    {additional problems to add (Optional):034879}    Today's PHQ-2 Score:   PHQ-2 ( 1999 Pfizer) 2/11/2020 10/22/2019   Q1: Little interest or pleasure in doing things 0 0   Q2: Feeling down, depressed or hopeless 0 0   PHQ-2 Score 0 0   Q1: Little interest or pleasure in doing things - Not at all   Q2: Feeling down, depressed or hopeless - Not at all   PHQ-2 Score - 0     {PHQ-2 LOOK IN ASSESSMENTS (Optional) :093477}  Abuse: Current or Past(Physical, Sexual or Emotional)- {YES/NO/NA:699142}  Do you feel safe in your environment? {YES/NO/NA:555766}        Social History     Tobacco Use     Smoking status: Never Smoker     Smokeless tobacco: Former User     Tobacco comment: NON SMOKING HOME   Substance Use Topics     Alcohol use: Yes     Comment: OCC     If you drink alcohol do you typically have >3 drinks per " "day or >7 drinks per week? {ETOH :377590}                      Last PSA:   PSA   Date Value Ref Range Status   10/29/2020 0.84 0 - 4 ug/L Final     Comment:     Assay Method:  Chemiluminescence using Siemens Vista analyzer       Reviewed orders with patient. Reviewed health maintenance and updated orders accordingly - {Yes/No:935815::\"Yes\"}  {Chronicprobdata (Optional):631078}    Reviewed and updated as needed this visit by clinical staff                 Reviewed and updated as needed this visit by Provider                {HISTORY OPTIONS (Optional):881883}    ROS:  { :720819::\"CONSTITUTIONAL: NEGATIVE for fever, chills, change in weight\",\"INTEGUMENTARY/SKIN: NEGATIVE for worrisome rashes, moles or lesions\",\"EYES: NEGATIVE for vision changes or irritation\",\"ENT: NEGATIVE for ear, mouth and throat problems\",\"RESP: NEGATIVE for significant cough or SOB\",\"CV: NEGATIVE for chest pain, palpitations or peripheral edema\",\"GI: NEGATIVE for nausea, abdominal pain, heartburn, or change in bowel habits\",\" male: negative for dysuria, hematuria, decreased urinary stream, erectile dysfunction, urethral discharge\",\"MUSCULOSKELETAL: NEGATIVE for significant arthralgias or myalgia\",\"NEURO: NEGATIVE for weakness, dizziness or paresthesias\",\"PSYCHIATRIC: NEGATIVE for changes in mood or affect\"}    OBJECTIVE:   There were no vitals taken for this visit.  EXAM:  {Exam Choices:762172}    {Diagnostic Test Results (Optional):022617::\"Diagnostic Test Results:\",\"Labs reviewed in Epic\"}    ASSESSMENT/PLAN:   {Diag Picklist:248556}    Patient has been advised of split billing requirements and indicates understanding: {YES / NO:812778::\"Yes\"}  COUNSELING:  {MALE COUNSELING MESSAGES:523902::\"Reviewed preventive health counseling, as reflected in patient instructions\"}    Estimated body mass index is 29.47 kg/m  as calculated from the following:    Height as of 2/11/20: 1.715 m (5' 7.5\").    Weight as of 2/11/20: 86.6 kg (191 lb).    {Weight " Management Plan (ACO) Complete if BMI is abnormal-  Ages 18-64  BMI >24.9.  Age 65+ with BMI <23 or >30 (Optional):878207}    He reports that he has never smoked. He has quit using smokeless tobacco.      Counseling Resources:  ATP IV Guidelines  Pooled Cohorts Equation Calculator  FRAX Risk Assessment  ICSI Preventive Guidelines  Dietary Guidelines for Americans, 2010  USDA's MyPlate  ASA Prophylaxis  Lung CA Screening    Rj Mejia MD  [facility]

## 2020-11-05 ENCOUNTER — OFFICE VISIT (OUTPATIENT)
Dept: FAMILY MEDICINE | Facility: CLINIC | Age: 50
End: 2020-11-05
Payer: COMMERCIAL

## 2020-11-05 VITALS
OXYGEN SATURATION: 97 % | WEIGHT: 185 LBS | HEIGHT: 68 IN | DIASTOLIC BLOOD PRESSURE: 82 MMHG | TEMPERATURE: 99.2 F | RESPIRATION RATE: 16 BRPM | HEART RATE: 72 BPM | SYSTOLIC BLOOD PRESSURE: 126 MMHG | BODY MASS INDEX: 28.04 KG/M2

## 2020-11-05 DIAGNOSIS — Z00.00 ROUTINE GENERAL MEDICAL EXAMINATION AT A HEALTH CARE FACILITY: Primary | ICD-10-CM

## 2020-11-05 DIAGNOSIS — I10 HYPERTENSION GOAL BP (BLOOD PRESSURE) < 140/90: ICD-10-CM

## 2020-11-05 PROCEDURE — 99396 PREV VISIT EST AGE 40-64: CPT | Performed by: FAMILY MEDICINE

## 2020-11-05 RX ORDER — METOPROLOL SUCCINATE 50 MG/1
TABLET, EXTENDED RELEASE ORAL
Qty: 90 TABLET | Refills: 3 | Status: SHIPPED | OUTPATIENT
Start: 2020-11-05 | End: 2020-12-21

## 2020-11-05 ASSESSMENT — ENCOUNTER SYMPTOMS
HEMATURIA: 0
PALPITATIONS: 0
WEAKNESS: 0
CHILLS: 0
HEADACHES: 0
CONSTIPATION: 0
DIZZINESS: 0
DIARRHEA: 0
PARESTHESIAS: 0
MYALGIAS: 0
FEVER: 0
NERVOUS/ANXIOUS: 0
ABDOMINAL PAIN: 0
ARTHRALGIAS: 0
FREQUENCY: 0
SORE THROAT: 0
HEMATOCHEZIA: 0
HEARTBURN: 0
COUGH: 0
JOINT SWELLING: 0
EYE PAIN: 0
SHORTNESS OF BREATH: 0
NAUSEA: 0
DYSURIA: 0

## 2020-11-05 ASSESSMENT — MIFFLIN-ST. JEOR: SCORE: 1655.71

## 2020-11-05 NOTE — NURSING NOTE
"Chief Complaint   Patient presents with     Physical       Initial /82   Pulse 72   Temp 99.2  F (37.3  C) (Oral)   Resp 16   Ht 1.715 m (5' 7.5\")   Wt 83.9 kg (185 lb)   SpO2 97%   BMI 28.55 kg/m   Estimated body mass index is 28.55 kg/m  as calculated from the following:    Height as of this encounter: 1.715 m (5' 7.5\").    Weight as of this encounter: 83.9 kg (185 lb).  Medication Reconciliation: complete  Davion Alonso CMA    "

## 2020-11-05 NOTE — PROGRESS NOTES
SUBJECTIVE:   CC: Marcus Delacruz is an 50 year old male who presents for preventative health visit.     Home/work. 2 kids 16,10yo.   Work ok - not from home. Outside blood pressure reading ok. Exercise regularly.   Patient has been advised of split billing requirements and indicates understanding: Yes  Healthy Habits:     Getting at least 3 servings of Calcium per day:  Yes    Bi-annual eye exam:  Yes    Dental care twice a year:  Yes    Sleep apnea or symptoms of sleep apnea:  None    Diet:  Regular (no restrictions)    Frequency of exercise:  4-5 days/week    Duration of exercise:  15-30 minutes    Taking medications regularly:  Yes    Medication side effects:  None    PHQ-2 Total Score: 0    Additional concerns today:  No      Today's PHQ-2 Score:   PHQ-2 ( 1999 Pfizer) 11/5/2020   Q1: Little interest or pleasure in doing things 0   Q2: Feeling down, depressed or hopeless 0   PHQ-2 Score 0   Q1: Little interest or pleasure in doing things Not at all   Q2: Feeling down, depressed or hopeless Not at all   PHQ-2 Score 0       Abuse: Current or Past(Physical, Sexual or Emotional)- No  Do you feel safe in your environment? Yes        Social History     Tobacco Use     Smoking status: Never Smoker     Smokeless tobacco: Former User     Tobacco comment: NON SMOKING HOME   Substance Use Topics     Alcohol use: Yes     Comment: OCC         Alcohol Use 11/5/2020   Prescreen: >3 drinks/day or >7 drinks/week? No   Prescreen: >3 drinks/day or >7 drinks/week? -       Last PSA:   PSA   Date Value Ref Range Status   10/29/2020 0.84 0 - 4 ug/L Final     Comment:     Assay Method:  Chemiluminescence using Siemens Vista analyzer       Reviewed orders with patient. Reviewed health maintenance and updated orders accordingly - Yes  Labs reviewed in EPIC    Reviewed and updated as needed this visit by clinical staff                 Reviewed and updated as needed this visit by Provider                    Review of Systems  "  Constitutional: Negative for chills and fever.   HENT: Negative for congestion, ear pain, hearing loss and sore throat.    Eyes: Negative for pain and visual disturbance.   Respiratory: Negative for cough and shortness of breath.    Cardiovascular: Negative for chest pain, palpitations and peripheral edema.   Gastrointestinal: Negative for abdominal pain, constipation, diarrhea, heartburn, hematochezia and nausea.   Genitourinary: Negative for discharge, dysuria, frequency, genital sores, hematuria, impotence and urgency.   Musculoskeletal: Negative for arthralgias, joint swelling and myalgias.   Skin: Negative for rash.   Neurological: Negative for dizziness, weakness, headaches and paresthesias.   Psychiatric/Behavioral: Negative for mood changes. The patient is not nervous/anxious.      All other ROS negative  OBJECTIVE:   There were no vitals taken for this visit.  /82   Pulse 72   Temp 99.2  F (37.3  C) (Oral)   Resp 16   Ht 1.715 m (5' 7.5\")   Wt 83.9 kg (185 lb)   SpO2 97%   BMI 28.55 kg/m     Physical Exam  GENERAL: healthy, alert and no distress  EYES: Eyes grossly normal to inspection, PERRL and conjunctivae and sclerae normal  HENT: ear canals and TM's normal, nose and mouth without ulcers or lesions  NECK: no adenopathy, no asymmetry, masses, or scars and thyroid normal to palpation  RESP: lungs clear to auscultation - no rales, rhonchi or wheezes  BREAST: normal without masses, tenderness or nipple discharge and no palpable axillary masses or adenopathy  CV: regular rate and rhythm, normal S1 S2, no S3 or S4, no murmur, click or rub, no peripheral edema and peripheral pulses strong  ABDOMEN: soft, nontender, no hepatosplenomegaly, no masses and bowel sounds normal   (male): patient deferred exam. Denies pain/masses/hernia. Patient deferred rectal too  MS: no gross musculoskeletal defects noted, no edema  SKIN: no suspicious lesions or rashes  NEURO: Normal strength and tone, mentation " "intact and speech normal  PSYCH: mentation appears normal, affect normal/bright  LYMPH: no cervical, supraclavicular, axillary, or inguinal adenopathy    ASSESSMENT/PLAN:   ASSESSMENT / PLAN:  (Z00.00) Routine general medical examination at a health care facility  (primary encounter diagnosis)  Comment: generally healthy and normal exam/labs  Plan: Reviewed self mole/testicle check handout.      (I10) Hypertension goal BP (blood pressure) < 140/90  Comment: stable  Plan: metoprolol succinate ER (TOPROL-XL) 50 MG 24 hr        tablet        Continue self-monitor and exercise. Lipids ok. Non-smoker. Chest pain or shortness of breath to er.        Patient has been advised of split billing requirements and indicates understanding: Yes  COUNSELING:   Reviewed preventive health counseling, as reflected in patient instructions       Regular exercise       Healthy diet/nutrition       Vision screening       Colon cancer screening       Prostate cancer screening       Osteoporosis prevention/bone health    Estimated body mass index is 29.47 kg/m  as calculated from the following:    Height as of 2/11/20: 1.715 m (5' 7.5\").    Weight as of 2/11/20: 86.6 kg (191 lb).        He reports that he has never smoked. He has quit using smokeless tobacco.      Counseling Resources:  ATP IV Guidelines  Pooled Cohorts Equation Calculator  FRAX Risk Assessment  ICSI Preventive Guidelines  Dietary Guidelines for Americans, 2010  USDA's MyPlate  ASA Prophylaxis  Lung CA Screening    Rj Mejia MD  [facility]  "

## 2020-12-06 ENCOUNTER — HEALTH MAINTENANCE LETTER (OUTPATIENT)
Age: 50
End: 2020-12-06

## 2020-12-21 ENCOUNTER — MYC MEDICAL ADVICE (OUTPATIENT)
Dept: FAMILY MEDICINE | Facility: CLINIC | Age: 50
End: 2020-12-21

## 2020-12-21 DIAGNOSIS — I10 HYPERTENSION GOAL BP (BLOOD PRESSURE) < 140/90: ICD-10-CM

## 2020-12-21 RX ORDER — METOPROLOL SUCCINATE 50 MG/1
TABLET, EXTENDED RELEASE ORAL
Qty: 90 TABLET | Refills: 3 | Status: SHIPPED | OUTPATIENT
Start: 2020-12-21 | End: 2021-12-07

## 2021-04-16 ENCOUNTER — TRANSFERRED RECORDS (OUTPATIENT)
Dept: HEALTH INFORMATION MANAGEMENT | Facility: CLINIC | Age: 51
End: 2021-04-16

## 2021-05-27 ENCOUNTER — RECORDS - HEALTHEAST (OUTPATIENT)
Dept: ADMINISTRATIVE | Facility: CLINIC | Age: 51
End: 2021-05-27

## 2021-06-07 NOTE — NURSING NOTE
"Chief Complaint   Patient presents with     Physical     ERIK, Pt already had labs completed       Initial BP (!) 143/92  Pulse 66  Temp 97.8  F (36.6  C) (Oral)  Wt 172 lb (78 kg)  SpO2 98%  BMI 26.54 kg/m2 Estimated body mass index is 26.54 kg/(m^2) as calculated from the following:    Height as of 8/14/17: 5' 7.5\" (1.715 m).    Weight as of this encounter: 172 lb (78 kg).  Medication Reconciliation: complete      Valeriy Crooks MA    " 77

## 2021-09-26 ENCOUNTER — HEALTH MAINTENANCE LETTER (OUTPATIENT)
Age: 51
End: 2021-09-26

## 2021-10-18 DIAGNOSIS — I10 ESSENTIAL HYPERTENSION: ICD-10-CM

## 2021-10-19 RX ORDER — AMLODIPINE BESYLATE 5 MG/1
TABLET ORAL
Qty: 90 TABLET | Refills: 0 | Status: SHIPPED | OUTPATIENT
Start: 2021-10-19 | End: 2021-12-07

## 2021-11-18 ENCOUNTER — DOCUMENTATION ONLY (OUTPATIENT)
Dept: LAB | Facility: CLINIC | Age: 51
End: 2021-11-18
Payer: COMMERCIAL

## 2021-11-18 DIAGNOSIS — Z00.00 PREVENTATIVE HEALTH CARE: Primary | ICD-10-CM

## 2021-11-18 DIAGNOSIS — Z12.5 SCREENING PSA (PROSTATE SPECIFIC ANTIGEN): ICD-10-CM

## 2021-11-18 DIAGNOSIS — Z13.6 CARDIOVASCULAR SCREENING; LDL GOAL LESS THAN 160: ICD-10-CM

## 2021-11-19 NOTE — PROGRESS NOTES
Marcus Delacruz has an upcoming lab appointment:    Future Appointments   Date Time Provider Department [place]   11/22/2021 11:00 AM [facility]   12/7/2021  7:00 AM [facility]     Patient is scheduled for the following lab(s): PATIENT HAS UPCOMING APPT. WITH DR. ROMERO AND IS REQUESTING A FASTING GLUCOSE.  HEALTH MAINTENANCE LABS ARE ALSO DUE.    There is no order available. Please review and place either future orders or HMPO (Review of Health Maintenance Protocol Orders), as appropriate.    Health Maintenance Due   Topic     ANNUAL REVIEW OF HM ORDERS      HIV SCREENING      LIPID      Jaqueline Fry

## 2021-11-22 ENCOUNTER — LAB (OUTPATIENT)
Dept: LAB | Facility: CLINIC | Age: 51
End: 2021-11-22
Payer: COMMERCIAL

## 2021-11-22 DIAGNOSIS — Z00.00 PREVENTATIVE HEALTH CARE: ICD-10-CM

## 2021-11-22 DIAGNOSIS — Z12.5 SCREENING PSA (PROSTATE SPECIFIC ANTIGEN): ICD-10-CM

## 2021-11-22 DIAGNOSIS — Z13.6 CARDIOVASCULAR SCREENING; LDL GOAL LESS THAN 160: ICD-10-CM

## 2021-11-22 LAB
ALBUMIN SERPL-MCNC: 4 G/DL (ref 3.4–5)
ALP SERPL-CCNC: 73 U/L (ref 40–150)
ALT SERPL W P-5'-P-CCNC: 76 U/L (ref 0–70)
ANION GAP SERPL CALCULATED.3IONS-SCNC: 5 MMOL/L (ref 3–14)
AST SERPL W P-5'-P-CCNC: 38 U/L (ref 0–45)
BILIRUB SERPL-MCNC: 0.5 MG/DL (ref 0.2–1.3)
BUN SERPL-MCNC: 12 MG/DL (ref 7–30)
CALCIUM SERPL-MCNC: 9.2 MG/DL (ref 8.5–10.1)
CHLORIDE BLD-SCNC: 105 MMOL/L (ref 94–109)
CHOLEST SERPL-MCNC: 162 MG/DL
CO2 SERPL-SCNC: 29 MMOL/L (ref 20–32)
CREAT SERPL-MCNC: 0.93 MG/DL (ref 0.66–1.25)
ERYTHROCYTE [DISTWIDTH] IN BLOOD BY AUTOMATED COUNT: 13.1 % (ref 10–15)
FASTING STATUS PATIENT QL REPORTED: YES
GFR SERPL CREATININE-BSD FRML MDRD: >90 ML/MIN/1.73M2
GLUCOSE BLD-MCNC: 112 MG/DL (ref 70–99)
HCT VFR BLD AUTO: 48.6 % (ref 40–53)
HDLC SERPL-MCNC: 58 MG/DL
HGB BLD-MCNC: 16.1 G/DL (ref 13.3–17.7)
LDLC SERPL CALC-MCNC: 78 MG/DL
MCH RBC QN AUTO: 29.7 PG (ref 26.5–33)
MCHC RBC AUTO-ENTMCNC: 33.1 G/DL (ref 31.5–36.5)
MCV RBC AUTO: 90 FL (ref 78–100)
NONHDLC SERPL-MCNC: 104 MG/DL
PLATELET # BLD AUTO: 188 10E3/UL (ref 150–450)
POTASSIUM BLD-SCNC: 4.5 MMOL/L (ref 3.4–5.3)
PROT SERPL-MCNC: 7.7 G/DL (ref 6.8–8.8)
PSA SERPL-MCNC: 1.13 UG/L (ref 0–4)
RBC # BLD AUTO: 5.43 10E6/UL (ref 4.4–5.9)
SODIUM SERPL-SCNC: 139 MMOL/L (ref 133–144)
TRIGL SERPL-MCNC: 128 MG/DL
WBC # BLD AUTO: 6.2 10E3/UL (ref 4–11)

## 2021-11-22 PROCEDURE — 80053 COMPREHEN METABOLIC PANEL: CPT

## 2021-11-22 PROCEDURE — 80061 LIPID PANEL: CPT

## 2021-11-22 PROCEDURE — 36415 COLL VENOUS BLD VENIPUNCTURE: CPT

## 2021-11-22 PROCEDURE — 85027 COMPLETE CBC AUTOMATED: CPT

## 2021-11-22 PROCEDURE — G0103 PSA SCREENING: HCPCS

## 2021-12-07 ENCOUNTER — OFFICE VISIT (OUTPATIENT)
Dept: FAMILY MEDICINE | Facility: CLINIC | Age: 51
End: 2021-12-07
Payer: COMMERCIAL

## 2021-12-07 VITALS
RESPIRATION RATE: 14 BRPM | WEIGHT: 187 LBS | OXYGEN SATURATION: 96 % | BODY MASS INDEX: 28.34 KG/M2 | HEIGHT: 68 IN | DIASTOLIC BLOOD PRESSURE: 83 MMHG | SYSTOLIC BLOOD PRESSURE: 138 MMHG | HEART RATE: 77 BPM | TEMPERATURE: 97.9 F

## 2021-12-07 DIAGNOSIS — K76.0 FATTY LIVER: ICD-10-CM

## 2021-12-07 DIAGNOSIS — I10 ESSENTIAL HYPERTENSION: ICD-10-CM

## 2021-12-07 DIAGNOSIS — Z00.00 ROUTINE GENERAL MEDICAL EXAMINATION AT A HEALTH CARE FACILITY: Primary | ICD-10-CM

## 2021-12-07 DIAGNOSIS — R06.83 SNORING: ICD-10-CM

## 2021-12-07 DIAGNOSIS — N52.8 OTHER MALE ERECTILE DYSFUNCTION: ICD-10-CM

## 2021-12-07 DIAGNOSIS — I10 HYPERTENSION GOAL BP (BLOOD PRESSURE) < 140/90: ICD-10-CM

## 2021-12-07 PROCEDURE — 99213 OFFICE O/P EST LOW 20 MIN: CPT | Mod: 25 | Performed by: FAMILY MEDICINE

## 2021-12-07 PROCEDURE — 99396 PREV VISIT EST AGE 40-64: CPT | Performed by: FAMILY MEDICINE

## 2021-12-07 RX ORDER — METOPROLOL SUCCINATE 50 MG/1
TABLET, EXTENDED RELEASE ORAL
Qty: 90 TABLET | Refills: 3 | Status: SHIPPED | OUTPATIENT
Start: 2021-12-07 | End: 2022-01-14

## 2021-12-07 RX ORDER — SILDENAFIL CITRATE 20 MG/1
TABLET ORAL
Qty: 30 TABLET | Refills: 2 | Status: SHIPPED | OUTPATIENT
Start: 2021-12-07 | End: 2023-08-01

## 2021-12-07 RX ORDER — AMLODIPINE BESYLATE 5 MG/1
5 TABLET ORAL DAILY
Qty: 90 TABLET | Refills: 3 | Status: SHIPPED | OUTPATIENT
Start: 2021-12-07 | End: 2022-01-17

## 2021-12-07 ASSESSMENT — ENCOUNTER SYMPTOMS
ARTHRALGIAS: 0
NAUSEA: 0
SORE THROAT: 0
CONSTIPATION: 0
FREQUENCY: 0
HEARTBURN: 0
JOINT SWELLING: 0
HEMATURIA: 0
HEMATOCHEZIA: 0
PALPITATIONS: 0
FEVER: 0
ABDOMINAL PAIN: 0
DIZZINESS: 0
MYALGIAS: 0
PARESTHESIAS: 0
EYE PAIN: 0
SHORTNESS OF BREATH: 0
DIARRHEA: 0
COUGH: 0
CHILLS: 0
HEADACHES: 0
DYSURIA: 0
NERVOUS/ANXIOUS: 0
WEAKNESS: 0

## 2021-12-07 ASSESSMENT — MIFFLIN-ST. JEOR: SCORE: 1659.79

## 2021-12-07 NOTE — PROGRESS NOTES
SUBJECTIVE:   CC: Marcus Delacruz is an 51 year old male who presents for preventative health visit.     Follow-up htn and hyperglycemia. Ed issues.  One liver test high.   May have had  Some ALCOHOL  Before test.   No abdominal pain. Some exercise. No chest pain or shortness of breath. No nausea, vomiting or diarrhea or black/bloody stools. No urine changes or hematuria.   Born with one kidney. History fatty liver in past. Negative HepB/C in past.   No pop. No low carb  No mole changes or rashes. Drinking milk.   Emotionally doing ok. No std concerns. No testicle pain/masses/henria.   Dentist q6 months. Eye exam in past year.   Apnea episodes noted with surgery.   Outside blood pressure readings ok.     Patient has been advised of split billing requirements and indicates understanding: Yes  Healthy Habits:     Getting at least 3 servings of Calcium per day:  Yes    Bi-annual eye exam:  Yes    Dental care twice a year:  Yes    Sleep apnea or symptoms of sleep apnea:  Excessive snoring    Diet:  Regular (no restrictions)    Frequency of exercise:  4-5 days/week    Duration of exercise:  30-45 minutes    Taking medications regularly:  Yes    Medication side effects:  None    PHQ-2 Total Score: 0    Additional concerns today:  No      Sleep study referral     Today's PHQ-2 Score:   PHQ-2 ( 1999 Pfizer) 12/7/2021   Q1: Little interest or pleasure in doing things 0   Q2: Feeling down, depressed or hopeless 0   PHQ-2 Score 0   PHQ-2 Total Score (12-17 Years)- Positive if 3 or more points; Administer PHQ-A if positive -   Q1: Little interest or pleasure in doing things Not at all   Q2: Feeling down, depressed or hopeless Not at all   PHQ-2 Score 0       Abuse: Current or Past(Physical, Sexual or Emotional)- No  Do you feel safe in your environment? Yes    Social History     Tobacco Use     Smoking status: Never Smoker     Smokeless tobacco: Former User     Tobacco comment: NON SMOKING HOME   Substance Use Topics      "Alcohol use: Yes     Comment: OCC     If you drink alcohol do you typically have >3 drinks per day or >7 drinks per week? No    Alcohol Use 12/7/2021   Prescreen: >3 drinks/day or >7 drinks/week? No   Prescreen: >3 drinks/day or >7 drinks/week? -       Last PSA:   PSA   Date Value Ref Range Status   10/29/2020 0.84 0 - 4 ug/L Final     Comment:     Assay Method:  Chemiluminescence using Siemens Vista analyzer     Prostate Specific Antigen Screen   Date Value Ref Range Status   11/22/2021 1.13 0.00 - 4.00 ug/L Final       Reviewed orders with patient. Reviewed health maintenance and updated orders accordingly - Yes  Labs reviewed in EPIC    Reviewed and updated as needed this visit by clinical staff                Reviewed and updated as needed this visit by Provider                 Review of Systems   Constitutional: Negative for chills and fever.   HENT: Negative for congestion, ear pain, hearing loss and sore throat.    Eyes: Negative for pain and visual disturbance.   Respiratory: Negative for cough and shortness of breath.    Cardiovascular: Negative for chest pain, palpitations and peripheral edema.   Gastrointestinal: Negative for abdominal pain, constipation, diarrhea, heartburn, hematochezia and nausea.   Genitourinary: Positive for impotence. Negative for dysuria, frequency, genital sores, hematuria, penile discharge and urgency.   Musculoskeletal: Negative for arthralgias, joint swelling and myalgias.   Skin: Negative for rash.   Neurological: Negative for dizziness, weakness, headaches and paresthesias.   Psychiatric/Behavioral: Negative for mood changes. The patient is not nervous/anxious.      All other ROS negative.     OBJECTIVE:   There were no vitals taken for this visit.  /83   Pulse 77   Temp 97.9  F (36.6  C) (Tympanic)   Resp 14   Ht 1.715 m (5' 7.5\")   Wt 84.8 kg (187 lb)   SpO2 96%   BMI 28.86 kg/m     Physical Exam  GENERAL: healthy, alert and no distress  EYES: Eyes grossly " normal to inspection, PERRL and conjunctivae and sclerae normal  HENT: ear canals and TM's normal, nose and mouth without ulcers or lesions  NECK: no adenopathy, no asymmetry, masses, or scars and thyroid normal to palpation  RESP: lungs clear to auscultation - no rales, rhonchi or wheezes  BREAST: normal without masses, tenderness or nipple discharge and no palpable axillary masses or adenopathy  CV: regular rate and rhythm, normal S1 S2, no S3 or S4, no murmur, click or rub, no peripheral edema and peripheral pulses strong  ABDOMEN: soft, nontender, no hepatosplenomegaly, no masses and bowel sounds normal   (male): patient deferred /rectal exams  MS: no gross musculoskeletal defects noted, no edema  SKIN: no suspicious lesions or rashes  NEURO: Normal strength and tone, mentation intact and speech normal  PSYCH: mentation appears normal, affect normal/bright  PSYCH: mildly anxious  LYMPH: no cervical, supraclavicular, axillary adenopathy      ASSESSMENT/PLAN:     ASSESSMENT / PLAN:  (Z00.00) Routine general medical examination at a health care facility  (primary encounter diagnosis)  Comment: generally healthy and normal exam/labs  Plan: Reviewed self mole/testicle check handout.  VitaminD.     (I10) Essential hypertension  Plan: amLODIPine (NORVASC) 5 MG tablet            (I10) Hypertension goal BP (blood pressure) < 140/90  Comment: stable  Plan: metoprolol succinate ER (TOPROL-XL) 50 MG 24 hr        tablet        cotn self-monitor. Try toprol at at bedtime and norvasc in am. Exercise. Return to clinic if worse. Chest pain or shortness of breath to er.     (K76.0) Fatty liver  Comment: likely source of slightly high lft  Plan: weight loss/low carb diet and avoid ALCOHOL. Repeat imaging if symptoms. Expected course and warning signs reviewed. Call/email with questions/concerns     (N52.8) Other male erectile dysfunction  Plan: sildenafil (REVATIO) 20 MG tablet        To ER if chest pain, shortness of breath ,  "prolonged erections or visua     (R06.83) Snoring  Comment: possible mild mera  Plan: limit ALCOHOL. Increase exercise, sleep on side and try breath-rite. Patient will monitor with smart watch and wife to monitor. Sleep study if worse but patient NOT intersrted in mask and not really having daytime fatigue issues.       Patient has been advised of split billing requirements and indicates understanding: Yes  COUNSELING:   Reviewed preventive health counseling, as reflected in patient instructions       Regular exercise       Healthy diet/nutrition       Vision screening       Colon cancer screening       Prostate cancer screening       Osteoporosis prevention/bone health    Estimated body mass index is 28.55 kg/m  as calculated from the following:    Height as of 11/5/20: 1.715 m (5' 7.5\").    Weight as of 11/5/20: 83.9 kg (185 lb).     Weight management plan: Discussed healthy diet and exercise guidelines    He reports that he has never smoked. He has quit using smokeless tobacco.      Counseling Resources:  ATP IV Guidelines  Pooled Cohorts Equation Calculator  FRAX Risk Assessment  ICSI Preventive Guidelines  Dietary Guidelines for Americans, 2010  USDA's MyPlate  ASA Prophylaxis  Lung CA Screening    Rj Mejia MD  [facility]  "

## 2022-01-16 DIAGNOSIS — I10 ESSENTIAL HYPERTENSION: ICD-10-CM

## 2022-01-17 RX ORDER — AMLODIPINE BESYLATE 5 MG/1
TABLET ORAL
Qty: 90 TABLET | Refills: 3 | Status: SHIPPED | OUTPATIENT
Start: 2022-01-17 | End: 2022-11-14

## 2022-03-04 ENCOUNTER — TRANSFERRED RECORDS (OUTPATIENT)
Dept: HEALTH INFORMATION MANAGEMENT | Facility: CLINIC | Age: 52
End: 2022-03-04
Payer: COMMERCIAL

## 2022-03-07 DIAGNOSIS — Z79.2 NEED FOR PROPHYLACTIC ANTIBIOTIC: Primary | ICD-10-CM

## 2022-03-07 RX ORDER — CLINDAMYCIN HCL 300 MG
CAPSULE ORAL
Qty: 2 CAPSULE | Refills: 1 | Status: SHIPPED | OUTPATIENT
Start: 2022-03-07 | End: 2023-05-03

## 2022-03-09 ENCOUNTER — TRANSFERRED RECORDS (OUTPATIENT)
Dept: HEALTH INFORMATION MANAGEMENT | Facility: CLINIC | Age: 52
End: 2022-03-09
Payer: COMMERCIAL

## 2022-04-07 NOTE — PROGRESS NOTES
[facility]  [id number] [facility] [address]  Phone: [phone]  Primary Provider: Kelly Romero  Pre-op Performing Provider: KELLY ROMERO      PREOPERATIVE EVALUATION:  Today's date: 4/8/2022    Marcus Delacruz is a 51 year old male who presents for a preoperative evaluation.  History kidneys and had procedure done years ago and was helpful and went well.  No fevers or chills. Some emesis from kidney stones/pain.   Had colitis last month. No antibiotics given. Some loose stools. No travel or sick contacts. No bloody stools. Lower abdominal pain improving. Normal colonoscopy 3 years ago. No similar issues.   No dysuria.   Follow-up htn, ed and hyperglycemia  Surgical Information:   Surgery/Procedure: CYSTOSCOPY RIGHT URETEROSCOPY HOLMIUM LASER (C-ARM)  Surgery Location: [facility]  Surgeon: Celso  Surgery Date: 4/13/2022  Time of Surgery: 3:30 pm  Where patient plans to recover: At home with family  Fax number for surgical facility:     Type of Anesthesia Anticipated: to be determined    ASSESSMENT / PLAN:  (Z01.818) Pre-op exam  (primary encounter diagnosis)  Comment: denies chest pain or shortness of breath. Low grade colitis  Plan: EKG 12-lead complete w/read - Clinics, CBC with        platelets        Ok for procedure if colitis symptoms resolving. ekg ok. Cbc ok. Did ok with procedure in past. Might need to postpone if not improving.     (N20.0) Kidney stone  Plan: per urology.     (I10) Hypertension goal BP (blood pressure) < 140/90  Comment: stable      (K52.9) Colitis  Comment: low grade.   Plan: azithromycin (ZITHROMAX) 250 MG tablet,         metroNIDAZOLE (FLAGYL) 500 MG tablet        Unfortunately has drug allegies to normal antibiotics. Patient interested in antibiotics. Cbc ok and no fevers or chills. Liquid diet and slowly ADAT. Will need to hold surgery if not improving. Call/email with questions/concerns. Reveiwed risks and side effects of medication  To ER if  worse. GI follow-up if persists.          Subjective     HPI related to upcoming procedure: kidney stones.       Preop Questions 4/8/2022   1. Have you ever had a heart attack or stroke? No   2. Have you ever had surgery on your heart or blood vessels, such as a stent placement, a coronary artery bypass, or surgery on an artery in your head, neck, heart, or legs? No   3. Do you have chest pain with activity? No   4. Do you have a history of  heart failure? No   5. Do you currently have a cold, bronchitis or symptoms of other infection? No   6. Do you have a cough, shortness of breath, or wheezing? No   7. Do you or anyone in your family have previous history of blood clots? No   8. Do you or does anyone in your family have a serious bleeding problem such as prolonged bleeding following surgeries or cuts? No   9. Have you ever had problems with anemia or been told to take iron pills? No   10. Have you had any abnormal blood loss such as black, tarry or bloody stools? No   11. Have you ever had a blood transfusion? No   12. Are you willing to have a blood transfusion if it is medically needed before, during, or after your surgery? Yes   13. Have you or any of your relatives ever had problems with anesthesia? No   14. Do you have sleep apnea, excessive snoring or daytime drowsiness? No   15. Do you have any artifical heart valves or other implanted medical devices like a pacemaker, defibrillator, or continuous glucose monitor? No   16. Do you have artificial joints? No   17. Are you allergic to latex? No     Health Care Directive:  Patient does not have a Health Care Directive or Living Will: Discussed advance care planning with patient; however, patient declined at this time.    Preoperative Review of :   reviewed - controlled substances reflected in medication list.      Review of Systems  All other ROS negative.    Patient Active Problem List    Diagnosis Date Noted     Other male erectile dysfunction  11/11/2016     Priority: Medium     Hyperglycemia 11/11/2016     Priority: Medium     Special screening for malignant neoplasm of prostate 10/22/2015     Priority: Medium     Rotator cuff tear 05/12/2015     Priority: Medium     Hypertension goal BP (blood pressure) < 140/90 10/09/2014     Priority: Medium     Microhematuria 10/09/2014     Priority: Medium     Elevated blood pressure reading without diagnosis of hypertension 09/19/2012     Priority: Medium     Pain in joint, lower leg 10/20/2011     Priority: Medium     Kidney congenitally absent, 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     Priority: Medium     Kidney stones 09/16/2011     Priority: Medium     CARDIOVASCULAR SCREENING; LDL GOAL LESS THAN 160 10/31/2010     Priority: Medium      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     Kidney stones 9/16/2011     Past Surgical History:   Procedure Laterality Date     EXTRACORPOREAL SHOCK WAVE LITHOTRIPSY (ESWL)  .3.4.2016     GENITOURINARY SURGERY  Kidney Stones    21 Lithotripsy procedures     HERNIA REPAIR  2017     JOINT REPLACEMENT Right 03/2020     ORTHOPEDIC SURGERY  2015    right shoulder rotor cuff     TONSILLECTOMY  2008     Current Outpatient Medications   Medication Sig Dispense Refill     amLODIPine (NORVASC) 5 MG tablet TAKE 1 TABLET DAILY 90 tablet 3     clindamycin (CLEOCIN) 300 MG capsule TAKE 2 CAPSULES BY MOUTH 1 HOUR PRIOR TO DENTAL APPOINTMENT. 2 capsule 1     metoprolol succinate ER (TOPROL-XL) 50 MG 24 hr tablet TAKE 1 TABLET AT BEDTIME FOR BLOOD PRESSURE 90 tablet 3     sildenafil (REVATIO) 20 MG tablet 2-3 tabs daily as needed for ED 30 tablet 2       Allergies   Allergen Reactions     Cephalosporins Anaphylaxis     Severe reaction. anaphylaxis     Azo [Phenazopyridine] Hives     Flomax [Tamsulosin] Hives     Levaquin [Levofloxacin Hemihydrate] Hives     Amoxicillin Hives     Detrol [Tolterodine Tartrate]  "Hives        Social History     Tobacco Use     Smoking status: Never Smoker     Smokeless tobacco: Former User     Tobacco comment: NON SMOKING HOME   Substance Use Topics     Alcohol use: Yes     Comment: OCC       History   Drug Use No         Objective   /80   Pulse 80   Temp 98.5  F (36.9  C) (Oral)   Resp 18   Ht 1.721 m (5' 7.75\")   Wt 81.1 kg (178 lb 12.8 oz)   SpO2 96%   BMI 27.39 kg/m     Physical Exam  GENERAL APPEARANCE: healthy, alert and no distress  EYES: Eyes grossly normal to inspection, PERRL and conjunctivae and sclerae normal  HENT: ear canals and TM's normal and nose and mouth without ulcers or lesions  NECK: no adenopathy, no asymmetry, masses, or scars and thyroid normal to palpation  RESP: lungs clear to auscultation - no rales, rhonchi or wheezes  CV: regular rate and rhythm, normal S1 S2, no S3 or S4 and no murmur, click or rub   ABDOMEN: soft, no HSM or masses and bowel sounds normal  ABDOMEN:mildly tenderness bilateral lower abdominal area. No guarding/rebound.   MS: extremities normal- no gross deformities noted  NEURO: Normal strength and tone, sensory exam grossly normal, mentation intact and speech normal  PSYCH: mentation appears normal and affect normal/bright    Recent Labs   Lab Test 11/22/21  1111 10/29/20  0822   HGB 16.1 15.5    235    138   POTASSIUM 4.5 4.5   CR 0.93 1.02        Diagnostics:  Recent Results (from the past 24 hour(s))   CBC with platelets    Collection Time: 04/08/22  9:56 AM   Result Value Ref Range    WBC Count 6.5 4.0 - 11.0 10e3/uL    RBC Count 5.13 4.40 - 5.90 10e6/uL    Hemoglobin 15.4 13.3 - 17.7 g/dL    Hematocrit 44.9 40.0 - 53.0 %    MCV 88 78 - 100 fL    MCH 30.0 26.5 - 33.0 pg    MCHC 34.3 31.5 - 36.5 g/dL    RDW 13.1 10.0 - 15.0 %    Platelet Count 201 150 - 450 10e3/uL      EKG: appears normal, NSR, normal axis, normal intervals, no acute ST/T changes c/w ischemia, no LVH by voltage criteria, unchanged from previous " tracings    Revised Cardiac Risk Index (RCRI):  The patient has the following serious cardiovascular risks for perioperative complications:   - No serious cardiac risks = 0 points     RCRI Interpretation: 1 point: Class II (low risk - 0.9% complication rate)         Time spent 60 minutes.   Signed Electronically by: Rj Mejia MD  Copy of this evaluation report is provided to requesting physician.

## 2022-04-08 ENCOUNTER — OFFICE VISIT (OUTPATIENT)
Dept: FAMILY MEDICINE | Facility: CLINIC | Age: 52
End: 2022-04-08
Payer: COMMERCIAL

## 2022-04-08 VITALS
SYSTOLIC BLOOD PRESSURE: 127 MMHG | DIASTOLIC BLOOD PRESSURE: 80 MMHG | OXYGEN SATURATION: 96 % | RESPIRATION RATE: 18 BRPM | HEIGHT: 68 IN | BODY MASS INDEX: 27.1 KG/M2 | WEIGHT: 178.8 LBS | HEART RATE: 80 BPM | TEMPERATURE: 98.5 F

## 2022-04-08 DIAGNOSIS — K52.9 COLITIS: ICD-10-CM

## 2022-04-08 DIAGNOSIS — Z01.818 PRE-OP EXAM: Primary | ICD-10-CM

## 2022-04-08 DIAGNOSIS — I10 HYPERTENSION GOAL BP (BLOOD PRESSURE) < 140/90: ICD-10-CM

## 2022-04-08 DIAGNOSIS — N20.0 KIDNEY STONE: ICD-10-CM

## 2022-04-08 LAB
ERYTHROCYTE [DISTWIDTH] IN BLOOD BY AUTOMATED COUNT: 13.1 % (ref 10–15)
HCT VFR BLD AUTO: 44.9 % (ref 40–53)
HGB BLD-MCNC: 15.4 G/DL (ref 13.3–17.7)
MCH RBC QN AUTO: 30 PG (ref 26.5–33)
MCHC RBC AUTO-ENTMCNC: 34.3 G/DL (ref 31.5–36.5)
MCV RBC AUTO: 88 FL (ref 78–100)
PLATELET # BLD AUTO: 201 10E3/UL (ref 150–450)
RBC # BLD AUTO: 5.13 10E6/UL (ref 4.4–5.9)
WBC # BLD AUTO: 6.5 10E3/UL (ref 4–11)

## 2022-04-08 PROCEDURE — 93000 ELECTROCARDIOGRAM COMPLETE: CPT | Performed by: FAMILY MEDICINE

## 2022-04-08 PROCEDURE — 99214 OFFICE O/P EST MOD 30 MIN: CPT | Performed by: FAMILY MEDICINE

## 2022-04-08 PROCEDURE — 85027 COMPLETE CBC AUTOMATED: CPT | Performed by: FAMILY MEDICINE

## 2022-04-08 PROCEDURE — 36415 COLL VENOUS BLD VENIPUNCTURE: CPT | Performed by: FAMILY MEDICINE

## 2022-04-08 RX ORDER — METRONIDAZOLE 500 MG/1
500 TABLET ORAL 2 TIMES DAILY
Qty: 10 TABLET | Refills: 0 | Status: SHIPPED | OUTPATIENT
Start: 2022-04-08 | End: 2022-04-13

## 2022-04-08 RX ORDER — AZITHROMYCIN 250 MG/1
TABLET, FILM COATED ORAL
Qty: 6 TABLET | Refills: 0 | Status: SHIPPED | OUTPATIENT
Start: 2022-04-08 | End: 2022-04-13

## 2022-06-23 ENCOUNTER — MYC MEDICAL ADVICE (OUTPATIENT)
Dept: FAMILY MEDICINE | Facility: CLINIC | Age: 52
End: 2022-06-23

## 2022-10-21 ENCOUNTER — DOCUMENTATION ONLY (OUTPATIENT)
Dept: LAB | Facility: CLINIC | Age: 52
End: 2022-10-21

## 2022-10-21 NOTE — PROGRESS NOTES
Patient not technically due for physical until mid December.I will place order for previsit labs if insurance will cover.

## 2022-10-21 NOTE — PROGRESS NOTES
Marcus JASIEL Delacruz has an upcoming lab appointment:    Future Appointments   Date Time Provider Department Center   10/31/2022  7:30 AM AN [facility]   11/14/2022  5:00 PM Rj Me[facility]         There is no order available. Please review and place either future orders or HMPO (Review of Health Maintenance Protocol Orders), as appropriate.    Health Maintenance Due   Topic     ANNUAL REVIEW OF HM ORDERS      HIV SCREENING      Gudelia Baptiste

## 2022-10-24 ENCOUNTER — MYC MEDICAL ADVICE (OUTPATIENT)
Dept: FAMILY MEDICINE | Facility: CLINIC | Age: 52
End: 2022-10-24

## 2022-10-25 NOTE — PROGRESS NOTES
Teena message from patient:    Jatinder Jolley, I checked and we will be good to go for the insurance.     Thank you!    Marcus

## 2022-10-31 ENCOUNTER — DOCUMENTATION ONLY (OUTPATIENT)
Dept: LAB | Facility: CLINIC | Age: 52
End: 2022-10-31

## 2022-10-31 ENCOUNTER — LAB (OUTPATIENT)
Dept: LAB | Facility: CLINIC | Age: 52
End: 2022-10-31
Payer: COMMERCIAL

## 2022-10-31 DIAGNOSIS — R73.9 HYPERGLYCEMIA: ICD-10-CM

## 2022-10-31 DIAGNOSIS — I10 HYPERTENSION GOAL BP (BLOOD PRESSURE) < 140/90: Primary | ICD-10-CM

## 2022-10-31 DIAGNOSIS — Z13.6 CARDIOVASCULAR SCREENING; LDL GOAL LESS THAN 160: ICD-10-CM

## 2022-10-31 DIAGNOSIS — I10 HYPERTENSION GOAL BP (BLOOD PRESSURE) < 140/90: ICD-10-CM

## 2022-10-31 DIAGNOSIS — Z12.5 SCREENING PSA (PROSTATE SPECIFIC ANTIGEN): ICD-10-CM

## 2022-10-31 LAB
HBA1C MFR BLD: 5.5 % (ref 0–5.6)
HOLD SPECIMEN: NORMAL

## 2022-10-31 PROCEDURE — 80069 RENAL FUNCTION PANEL: CPT

## 2022-10-31 PROCEDURE — 83036 HEMOGLOBIN GLYCOSYLATED A1C: CPT

## 2022-10-31 PROCEDURE — 80061 LIPID PANEL: CPT

## 2022-10-31 PROCEDURE — 36415 COLL VENOUS BLD VENIPUNCTURE: CPT

## 2022-10-31 NOTE — PROGRESS NOTES
A request was sent for orders for this patient's lab only appt for today 10-31-22, but it was closed with no orders placed. Please review and place future orders.    Thank you, Roseline Ojeda MLT

## 2022-11-01 LAB
ALBUMIN SERPL-MCNC: 3.3 G/DL (ref 3.4–5)
ANION GAP SERPL CALCULATED.3IONS-SCNC: 7 MMOL/L (ref 3–14)
BUN SERPL-MCNC: 19 MG/DL (ref 7–30)
CALCIUM SERPL-MCNC: 9.6 MG/DL (ref 8.5–10.1)
CHLORIDE BLD-SCNC: 103 MMOL/L (ref 94–109)
CHOLEST SERPL-MCNC: 167 MG/DL
CO2 SERPL-SCNC: 29 MMOL/L (ref 20–32)
CREAT SERPL-MCNC: 1.49 MG/DL (ref 0.66–1.25)
GFR SERPL CREATININE-BSD FRML MDRD: 55 ML/MIN/1.73M2
GLUCOSE BLD-MCNC: 124 MG/DL (ref 70–99)
HDLC SERPL-MCNC: 16 MG/DL
LDLC SERPL CALC-MCNC: ABNORMAL MG/DL
NONHDLC SERPL-MCNC: 151 MG/DL
PHOSPHATE SERPL-MCNC: 3.6 MG/DL (ref 2.5–4.5)
POTASSIUM BLD-SCNC: 4 MMOL/L (ref 3.4–5.3)
SODIUM SERPL-SCNC: 139 MMOL/L (ref 133–144)
TRIGL SERPL-MCNC: 599 MG/DL

## 2022-11-14 ENCOUNTER — OFFICE VISIT (OUTPATIENT)
Dept: FAMILY MEDICINE | Facility: CLINIC | Age: 52
End: 2022-11-14
Payer: COMMERCIAL

## 2022-11-14 VITALS
HEIGHT: 67 IN | HEART RATE: 95 BPM | BODY MASS INDEX: 26.62 KG/M2 | TEMPERATURE: 99.3 F | SYSTOLIC BLOOD PRESSURE: 138 MMHG | RESPIRATION RATE: 16 BRPM | DIASTOLIC BLOOD PRESSURE: 79 MMHG | WEIGHT: 169.6 LBS | OXYGEN SATURATION: 98 %

## 2022-11-14 DIAGNOSIS — R10.13 ABDOMINAL PAIN, EPIGASTRIC: ICD-10-CM

## 2022-11-14 DIAGNOSIS — Z00.00 ROUTINE GENERAL MEDICAL EXAMINATION AT A HEALTH CARE FACILITY: Primary | ICD-10-CM

## 2022-11-14 DIAGNOSIS — I10 HYPERTENSION GOAL BP (BLOOD PRESSURE) < 140/90: ICD-10-CM

## 2022-11-14 DIAGNOSIS — I10 ESSENTIAL HYPERTENSION: ICD-10-CM

## 2022-11-14 DIAGNOSIS — R06.83 SNORING: ICD-10-CM

## 2022-11-14 LAB
ALBUMIN SERPL-MCNC: 3.7 G/DL (ref 3.4–5)
ALP SERPL-CCNC: 102 U/L (ref 40–150)
ALT SERPL W P-5'-P-CCNC: 70 U/L (ref 0–70)
AST SERPL W P-5'-P-CCNC: 56 U/L (ref 0–45)
BILIRUB DIRECT SERPL-MCNC: 0.5 MG/DL (ref 0–0.2)
BILIRUB SERPL-MCNC: 0.6 MG/DL (ref 0.2–1.3)
ERYTHROCYTE [DISTWIDTH] IN BLOOD BY AUTOMATED COUNT: 12.8 % (ref 10–15)
HCT VFR BLD AUTO: 42.6 % (ref 40–53)
HGB BLD-MCNC: 14 G/DL (ref 13.3–17.7)
LIPASE SERPL-CCNC: 178 U/L (ref 73–393)
MCH RBC QN AUTO: 30.4 PG (ref 26.5–33)
MCHC RBC AUTO-ENTMCNC: 32.9 G/DL (ref 31.5–36.5)
MCV RBC AUTO: 92 FL (ref 78–100)
PLATELET # BLD AUTO: 199 10E3/UL (ref 150–450)
PROT SERPL-MCNC: 8.2 G/DL (ref 6.8–8.8)
RBC # BLD AUTO: 4.61 10E6/UL (ref 4.4–5.9)
WBC # BLD AUTO: 8.2 10E3/UL (ref 4–11)

## 2022-11-14 PROCEDURE — 83690 ASSAY OF LIPASE: CPT | Performed by: FAMILY MEDICINE

## 2022-11-14 PROCEDURE — 99214 OFFICE O/P EST MOD 30 MIN: CPT | Mod: 25 | Performed by: FAMILY MEDICINE

## 2022-11-14 PROCEDURE — 36415 COLL VENOUS BLD VENIPUNCTURE: CPT | Performed by: FAMILY MEDICINE

## 2022-11-14 PROCEDURE — 80076 HEPATIC FUNCTION PANEL: CPT | Performed by: FAMILY MEDICINE

## 2022-11-14 PROCEDURE — 85027 COMPLETE CBC AUTOMATED: CPT | Performed by: FAMILY MEDICINE

## 2022-11-14 PROCEDURE — 99396 PREV VISIT EST AGE 40-64: CPT | Performed by: FAMILY MEDICINE

## 2022-11-14 RX ORDER — AMLODIPINE BESYLATE 5 MG/1
5 TABLET ORAL DAILY
Qty: 90 TABLET | Refills: 3 | Status: SHIPPED | OUTPATIENT
Start: 2022-11-14 | End: 2023-04-17

## 2022-11-14 RX ORDER — METOPROLOL SUCCINATE 50 MG/1
TABLET, EXTENDED RELEASE ORAL
Qty: 90 TABLET | Refills: 3 | Status: SHIPPED | OUTPATIENT
Start: 2022-11-14 | End: 2023-02-19

## 2022-11-14 ASSESSMENT — ENCOUNTER SYMPTOMS
COUGH: 0
CONSTIPATION: 0
PARESTHESIAS: 0
HEARTBURN: 0
DYSURIA: 0
SHORTNESS OF BREATH: 0
DIZZINESS: 0
NAUSEA: 0
SORE THROAT: 0
HEADACHES: 0
EYE PAIN: 0
WEAKNESS: 0
HEMATURIA: 0
NERVOUS/ANXIOUS: 0
CHILLS: 0
FEVER: 0
ABDOMINAL PAIN: 1
FREQUENCY: 0
PALPITATIONS: 0
HEMATOCHEZIA: 0
DIARRHEA: 0
ARTHRALGIAS: 1
MYALGIAS: 0
JOINT SWELLING: 0

## 2022-11-14 ASSESSMENT — PAIN SCALES - GENERAL: PAINLEVEL: MODERATE PAIN (5)

## 2022-11-14 NOTE — PROGRESS NOTES
SUBJECTIVE:   CC: Marcus is an 52 year old who presents for preventative health visit.   Follow-up htn,high triglycerides,kidney stones and hyperglycemia.   History colitis - not seen GI yet. Started with abdominal pain in spring - had ct scan - showed colitis= patient was given antibiotic and improved. On/off since.   Some nausea and occasionally emesis. Some weight loss. Certain foods worse then others. Home blood pressure ok.   1 regular pop/day. ALCOHOL -occasionally beer - not heavy.   No acute urine changes. No sleep specialist yet.   Ed issues.   Some exercise. No chest pain or shortness of breath. History diarrhea or black/bloody stools. No urine changes or hematuria.   Born with one kidney. History fatty liver in past. Negative HepB/C in past.   No pop. No low carb  No mole changes or rashes. Drinking milk.   Emotionally doing ok. No std concerns. No testicle pain/masses/henria.   Dentist q6 months. Eye exam in past year.   Apnea episodes noted with surgery.   Outside blood pressure readings ok.  No history food allergies. Milk ok.   No testicle pain/masses/hernia. Eye exam in past year. Dentist regularly.   No mole chagnes  - seeing. emotionaly doing ok.   Exercise regularly.  No family history colon issues/autoimmune issues. No rashes. No family history gallstones.   Patient has been advised of split billing requirements and indicates understanding: Yes  HPI        Today's PHQ-2 Score:   PHQ-2 ( 1999 [employer]) 12/7/2021   Q1: Little interest or pleasure in doing things 0   Q2: Feeling down, depressed or hopeless 0   PHQ-2 Score 0   PHQ-2 Total Score (12-17 Years)- Positive if 3 or more points; Administer PHQ-A if positive -   Q1: Little interest or pleasure in doing things Not at all   Q2: Feeling down, depressed or hopeless Not at all   PHQ-2 Score 0       Abuse: Current or Past(Physical, Sexual or Emotional)- No  Do you feel safe in your environment? Yes    Have you ever done Advance Care Planning? (For  "example, a Health Directive, POLST, or a discussion with a medical provider or your loved ones about your wishes): No, advance care planning information given to patient to review.  Patient declined advance care planning discussion at this time.    Social History     Tobacco Use     Smoking status: Never     Smokeless tobacco: Former     Tobacco comments:     NON SMOKING HOME   Substance Use Topics     Alcohol use: Yes     Comment: OCC         Alcohol Use 12/7/2021   Prescreen: >3 drinks/day or >7 drinks/week? No   Prescreen: >3 drinks/day or >7 drinks/week? -       Last PSA:   PSA   Date Value Ref Range Status   10/29/2020 0.84 0 - 4 ug/L Final     Comment:     Assay Method:  Chemiluminescence using Siemens Vista analyzer     Prostate Specific Antigen Screen   Date Value Ref Range Status   11/22/2021 1.13 0.00 - 4.00 ug/L Final       Reviewed orders with patient. Reviewed health maintenance and updated orders accordingly - Yes  Lab work is in process    Reviewed and updated as needed this visit by clinical staff                  Reviewed and updated as needed this visit by Provider                     Review of Systems  All other ROS negative    OBJECTIVE:   There were no vitals taken for this visit.  /79   Pulse 95   Temp 99.3  F (37.4  C) (Oral)   Resp 16   Ht 1.702 m (5' 7\")   Wt 76.9 kg (169 lb 9.6 oz)   SpO2 98%   BMI 26.56 kg/m      Physical Exam  GENERAL: healthy, alert and no distress  EYES: Eyes grossly normal to inspection, PERRL and conjunctivae and sclerae normal  HENT: ear canals and TM's normal, nose and mouth without ulcers or lesions  NECK: no adenopathy, no asymmetry, masses, or scars and thyroid normal to palpation  RESP: lungs clear to auscultation - no rales, rhonchi or wheezes  BREAST: normal without masses, tenderness or nipple discharge and no palpable axillary masses or adenopathy  CV: regular rate and rhythm, normal S1 S2, no S3 or S4, no murmur, click or rub, no peripheral " edema and peripheral pulses strong  ABDOMEN: soft, nontender, no hepatosplenomegaly, no masses and bowel sounds normal  ABDOMEN: soft, mild epigastric tenderness, without hepatosplenomegaly or masses and bowel sounds normal  RECTAL (male): patient deferred GI/rectal exams  MS: no gross musculoskeletal defects noted, no edema  SKIN: no suspicious lesions or rashes  NEURO: Normal strength and tone, mentation intact and speech normal  PSYCH: mentation appears normal, affect normal/bright  LYMPH: no cervical, supraclavicular, axillary, or inguinal adenopathy    ASSESSMENT/PLAN:   ASSESSMENT / PLAN:  (Z00.00) Routine general medical examination at a health care facility  (primary encounter diagnosis)  Comment: generally healthy and normal exam except below  Plan: Reviewed self mole/testicle check handout.  Low carb diet/exercise. VitaminD.     (R10.13) Abdominal pain, epigastric  Comment: coliitis/pancreatitis/stone/IBS?  Plan: CBC with platelets, Hepatic panel (Albumin,         ALT, AST, Bili, Alk Phos, TP), Lipase, Adult GI         Referral - Consult Only, US Abdomen         Complete        Follow-up GI. Repeat antibiiotics if needed. To ER if worse. Consider surgery input too. Expected course and warning signs reviewed. Call/email with questions/concerns. Lower carb diet/avoid ALCOHOL and pop    (I10) Essential hypertension  Comment: stable  Plan: amLODIPine (NORVASC) 5 MG tablet        Continue self monitor. Chest pain or shortness of breath to er. Continue exdercise    (I10) Hypertension goal BP (blood pressure) < 140/9  Plan: metoprolol succinate ER (TOPROL XL) 50 MG 24 hr        tablet            (R06.83) Snoring  Comment: possible mera  Plan: Adult Sleep Eval & Management          Referral        Follow-up sleep study.         Patient has been advised of split billing requirements and indicates understanding: Yes      COUNSELING:   Reviewed preventive health counseling, as reflected in patient  "instructions       Regular exercise       Healthy diet/nutrition       Vision screening       Alcohol Use        Colorectal cancer screening       Prostate cancer screening       Osteoporosis prevention/bone health    Estimated body mass index is 27.39 kg/m  as calculated from the following:    Height as of 4/8/22: 1.721 m (5' 7.75\").    Weight as of 4/8/22: 81.1 kg (178 lb 12.8 oz).     Weight management plan: Discussed healthy diet and exercise guidelines    He reports that he has never smoked. He has quit using smokeless tobacco.      Counseling Resources:  ATP IV Guidelines  Pooled Cohorts Equation Calculator  FRAX Risk Assessment  ICSI Preventive Guidelines  Dietary Guidelines for Americans, 2010  USDA's MyPlate  ASA Prophylaxis  Lung CA Screening    Rj Mejia MD  [facility]  "

## 2022-11-15 ENCOUNTER — TELEPHONE (OUTPATIENT)
Dept: FAMILY MEDICINE | Facility: CLINIC | Age: 52
End: 2022-11-15

## 2022-11-28 ENCOUNTER — ANCILLARY PROCEDURE (OUTPATIENT)
Dept: ULTRASOUND IMAGING | Facility: CLINIC | Age: 52
End: 2022-11-28
Attending: FAMILY MEDICINE
Payer: COMMERCIAL

## 2022-11-28 DIAGNOSIS — R10.13 ABDOMINAL PAIN, EPIGASTRIC: ICD-10-CM

## 2022-11-28 PROCEDURE — 76700 US EXAM ABDOM COMPLETE: CPT | Mod: TC | Performed by: RADIOLOGY

## 2023-01-08 ENCOUNTER — HEALTH MAINTENANCE LETTER (OUTPATIENT)
Age: 53
End: 2023-01-08

## 2023-01-10 ENCOUNTER — OFFICE VISIT (OUTPATIENT)
Dept: GASTROENTEROLOGY | Facility: CLINIC | Age: 53
End: 2023-01-10
Attending: FAMILY MEDICINE
Payer: COMMERCIAL

## 2023-01-10 VITALS
TEMPERATURE: 97.3 F | BODY MASS INDEX: 27.31 KG/M2 | WEIGHT: 174 LBS | HEIGHT: 67 IN | SYSTOLIC BLOOD PRESSURE: 150 MMHG | DIASTOLIC BLOOD PRESSURE: 92 MMHG

## 2023-01-10 DIAGNOSIS — R19.7 VOMITING AND DIARRHEA: Primary | ICD-10-CM

## 2023-01-10 DIAGNOSIS — K70.0 FATTY LIVER, ALCOHOLIC: ICD-10-CM

## 2023-01-10 DIAGNOSIS — R11.10 VOMITING AND DIARRHEA: Primary | ICD-10-CM

## 2023-01-10 DIAGNOSIS — R10.32 BILATERAL LOWER ABDOMINAL PAIN: ICD-10-CM

## 2023-01-10 DIAGNOSIS — R10.31 BILATERAL LOWER ABDOMINAL PAIN: ICD-10-CM

## 2023-01-10 DIAGNOSIS — R63.4 WEIGHT LOSS: ICD-10-CM

## 2023-01-10 LAB
ALBUMIN SERPL-MCNC: 3.6 G/DL (ref 3.4–5)
ALBUMIN UR-MCNC: NEGATIVE MG/DL
ALP SERPL-CCNC: 93 U/L (ref 40–150)
ALT SERPL W P-5'-P-CCNC: 70 U/L (ref 0–70)
ANION GAP SERPL CALCULATED.3IONS-SCNC: 3 MMOL/L (ref 3–14)
APPEARANCE UR: CLEAR
AST SERPL W P-5'-P-CCNC: 58 U/L (ref 0–45)
BILIRUB SERPL-MCNC: 0.7 MG/DL (ref 0.2–1.3)
BILIRUB UR QL STRIP: NEGATIVE
BUN SERPL-MCNC: 10 MG/DL (ref 7–30)
CALCIUM SERPL-MCNC: 9.4 MG/DL (ref 8.5–10.1)
CHLORIDE BLD-SCNC: 104 MMOL/L (ref 94–109)
CO2 SERPL-SCNC: 30 MMOL/L (ref 20–32)
COLOR UR AUTO: YELLOW
CREAT SERPL-MCNC: 0.86 MG/DL (ref 0.66–1.25)
CRP SERPL-MCNC: 14.5 MG/L (ref 0–8)
ERYTHROCYTE [DISTWIDTH] IN BLOOD BY AUTOMATED COUNT: 13.1 % (ref 10–15)
GFR SERPL CREATININE-BSD FRML MDRD: >90 ML/MIN/1.73M2
GGT SERPL-CCNC: 171 U/L (ref 0–75)
GLUCOSE BLD-MCNC: 159 MG/DL (ref 70–99)
GLUCOSE UR STRIP-MCNC: NEGATIVE MG/DL
HCT VFR BLD AUTO: 44.2 % (ref 40–53)
HGB BLD-MCNC: 14.6 G/DL (ref 13.3–17.7)
HGB UR QL STRIP: NEGATIVE
KETONES UR STRIP-MCNC: NEGATIVE MG/DL
LEUKOCYTE ESTERASE UR QL STRIP: NEGATIVE
MCH RBC QN AUTO: 30.2 PG (ref 26.5–33)
MCHC RBC AUTO-ENTMCNC: 33 G/DL (ref 31.5–36.5)
MCV RBC AUTO: 91 FL (ref 78–100)
NITRATE UR QL: NEGATIVE
PH UR STRIP: 6 [PH] (ref 5–7)
PLATELET # BLD AUTO: 173 10E3/UL (ref 150–450)
POTASSIUM BLD-SCNC: 4.3 MMOL/L (ref 3.4–5.3)
PROT SERPL-MCNC: 7.5 G/DL (ref 6.8–8.8)
RBC # BLD AUTO: 4.84 10E6/UL (ref 4.4–5.9)
SODIUM SERPL-SCNC: 137 MMOL/L (ref 133–144)
SP GR UR STRIP: 1.01 (ref 1–1.03)
UROBILINOGEN UR STRIP-MCNC: 2 MG/DL
WBC # BLD AUTO: 5.5 10E3/UL (ref 4–11)

## 2023-01-10 PROCEDURE — 86140 C-REACTIVE PROTEIN: CPT | Performed by: NURSE PRACTITIONER

## 2023-01-10 PROCEDURE — 82977 ASSAY OF GGT: CPT | Performed by: NURSE PRACTITIONER

## 2023-01-10 PROCEDURE — 99204 OFFICE O/P NEW MOD 45 MIN: CPT | Performed by: NURSE PRACTITIONER

## 2023-01-10 PROCEDURE — 81003 URINALYSIS AUTO W/O SCOPE: CPT | Performed by: NURSE PRACTITIONER

## 2023-01-10 PROCEDURE — 85027 COMPLETE CBC AUTOMATED: CPT | Performed by: NURSE PRACTITIONER

## 2023-01-10 PROCEDURE — 80053 COMPREHEN METABOLIC PANEL: CPT | Performed by: NURSE PRACTITIONER

## 2023-01-10 PROCEDURE — 36415 COLL VENOUS BLD VENIPUNCTURE: CPT | Performed by: NURSE PRACTITIONER

## 2023-01-10 RX ORDER — DICYCLOMINE HYDROCHLORIDE 10 MG/1
10 CAPSULE ORAL 4 TIMES DAILY PRN
Qty: 100 CAPSULE | Refills: 1 | Status: SHIPPED | OUTPATIENT
Start: 2023-01-10 | End: 2023-08-01

## 2023-01-10 RX ORDER — LOPERAMIDE HYDROCHLORIDE 2 MG/1
2 TABLET ORAL 4 TIMES DAILY PRN
Qty: 30 TABLET | Refills: 1 | Status: SHIPPED | OUTPATIENT
Start: 2023-01-10 | End: 2023-08-01

## 2023-01-10 ASSESSMENT — PAIN SCALES - GENERAL: PAINLEVEL: SEVERE PAIN (6)

## 2023-01-10 NOTE — PATIENT INSTRUCTIONS
It was a pleasure taking care of you today.  I've included a brief summary of our discussion and care plan from today's visit below.  Please review this information with your primary care provider.  ______________________________________________________________________    My recommendations are summarized as follows:    Lab work ordered and will be completed at the [facility]. Please check [email or web address] for lab report later today. You can collect your stool sample at any time and deliever to the closest [facility] lab.    2.  CT scan of abdomen/pelvis without contrast was ordered. This could be completed at your local [facility].    3.  I sent an order for colonoscopy to [facility]. You can change location when they call you to schedule the study.    4.  Start taking one tablet of loperamide in the morning. Also, take half of the tablet if needed after each consequent stool.     5. Dicyclome prescribed to take as needed for abdominal pain/spasms. Do not take more than 4 tablets in 24 hours.    6. You may want to try probiotic supplement. Discontinue if it makes your symptoms worse.    Return to GI Clinic in 2 months to review your progress.    ______________________________________________________________________    I suspect that you may have microscopic colitis but will wait for colonoscopy findings.    What is microscopic colitis?  Microscopic colitis is a condition that causes watery diarrhea. It involves the colon, which is another name for the large intestine. There are 2 types of microscopic colitis, lymphocytic colitis and collagenous colitis. Both types cause the same symptoms and are treated the same way.  Microscopic colitis happens when the colon gets inflamed. But doctors don't always know what makes the colon inflamed. In some cases, the condition seems to be caused by an infection in the digestive system. In other cases, it seems to be caused by medicines. For example, a group of  "medicines called nonsteroidal antiinflammatory drugs, or \"NSAIDs\" for short, sometimes causes microscopic colitis.  Microscopic colitis happens most often in adults age 45 and older. Although the symptoms are bothersome, the condition is not life-threatening. It does not lead to serious problems, like cancer.  What are the symptoms of microscopic colitis?  Microscopic colitis causes episodes of diarrhea that is watery and not bloody. Most people have 4 to 9 watery bowel movements a day, but some people have more. Diarrhea can last weeks to months.  People sometimes have other symptoms, too. These commonly include weight loss, belly pain, or feeling very tired.  Is there a test for microscopic colitis?  Yes. Your doctor or nurse will do an exam and order different tests to exclude other causes of diarrhea and diagnose microscopic colitis. These can include:  ?Blood tests  ?Lab tests on a sample of your bowel movement  ?Colonoscopy or flexible sigmoidoscopy - This is a procedure that lets the doctor look at the inside of your colon. The doctor will put a thin tube with a camera and light on the end into your anus and up into your rectum and colon . During this procedure, the doctor will do a test called a biopsy. For a biopsy, they will take small samples of tissue from your colon. Then another doctor will look at the samples under a microscope to check for microscopic colitis. A biopsy is the only test that can tell for sure whether you have microscopic colitis.  How is microscopic colitis treated?  Treatment depends on your individual situation. It usually involves one or more of the following:  ?Medicine changes - If your doctor thinks that your symptoms are caused by a medicine you take, they will recommend that you stop taking that medicine.  ?Anti-diarrhea medicines, such as loperamide (brand name: Imodium) - These medicines reduce the number of bowel movements you have.  ?A steroid medicine called budesonide " "(brand name: Entocort) - This is not the same as the steroids some athletes take illegally. This medicine helps reduce inflammation in the colon.    What are probiotics?  Probiotics are what many people call \"friendly bacteria\" or \"good bacteria.\" They are bacteria that live in the body and help it work well. Often, probiotics help defend the body from infections caused by unfriendly bacteria or other germs.  Probiotics get into your body on their own, so you can get benefits without doing or taking anything extra. But some people take pills that contain probiotics because they think the pills will help keep them healthy. Some people even take \"pre-biotics,\" which are pills that contain a form of food that probiotics like. The problem is, even though there is good proof that probiotics help the body, there is still no good proof that taking probiotic pills does any good.  Do probiotic pills help improve health?  A few studies have hinted that probiotic pills might improve health, but others show no benefit.  Right now, researchers are studying whether probiotics:  ?Can help fight or prevent infections in the stomach or intestines, including a serious infection called \"C. difficile\" (or \"C. diff\")  ?Can help with diarrhea, constipation, and some of the conditions that cause these symptoms, such as irritable bowel syndrome, ulcerative colitis, and Crohn's disease  ?Prevent or treat allergies, including a skin condition called eczema, which makes the skin itchy and flaky  ?Help prevent or fight infections in the vagina  Should I eat yogurt with \"active cultures\"?  Yogurt products that have \"active cultures\" have probiotics in them. If you like yogurt and can digest it normally, there is probably no harm in eating it. It's possible that eating yogurt will help your digestion and help keep you healthy. If nothing else, low-fat yogurt can be a part of a healthy diet.  Some people put plain yogurt directly into their " vagina to help prevent or treat infections in the vagina. Experts do not recommend doing this because studies have not proven that it actually helps.  What are the downsides to taking probiotics?  Probiotics are not regulated by the US Food and Drug Administration (the FDA) the way standard medicines are. That means that the companies that package probiotics don't have to prove that the ingredients listed on the label are actually in the bottle. In the end, you could buy a bottle that does not have what you think it has, so you could lose money. (Some probiotic pills are expensive.)  Even if you do find pills that contain what you think they do, there's a small chance the pills could do you harm. In particular, people with weak immune systems (for example, people on chemotherapy for cancer) should be extra careful. That's because probiotics could cause an infection.         ______________________________________________________________________    Who do I call with any questions after my visit?  Please be in touch if there are any further questions that arise following today's visit.  There are multiple ways to contact your gastroenterology care team.      During business hours, you may reach a Gastroenterology nurse at [phone], option 3.     To schedule or reschedule an appointment, please call [phone].   To schedule your lab work at [facility], please call [phone]    You can always send a secure message through [employer].  [employer] messages are answered by your nurse or doctor typically within 24 hours.  Please allow extra time on weekends and holidays.      For urgent/emergent questions after business hours, you may reach the on-call GI Fellow by contacting the [facility]  at [phone].    In order for your refill to be processed in a timely fashion, it is your responsibility to ensure you follow the recommendations from your provider regarding your  laboratory studies and follow up appointments.       How will I get the results of any tests ordered?    You will receive all of your results.  If you have signed up for [employer], any tests ordered at your visit will be available to you after your physician reviews them.  Typically this takes 1-2 weeks.  If there are urgent results that require a change in your care plan, your physician or nurse will call you to discuss the next steps.   What is [employer]Qhart?  [employer] is a secure way for you to access all of your healthcare records from the [facility].  It is a web based computer program, so you can sign on to it from any location.  It also allows you to send secure messages to your care team.  I recommend signing up for [employer] access if you have not already done so and are comfortable with using a computer.    How to I schedule a follow-up visit?  If you did not schedule a follow-up visit today, please call [phone] to schedule a follow-up office visit.      Sincerely,  MARLENY Elizabeth,  [facility],  Division of Gastroenterology   ([facility])

## 2023-01-10 NOTE — LETTER
"    1/10/2023         RE: Marcus Delacruz  [address] [address]        Dear Colleague,    Thank you for referring your patient, Marcus Delacruz, to the [facility]. Please see a copy of my visit note below.    Gastroenterology CLINIC VISIT, NEW PATIENT    CC/REFERRING PROVIDER: Rj Mejia  REASON FOR CONSULTATION: diarrhea, fatty liver    HPI: 52 year old male with PMH of HTN and CKD, presenting to GI clinic for evaluation and management of lower abdominal pain and diarrhea since about one year ago.  Patient recalls an acute onset- \"I woke up one morning with severe pain\", which prompted him to seek medical attention at the ER. CT scan of abdomen was suggestive for colitis of sigmoid colon and diverticulosis. Patient said that he has been having intermittent bouts of abdominal pain that are getting worse- pain is more severe and frequent now. Reported nausea, some bloating, and occasional emesis.  Said that he is afraid to eat. Lost about 15 lbs in the firstt 6 months since onset of his symptoms.  Patient reported his pain at 6 out of 10 since this morning.  Patient  Complains of changes to his bowel habits.  Has alternation of soft stools and diarrhea.  On his worst diarrhea days, he may have up to 12 mucousy or watery stools.  Noticed black stools at times, but no blood.  Does not take any medication to improve his symptoms.  He quit smoking approximately 30 years ago.  Admits to alcohol intake, approximately 7 drinks a week (beer).  Patient denies any illicit drug use.  Admits to family history of pancreatic cancer in father at age 68.    I reviewed the patient's health records.  Patient had colonoscopy in 2018, there was no signs of colitis. Noted presence of diverticula.  His last abdominal ultrasound was suggestive for hepatic steatosis and some irregularity of the posterior wall of the aorta, likely due to atherosclerosis. Does not appear like a dissection but " "a follow-up CT scan recommended if pain persists.    Laboratory work from November 2022 showed normal lipase, CBC, A1c, and electrolytes.  Patient had mildly elevated bilirubin at 0.5 and AST at 56.  Noted triglycerides at 599.    Abdominal Pain   Location: lower abdomen, left abdomen  Radiation:none    Pain character: throbbing with occasional stabbing  Severity: 6 on a scale of 1-10.    Duration: intermittent  Course of Illness: symptoms since one year ago, getting worse  Exacerbated by: food intake  Relieved by: \"nothing\", goes away on its own  Associated Symptoms: nausea, emesis, diarrhea, weight loss     ROS: 10pt ROS performed and otherwise negative.    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     Kidney stones 9/16/2011       PREVIOUS ABDOMINAL/GYNECOLOGIC SURGERIES:    Past Surgical History:   Procedure Laterality Date     EXTRACORPOREAL SHOCK WAVE LITHOTRIPSY (ESWL)  .3.4.2016     GENITOURINARY SURGERY  Kidney Stones    21 Lithotripsy procedures     HERNIA REPAIR  2017     JOINT REPLACEMENT Right 03/2020     ORTHOPEDIC SURGERY  2015    right shoulder rotor cuff     TONSILLECTOMY  2008         PERTINENT MEDICATIONS:  Current Outpatient Medications   Medication Sig Dispense Refill     amLODIPine (NORVASC) 5 MG tablet Take 1 tablet (5 mg) by mouth daily 90 tablet 3     dicyclomine (BENTYL) 10 MG capsule Take 1 capsule (10 mg) by mouth 4 times daily as needed (as needed for abdominal cramping) 100 capsule 1     loperamide (IMODIUM A-D) 2 MG tablet Take 1 tablet (2 mg) by mouth 4 times daily as needed for diarrhea Take one tablet after 2 or more loose stools or one watery stool. Then, take half of the tablet as needed for consequent loose stools 30 tablet 1     metoprolol succinate ER (TOPROL XL) 50 MG 24 hr tablet TAKE 1 TABLET AT BEDTIME FOR BLOOD PRESSURE 90 tablet 3     sildenafil " (REVATIO) 20 MG tablet 2-3 tabs daily as needed for ED 30 tablet 2     clindamycin (CLEOCIN) 300 MG capsule TAKE 2 CAPSULES BY MOUTH 1 HOUR PRIOR TO DENTAL APPOINTMENT. (Patient not taking: No sig reported) 2 capsule 1     No other OTC/herbal/supplements reported by patient.    SOCIAL HISTORY:  Social History     Socioeconomic History     Marital status:      Spouse name: Not on file     Number of children: Not on file     Years of education: Not on file     Highest education level: Not on file   Occupational History     Not on file   Tobacco Use     Smoking status: Never     Smokeless tobacco: Former     Tobacco comments:     NON SMOKING HOME   Vaping Use     Vaping Use: Never used   Substance and Sexual Activity     Alcohol use: Yes     Comment: OCC     Drug use: No     Sexual activity: Yes     Partners: Female     Birth control/protection: None   Other Topics Concern     Parent/sibling w/ CABG, MI or angioplasty before 65F 55M? No      Service No     Blood Transfusions No     Caffeine Concern No     Occupational Exposure No     Hobby Hazards No     Sleep Concern No     Stress Concern No     Weight Concern No     Special Diet No     Back Care No     Exercise No     Bike Helmet No     Seat Belt Yes     Self-Exams No   Social History Narrative     Not on file     Social Determinants of Health     Financial Resource Strain: Not on file   Food Insecurity: Not on file   Transportation Needs: Not on file   Physical Activity: Not on file   Stress: Not on file   Social Connections: Not on file   Intimate Partner Violence: Not on file   Housing Stability: Not on file       FAMILY HISTORY:  Denies colon/panc/esophageal/other GI CA, no other Luke or other HPS-related Breanna. No IBD/celiac, no other AI/liver/thyroid disease.    Family History   Problem Relation Age of Onset     Cancer Father 68        pancreatic cancer     Diabetes Father      Other Cancer Father        PHYSICAL EXAMINATION:  Vitals reviewed  BP  "(!) 150/92   Temp 97.3  F (36.3  C) (Temporal)   Ht 1.702 m (5' 7\")   Wt 78.9 kg (174 lb)   BMI 27.25 kg/m      General: Patient appears well in no acute distress.   Skin: No visualized rash or lesions on visualized skin  Eyes: EOMI, no erythema, sclera icterus or discharge noted  Resp: breathing comfortably without accessory muscle usage, speaking in full sentences, no cough  Lung sounds clear  Card: Regular and rhythmic S1 and S2. No murmur,gallop, or rub  Abdomen: Active bowel sounds X 4 quadrants. Limited assessment due to patient's discomfort. Pain on palpation over left abdomen, epigastrium and suprapubic area. Some guarding present but no rebound tenderness   MSK: Appears to have normal range of motion based on visualized movements  Neurologic: No apparent tremors, facial movements symmetric  Psych: affect normal, alert and oriented      PERTINENT STUDIES Reviewed in EMR    ASSESSMENT/PLAN:  Pleasant 52 year old male  presented  to GI clinic for evaluation and management of lower abdominal pain with associated diarrhea, nausea and vomiting, and episodes of black stools.  Patient reported up to 12 loose or mucousy stools a day on his worse days.  He denies any red blood per rectum.  Stated that his symptoms may go away for a month or so, when he would have normal formed stools and good appetite.  Admits to weight loss approximately 15pounds in the past year.  He tried \"bland diet\" but it did not make a big difference.  Admits to alcohol intake, approximately 7 drinks a week.  At onset of his symptoms approximately 1 year ago, patient's CT scan was suggestive for colitis of sigmoid colon and diverticulosis. He does not recall that any treatment was ordered but failed to follow up.  His last abdominal ultrasound showed hepatic steatosis and some irregularity of the aorta.  Laboratory work was unremarkable except of mild elevation in bilirubin and AST and high triglycerides at 599.    I discussed the " differential diagnosis with the patient.  Explained to the patient that we would need to proceed with colonoscopy and biopsy of the colonic mucosa in order to evaluate him for possible colitis.  I suggested to repeat CT scan of abdomen pelvis, while waiting for colonoscopy (> 30 days waiting list).  Will order stool studies to exclude infection and malabsorption.  We will repeat CBC and order CRP and hepatic panel.   Symptomatic management recommended.  Abstain alcohol. Avoid spicy foods, dairy, acidic fluids or foods. Will send prescription for loperamide to take 1 tablet in the morning, and repeat half a tablet after each consecutive stool as needed.  Patient may also try dicyclomine for abdominal spasms.  I explained to the patient that if he found to have colitis, his treatment will be adjusted accordingly.  Patient reports urinary frequency and suprapubic pain.  History of solitary kidney and right kidney stones.  UA was ordered.  Recommended to follow-up with primary care provider or urology if signs of hematuria or UTI.Will send [employer] message with lab report.      ICD-10-CM    1. Vomiting and diarrhea  R11.10 GGT    R19.7 Comprehensive metabolic panel (BMP + Alb, Alk Phos, ALT, AST, Total. Bili, TP)     CRP, inflammation     CBC with platelets     Elastase Fecal     Calprotectin Feces     loperamide (IMODIUM A-D) 2 MG tablet     dicyclomine (BENTYL) 10 MG capsule     CT Abdomen Pelvis w/o Contrast     Colonoscopy Screening  Referral     CANCELED: Colonoscopy Screening  Referral      2. Bilateral lower abdominal pain  R10.31 Adult GI  Referral - Consult Only    R10.32 UA Macro with Reflex to Micro and Culture - lab collect     GGT     Comprehensive metabolic panel (BMP + Alb, Alk Phos, ALT, AST, Total. Bili, TP)     CRP, inflammation     CBC with platelets     Elastase Fecal     Calprotectin Feces     loperamide (IMODIUM A-D) 2 MG tablet     dicyclomine (BENTYL) 10 MG capsule      CT Abdomen Pelvis w/o Contrast     Colonoscopy Screening  Referral     CANCELED: Colonoscopy Screening  Referral      3. Weight loss  R63.4 GGT     Comprehensive metabolic panel (BMP + Alb, Alk Phos, ALT, AST, Total. Bili, TP)     CRP, inflammation     CBC with platelets     Elastase Fecal     Calprotectin Feces     loperamide (IMODIUM A-D) 2 MG tablet     CT Abdomen Pelvis w/o Contrast     Colonoscopy Screening  Referral     CANCELED: Colonoscopy Screening  Referral            RTC in 2 months  Thank you for this consultation. It was a pleasure to participate in the care of this patient; please contact us with any further questions.    CARLOS Elizabeth, FNP-C  [facility]  Gastroenterology Department  [place], [place]    This note was created with Dragon voice recognition software, and while reviewed for accuracy, inadvertent minor typographic errors may occur. Please contact the provider if you have any questions.      Again, thank you for allowing me to participate in the care of your patient.        Sincerely,        CARLOS ELIZABETH CNP

## 2023-01-10 NOTE — PROGRESS NOTES
"Gastroenterology CLINIC VISIT, NEW PATIENT    CC/REFERRING PROVIDER: Rj Mejia  REASON FOR CONSULTATION: diarrhea, fatty liver    HPI: 52 year old male with PMH of HTN and CKD, presenting to GI clinic for evaluation and management of lower abdominal pain and diarrhea since about one year ago.  Patient recalls an acute onset- \"I woke up one morning with severe pain\", which prompted him to seek medical attention at the ER. CT scan of abdomen was suggestive for colitis of sigmoid colon and diverticulosis. Patient said that he has been having intermittent bouts of abdominal pain that are getting worse- pain is more severe and frequent now. Reported nausea, some bloating, and occasional emesis.  Said that he is afraid to eat. Lost about 15 lbs in the firstt 6 months since onset of his symptoms.  Patient reported his pain at 6 out of 10 since this morning.  Patient  Complains of changes to his bowel habits.  Has alternation of soft stools and diarrhea.  On his worst diarrhea days, he may have up to 12 mucousy or watery stools.  Noticed black stools at times, but no blood.  Does not take any medication to improve his symptoms.  He quit smoking approximately 30 years ago.  Admits to alcohol intake, approximately 7 drinks a week (beer).  Patient denies any illicit drug use.  Admits to family history of pancreatic cancer in father at age 68.    I reviewed the patient's health records.  Patient had colonoscopy in 2018, there was no signs of colitis. Noted presence of diverticula.  His last abdominal ultrasound was suggestive for hepatic steatosis and some irregularity of the posterior wall of the aorta, likely due to atherosclerosis. Does not appear like a dissection but a follow-up CT scan recommended if pain persists.    Laboratory work from November 2022 showed normal lipase, CBC, A1c, and electrolytes.  Patient had mildly elevated bilirubin at 0.5 and AST at 56.  Noted triglycerides at 599.    Abdominal Pain " "  Location: lower abdomen, left abdomen  Radiation:none    Pain character: throbbing with occasional stabbing  Severity: 6 on a scale of 1-10.    Duration: intermittent  Course of Illness: symptoms since one year ago, getting worse  Exacerbated by: food intake  Relieved by: \"nothing\", goes away on its own  Associated Symptoms: nausea, emesis, diarrhea, weight loss     ROS: 10pt ROS performed and otherwise negative.    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     Kidney stones 9/16/2011       PREVIOUS ABDOMINAL/GYNECOLOGIC SURGERIES:    Past Surgical History:   Procedure Laterality Date     EXTRACORPOREAL SHOCK WAVE LITHOTRIPSY (ESWL)  .3.4.2016     GENITOURINARY SURGERY  Kidney Stones    21 Lithotripsy procedures     HERNIA REPAIR  2017     JOINT REPLACEMENT Right 03/2020     ORTHOPEDIC SURGERY  2015    right shoulder rotor cuff     TONSILLECTOMY  2008         PERTINENT MEDICATIONS:  Current Outpatient Medications   Medication Sig Dispense Refill     amLODIPine (NORVASC) 5 MG tablet Take 1 tablet (5 mg) by mouth daily 90 tablet 3     dicyclomine (BENTYL) 10 MG capsule Take 1 capsule (10 mg) by mouth 4 times daily as needed (as needed for abdominal cramping) 100 capsule 1     loperamide (IMODIUM A-D) 2 MG tablet Take 1 tablet (2 mg) by mouth 4 times daily as needed for diarrhea Take one tablet after 2 or more loose stools or one watery stool. Then, take half of the tablet as needed for consequent loose stools 30 tablet 1     metoprolol succinate ER (TOPROL XL) 50 MG 24 hr tablet TAKE 1 TABLET AT BEDTIME FOR BLOOD PRESSURE 90 tablet 3     sildenafil (REVATIO) 20 MG tablet 2-3 tabs daily as needed for ED 30 tablet 2     clindamycin (CLEOCIN) 300 MG capsule TAKE 2 CAPSULES BY MOUTH 1 HOUR PRIOR TO DENTAL APPOINTMENT. (Patient not taking: No sig reported) 2 capsule 1     No other " "OTC/herbal/supplements reported by patient.    SOCIAL HISTORY:  Social History     Socioeconomic History     Marital status:      Spouse name: Not on file     Number of children: Not on file     Years of education: Not on file     Highest education level: Not on file   Occupational History     Not on file   Tobacco Use     Smoking status: Never     Smokeless tobacco: Former     Tobacco comments:     NON SMOKING HOME   Vaping Use     Vaping Use: Never used   Substance and Sexual Activity     Alcohol use: Yes     Comment: OCC     Drug use: No     Sexual activity: Yes     Partners: Female     Birth control/protection: None   Other Topics Concern     Parent/sibling w/ CABG, MI or angioplasty before 65F 55M? No      Service No     Blood Transfusions No     Caffeine Concern No     Occupational Exposure No     Hobby Hazards No     Sleep Concern No     Stress Concern No     Weight Concern No     Special Diet No     Back Care No     Exercise No     Bike Helmet No     Seat Belt Yes     Self-Exams No   Social History Narrative     Not on file     Social Determinants of Health     Financial Resource Strain: Not on file   Food Insecurity: Not on file   Transportation Needs: Not on file   Physical Activity: Not on file   Stress: Not on file   Social Connections: Not on file   Intimate Partner Violence: Not on file   Housing Stability: Not on file       FAMILY HISTORY:  Denies colon/panc/esophageal/other GI CA, no other Luke or other HPS-related Breanna. No IBD/celiac, no other AI/liver/thyroid disease.    Family History   Problem Relation Age of Onset     Cancer Father 68        pancreatic cancer     Diabetes Father      Other Cancer Father        PHYSICAL EXAMINATION:  Vitals reviewed  BP (!) 150/92   Temp 97.3  F (36.3  C) (Temporal)   Ht 1.702 m (5' 7\")   Wt 78.9 kg (174 lb)   BMI 27.25 kg/m      General: Patient appears well in no acute distress.   Skin: No visualized rash or lesions on visualized skin  Eyes: " "EOMI, no erythema, sclera icterus or discharge noted  Resp: breathing comfortably without accessory muscle usage, speaking in full sentences, no cough  Lung sounds clear  Card: Regular and rhythmic S1 and S2. No murmur,gallop, or rub  Abdomen: Active bowel sounds X 4 quadrants. Limited assessment due to patient's discomfort. Pain on palpation over left abdomen, epigastrium and suprapubic area. Some guarding present but no rebound tenderness   MSK: Appears to have normal range of motion based on visualized movements  Neurologic: No apparent tremors, facial movements symmetric  Psych: affect normal, alert and oriented      PERTINENT STUDIES Reviewed in EMR    ASSESSMENT/PLAN:  Pleasant 52 year old male  presented  to GI clinic for evaluation and management of lower abdominal pain with associated diarrhea, nausea and vomiting, and episodes of black stools.  Patient reported up to 12 loose or mucousy stools a day on his worse days.  He denies any red blood per rectum.  Stated that his symptoms may go away for a month or so, when he would have normal formed stools and good appetite.  Admits to weight loss approximately 15pounds in the past year.  He tried \"bland diet\" but it did not make a big difference.  Admits to alcohol intake, approximately 7 drinks a week.  At onset of his symptoms approximately 1 year ago, patient's CT scan was suggestive for colitis of sigmoid colon and diverticulosis. He does not recall that any treatment was ordered but failed to follow up.  His last abdominal ultrasound showed hepatic steatosis and some irregularity of the aorta.  Laboratory work was unremarkable except of mild elevation in bilirubin and AST and high triglycerides at 599.    I discussed the differential diagnosis with the patient.  Explained to the patient that we would need to proceed with colonoscopy and biopsy of the colonic mucosa in order to evaluate him for possible colitis.  I suggested to repeat CT scan of abdomen " pelvis, while waiting for colonoscopy (> 30 days waiting list).  Will order stool studies to exclude infection and malabsorption.  We will repeat CBC and order CRP and hepatic panel.   Symptomatic management recommended.  Abstain alcohol. Avoid spicy foods, dairy, acidic fluids or foods. Will send prescription for loperamide to take 1 tablet in the morning, and repeat half a tablet after each consecutive stool as needed.  Patient may also try dicyclomine for abdominal spasms.  I explained to the patient that if he found to have colitis, his treatment will be adjusted accordingly.  Patient reports urinary frequency and suprapubic pain.  History of solitary kidney and right kidney stones.  UA was ordered.  Recommended to follow-up with primary care provider or urology if signs of hematuria or UTI.Will send [facility] message with lab report.      ICD-10-CM    1. Vomiting and diarrhea  R11.10 GGT    R19.7 Comprehensive metabolic panel (BMP + Alb, Alk Phos, ALT, AST, Total. Bili, TP)     CRP, inflammation     CBC with platelets     Elastase Fecal     Calprotectin Feces     loperamide (IMODIUM A-D) 2 MG tablet     dicyclomine (BENTYL) 10 MG capsule     CT Abdomen Pelvis w/o Contrast     Colonoscopy Screening  Referral     CANCELED: Colonoscopy Screening  Referral      2. Bilateral lower abdominal pain  R10.31 Adult GI  Referral - Consult Only    R10.32 UA Macro with Reflex to Micro and Culture - lab collect     GGT     Comprehensive metabolic panel (BMP + Alb, Alk Phos, ALT, AST, Total. Bili, TP)     CRP, inflammation     CBC with platelets     Elastase Fecal     Calprotectin Feces     loperamide (IMODIUM A-D) 2 MG tablet     dicyclomine (BENTYL) 10 MG capsule     CT Abdomen Pelvis w/o Contrast     Colonoscopy Screening  Referral     CANCELED: Colonoscopy Screening  Referral      3. Weight loss  R63.4 GGT     Comprehensive metabolic panel (BMP + Alb, Alk Phos, ALT, AST, Total.  Bili, TP)     CRP, inflammation     CBC with platelets     Elastase Fecal     Calprotectin Feces     loperamide (IMODIUM A-D) 2 MG tablet     CT Abdomen Pelvis w/o Contrast     Colonoscopy Screening  Referral     CANCELED: Colonoscopy Screening  Referral            RTC in 2 months  Thank you for this consultation. It was a pleasure to participate in the care of this patient; please contact us with any further questions.    CARLOS Elizabeth, FNP-C  [facility]  Gastroenterology Department  [place], [place]    This note was created with Dragon voice recognition software, and while reviewed for accuracy, inadvertent minor typographic errors may occur. Please contact the provider if you have any questions.

## 2023-01-16 ENCOUNTER — ANCILLARY PROCEDURE (OUTPATIENT)
Dept: CT IMAGING | Facility: CLINIC | Age: 53
End: 2023-01-16
Attending: NURSE PRACTITIONER
Payer: COMMERCIAL

## 2023-01-16 DIAGNOSIS — R10.31 BILATERAL LOWER ABDOMINAL PAIN: ICD-10-CM

## 2023-01-16 DIAGNOSIS — R63.4 WEIGHT LOSS: ICD-10-CM

## 2023-01-16 DIAGNOSIS — R10.32 BILATERAL LOWER ABDOMINAL PAIN: ICD-10-CM

## 2023-01-16 DIAGNOSIS — R19.7 VOMITING AND DIARRHEA: ICD-10-CM

## 2023-01-16 DIAGNOSIS — R11.10 VOMITING AND DIARRHEA: ICD-10-CM

## 2023-01-16 PROCEDURE — 74176 CT ABD & PELVIS W/O CONTRAST: CPT | Mod: TC | Performed by: RADIOLOGY

## 2023-01-17 DIAGNOSIS — K57.92 ACUTE DIVERTICULITIS: Primary | ICD-10-CM

## 2023-01-17 RX ORDER — CIPROFLOXACIN 500 MG/1
500 TABLET, FILM COATED ORAL 2 TIMES DAILY
Qty: 14 TABLET | Refills: 0 | Status: SHIPPED | OUTPATIENT
Start: 2023-01-17 | End: 2023-01-24

## 2023-01-17 RX ORDER — METRONIDAZOLE 500 MG/1
500 TABLET ORAL 3 TIMES DAILY
Qty: 21 TABLET | Refills: 0 | Status: SHIPPED | OUTPATIENT
Start: 2023-01-17 | End: 2023-01-24

## 2023-01-18 ENCOUNTER — TELEPHONE (OUTPATIENT)
Dept: FAMILY MEDICINE | Facility: CLINIC | Age: 53
End: 2023-01-18
Payer: COMMERCIAL

## 2023-01-18 ENCOUNTER — TELEPHONE (OUTPATIENT)
Dept: GASTROENTEROLOGY | Facility: CLINIC | Age: 53
End: 2023-01-18
Payer: COMMERCIAL

## 2023-01-18 DIAGNOSIS — K52.9 COLITIS: Primary | ICD-10-CM

## 2023-01-18 DIAGNOSIS — K57.32 DIVERTICULITIS OF COLON: ICD-10-CM

## 2023-01-18 NOTE — TELEPHONE ENCOUNTER
We received an rx for Ciprofloxacin for this patient and he has a Levaquin allergy listed.  Please contact pharmacy to discuss med.    Thank You  Eufemia Peters, [facility]  [phone]

## 2023-01-18 NOTE — TELEPHONE ENCOUNTER
Rashmi Colmenares APRN CNP Hovde, Lona, RN  Caller: Unspecified (Today,  9:02 AM)  Lisandra,   Would you please verify with the patient if he truly had an allergic reaction to Levaquin? What type of reaction?   To treat his diverticulitis, I have 2 options- Augmentin and Cipro and both meds are on the allergy list.   If he had hives or anaphylaxis, then cipro won't be prescribed. Will be monitoring resolution of diverticulitis without antibiotic therapy.   Thanks

## 2023-01-18 NOTE — TELEPHONE ENCOUNTER
Spoke with pharmacy at [facility]. Patient reported to them that he was taking cipro before without adverse reaction.   Will proceed with diverticulitis antibiotic treatment (cipro+flagyl).  Lisandra, would you please call the patient tomorrow morning and see how if he doing? Please instruct to take 50 mg of benadryl and proceed to ER if develops hives or any other allergic reaction (GI side effects are expected).  Thanks      Patient returned call.  Levaquin is on allergy list related to hives.  He reports needing medication treatment related to the hives.     Lisandra Beach RN

## 2023-01-18 NOTE — TELEPHONE ENCOUNTER
"    Screening Questions  BLUE  KIND OF PREP RED  LOCATION [review exclusion criteria] GREEN  SEDATION TYPE        Y Are you active on mychart?       [facility]ENTRELL REICH Ordering/Referring Provider?        [facility] What type of coverage do you have?      N Have you had a positive covid test in the last 14 days?     27.25 1. BMI  [BMI 40+ - review exclusion criteria]    Y  2. Are you able to give consent for your medical care? [IF NO,RN REVIEW]          N  3. Are you taking any prescription pain medications on a routine schedule   (ex narcotics: tramadol, oxycodone, roxicodone, oxycontin,  and percocet)?          3a. EXTENDED PREP What kind of prescription?     N 4. Do you have any chemical dependencies such as alcohol, street drugs, or methadone?        **If yes 3- 5 , please schedule with MAC sedation.**          IF YES TO ANY 6 - 10 - HOSPITAL SETTING ONLY.     N 6.   Do you need assistance transferring?     N 7.   Have you had a heart or lung transplant?    N 8.   Are you currently on dialysis?   N 9.   Do you use daily home oxygen?   N 10. Do you take nitroglycerin?   10a.  If yes, how often?     11. [FEMALES]  N Are you currently pregnant?    11a. N If yes, how many weeks? [ Greater than 12 weeks, OR NEEDED]    N 12. Do you have Pulmonary Hypertension? *NEED PAC APPT AT [facility]*     N 13. [review exclusion criteria]  Do you have any implantable devices in your body (pacemaker, defib, LVAD)?    N 14. In the past 6 months, have you had any heart related issues including cardiomyopathy or heart attack?     14a.  If yes, did it require cardiac stenting if so when?     N 15. Have you had a stroke or Transient ischemic attack (TIA - aka  mini stroke ) within 6 months?      N 16. Do you have mod to severe Obstructive Sleep Apnea?  [Hospital only]    N 17. Do you have SEVERE AND UNCONTROLLED asthma? *NEED PAC APPT AT [facility]*     N 18. Are you currently taking any blood thinners?     18a. If yes, inform patient to \"follow up w/ " "ordering provider for bridging instructions.\"    N 19. Do you take the medication Phentermine?    19a. If yes, \"Hold for 7 days before procedure.  Please consult your prescribing provider if you have questions about holding this medication.\"     N  20. Do you have chronic kidney disease?      N  21. Do you have a diagnosis of diabetes?     N  22. On a regular basis do you go 3-5 days between bowel movements?      23. Preferred LOCAL Pharmacy for Pre Prescription    [ LIST ONLY ONE PHARMACY]        [place], [place] - [address] [facility], [address]          - CLOSING REMINDERS -    Informed patient they will need an adult    Cannot take any type of public or medical transportation alone    Conscious Sedation- Needs  for 6 hours after the procedure       MAC/General-Needs  for 24 hours after procedure    Pre-Procedure Covid test to be completed [[facility] PCR Testing Required]    Confirmed Nurse will call to complete assessment       - SCHEDULING DETAILS -  NO Hospital Setting Required? If yes, what is the exclusion?:    [facility]  Surgeon    1/26/2023  Date of Procedure  Lower Endoscopy [Colonoscopy]  Type of Procedure Scheduled  [facility]   STANDARD [facility]-If you answer yes to questions #8, #20, #21Which Colonoscopy Prep was Sent?     MODERATE Sedation Type     NO PAC / Pre-op Required                 "

## 2023-01-19 ENCOUNTER — TELEPHONE (OUTPATIENT)
Dept: GASTROENTEROLOGY | Facility: CLINIC | Age: 53
End: 2023-01-19

## 2023-01-19 DIAGNOSIS — R10.84 ABDOMINAL PAIN, GENERALIZED: Primary | ICD-10-CM

## 2023-01-19 DIAGNOSIS — K92.1 BLOODY STOOLS: ICD-10-CM

## 2023-01-19 RX ORDER — BISACODYL 5 MG/1
TABLET, DELAYED RELEASE ORAL
Qty: 4 TABLET | Refills: 0 | Status: SHIPPED | OUTPATIENT
Start: 2023-01-19 | End: 2023-03-07

## 2023-01-19 NOTE — TELEPHONE ENCOUNTER
Patient called back and writer informed him of message below that he should wait for at least 6 weeks to have colonoscopy.  Patient understood and was transferred to scheduling to reschedule.   Isabel Pelletier RN

## 2023-01-19 NOTE — TELEPHONE ENCOUNTER
Call placed to follow up on antibiotic, verify if patient picked up from pharmacy.  Message left.     Lisandra Beach RN

## 2023-01-19 NOTE — TELEPHONE ENCOUNTER
The patient is currently being treated for acute diverticulitis.  The patient had an office visit on 1/10/23, antibiotics were issued on 1/17/23.  It sounds like they want to work the patient up for colitis.  We will send staff message over to scoping provider to review.  Typically colonoscopies are not performed during active diverticulitis flare.  The patient was not advised to wait until after treatment to have the colonoscopy done.  Need approval on this.  Staff message sent.     Patient scheduled for Colonoscopy  on 1/26/26.     Discuss Covid policy.     Pre op exam scheduled: N/A    Arrival time: 1:40pm    Facility location: [facility]; [address], [place] [address]    Sedation type: Conscious sedation     Anticoagulations? No    Electronic implanted devices? No    Diabetic? No    Indication for procedure: diarrhea, lower abdominal pain, weight loss, bloody stools intermit. X one year. Colitis? IBD?    Bowel prep recommendation: Standard Golytely     Prep instructions sent via [employer] Bowel prep script sent to [place], [place] - [address], [address]    Pre visit planning completed.    Izzy Momin RN  Endoscopy Procedure Pre Assessment RN

## 2023-01-19 NOTE — TELEPHONE ENCOUNTER
"Called the Pt to discuss below. No answer, LM.       Per staff message:    Kj Gama MD Zilbauer, Rachel M, RN; P Mg Rn Gi Procedures  Recommend rescheduling for >6 weeks from now to allow diverticulitis to resolve.   Thanks,Kj Gama MD           Previous Messages     ----- Message -----   From: Izzy Momin RN   Sent: 1/19/2023  10:13 AM CST   To: Kj Gama MD, *   Subject: Case Review                                       Please review and advise to determine if patient can proceed with scheduled procedure.     Patient is scheduled for a Colonoscopy   Date of procedure: 1/26/23   Indication: \"diarrhea, lower abdominal pain, weight loss, bloody stools intermit. X one year. Colitis? IBD?\"   Sedation type: Conscious sedation     Reason for review: The patient is currently being treated for acute diverticulitis.  Patient had an office visit on 1/10/23, antibiotics were prescribed on 1/17/23.  The patient was not advised to wait to have colonoscopy done until after course of treatment is completed.  Please advise on this procedure.  Does this need to wait until after treatment is completed?  We typically advise the patient to wait at least 14 days after starting antibiotics.       Thank you,   Izzy Momin RN   Endoscopy Procedure Pre Assessment RN   [phone] option 4                "

## 2023-01-20 RX ORDER — AZITHROMYCIN 250 MG/1
1000 TABLET, FILM COATED ORAL ONCE
Qty: 4 TABLET | Refills: 0 | Status: SHIPPED | OUTPATIENT
Start: 2023-01-20 | End: 2023-01-20

## 2023-01-20 NOTE — TELEPHONE ENCOUNTER
Patient is allergic to it. Says that he was supposed to get something different? Please call him back.

## 2023-01-20 NOTE — TELEPHONE ENCOUNTER
Empiric treatment with one doze of 1 gram azithromycin and 7 days of metronidazole as prescribed due to pt's allergy to cipro and amoxicillin.  Thanks

## 2023-01-20 NOTE — TELEPHONE ENCOUNTER
Call placed, message left to return call.  Following up on if patient got medication from pharmacy.      Lisandra Beach Rn

## 2023-01-20 NOTE — TELEPHONE ENCOUNTER
Patient has not picked up Flagyl yet, he will pick this up today.  Will add Azithromycin 1gm to treatment plan.  Patient is aware of new medication.      Reports symptoms are starting over the past few days, abdominal pain, loose stools.     Lisandra Beach Rn

## 2023-01-23 ENCOUNTER — TELEPHONE (OUTPATIENT)
Dept: GASTROENTEROLOGY | Facility: CLINIC | Age: 53
End: 2023-01-23
Payer: COMMERCIAL

## 2023-01-23 NOTE — TELEPHONE ENCOUNTER
"Caller: Derick  Reason for Reschedule/Cancellation (please be detailed, any staff messages or encounters to note?): Alisia Pelletier RN  P Endoscopy Scheduling Pool  Good Morning,   This patient did call back and I did relay message below to him.  I transferred him to r/s but it is noted he is still on the schedule.  Would someone be able to reach out to him and get him rescheduled.     Thank you,   Isabel JC             Per staff message:       Kj Gama MD Zilbauer, Rachel M, RN; SASHA Plata Rn Gi Procedures   Recommend rescheduling for >6 weeks from now to allow diverticulitis to resolve.   Thanks,Kj Gama MD             Previous Messages       ----- Message -----   From: Izzy Momin RN   Sent: 1/19/2023  10:13 AM CST   To: Kj Gama MD, *   Subject: Case Review                                       Please review and advise to determine if patient can proceed with scheduled procedure.     Patient is scheduled for a Colonoscopy   Date of procedure: 1/26/23   Indication: \"diarrhea, lower abdominal pain, weight loss, bloody stools intermit. X one year. Colitis? IBD?\"   Sedation type: Conscious sedation     Reason for review: The patient is currently being treated for acute diverticulitis.  Patient had an office visit on 1/10/23, antibiotics were prescribed on 1/17/23.  The patient was not advised to wait to have colonoscopy done until after course of treatment is completed.  Please advise on this procedure.  Does this need to wait until after treatment is completed?  We typically advise the patient to wait at least 14 days after starting antibiotics.       Thank you,   Izzy Momin RN   Endoscopy Procedure Pre Assessment RN   [phone] option 4          Prior to reschedule please review:    Ordering Provider:Rashmi Colmenares    Sedation per order:moderate    Does patient have any ASC Exclusions, please identify?: n      Notes on Cancelled " Procedure:    Procedure:Lower Endoscopy [Colonoscopy]     Date: 1/26/23    Location:[facility] [place]; [address], [place] [address]    Surgeon: Lanette        Rescheduled: No  I left message for patient to call back and reschedule per message above and put case in [place]

## 2023-01-27 NOTE — TELEPHONE ENCOUNTER
I did get a hold of Marcus today and we have him rescheduled to 3/21/23 with  in  and he wanted to have Mac for his procedure.

## 2023-02-17 DIAGNOSIS — I10 HYPERTENSION GOAL BP (BLOOD PRESSURE) < 140/90: ICD-10-CM

## 2023-02-19 RX ORDER — METOPROLOL SUCCINATE 50 MG/1
TABLET, EXTENDED RELEASE ORAL
Qty: 90 TABLET | Refills: 2 | Status: SHIPPED | OUTPATIENT
Start: 2023-02-19 | End: 2024-02-12

## 2023-03-07 RX ORDER — BISACODYL 5 MG/1
TABLET, DELAYED RELEASE ORAL
Qty: 4 TABLET | Refills: 0 | Status: SHIPPED | OUTPATIENT
Start: 2023-03-07 | End: 2023-05-15

## 2023-03-07 NOTE — TELEPHONE ENCOUNTER
Rescheduled procedure to 3/21/23    Attempted to contact patient regarding upcoming Colonoscopy  procedure on 3/21/23 for pre assessment questions. No answer.     Left message to return call to [phone] #4    Discuss Covid policy and designated  policy.    Pre op exam? N/A    Arrival time: 1245. Procedure time: 1230    Facility location: [facility] [place]; [address], [place] [address]    Sedation type: MAC    Anticoagulants: No    Electronic implanted devices? No    Diabetic? No    Indication for procedure: abdominal pain - verify that patient is not on antibiotics and resolved diverticulitis flare (see below)     Bowel prep recommendation: Standard Golytely  (reordered)     Prep instructions sent via [email or web address]. Bowel prep script sent to    [facility] [address], [address]    Alisia Colon RN  Endoscopy Procedure Pre Assessment RN

## 2023-03-13 ENCOUNTER — TELEPHONE (OUTPATIENT)
Dept: GASTROENTEROLOGY | Facility: CLINIC | Age: 53
End: 2023-03-13
Payer: COMMERCIAL

## 2023-03-13 NOTE — TELEPHONE ENCOUNTER
The patient called in to report that he just left the emergency room and was again diagnosed with diverticulitis.  He was issued a 7-day course of Flagyl.   Will send scoping provider staff message for direction.  Assuming the patient will need to cancel the colonoscopy for the 21st.  Just need a guideline as to when this should be rescheduled for.     Izzy Momin, RN   Endoscopy Procedure Pre Assessment RN

## 2023-03-13 NOTE — TELEPHONE ENCOUNTER
Information reviewed by Rashmi Colmenares, recommendation to be seen in ED due to pain.  Patient given message and will be seen today.     Lisandra Beach Rn

## 2023-03-13 NOTE — TELEPHONE ENCOUNTER
"Patient called today with concerns of worsening abdominal pain.  He is unable to describe, just repeats, \"it is so painful.\"  He states that pain has gotten worse over the past few days and today pain is a 8/10 and stretches across the entire abdomen from the belt line down.  Pain is constant.  He is having nausea, no vomiting.  Reports diarrhea, 4 episodes today already.  He has used imodium, but this is not helping.  Denies any blood or mucus in stools, no strong odor.  In January, Patient was treated with Azithromycin and Flagyl due to diverticulitis.      LOV 01/10/2023  ASSESSMENT/PLAN:  Pleasant 52 year old male  presented  to GI clinic for evaluation and management of lower abdominal pain with associated diarrhea, nausea and vomiting, and episodes of black stools.  Patient reported up to 12 loose or mucousy stools a day on his worse days.  He denies any red blood per rectum.  Stated that his symptoms may go away for a month or so, when he would have normal formed stools and good appetite.  Admits to weight loss approximately 15pounds in the past year.  He tried \"bland diet\" but it did not make a big difference.  Admits to alcohol intake, approximately 7 drinks a week.  At onset of his symptoms approximately 1 year ago, patient's CT scan was suggestive for colitis of sigmoid colon and diverticulosis. He does not recall that any treatment was ordered but failed to follow up.  His last abdominal ultrasound showed hepatic steatosis and some irregularity of the aorta.  Laboratory work was unremarkable except of mild elevation in bilirubin and AST and high triglycerides at 599.     I discussed the differential diagnosis with the patient.  Explained to the patient that we would need to proceed with colonoscopy and biopsy of the colonic mucosa in order to evaluate him for possible colitis.  I suggested to repeat CT scan of abdomen pelvis, while waiting for colonoscopy (> 30 days waiting list).  Will order stool " studies to exclude infection and malabsorption.  We will repeat CBC and order CRP and hepatic panel.   Symptomatic management recommended.  Abstain alcohol. Avoid spicy foods, dairy, acidic fluids or foods. Will send prescription for loperamide to take 1 tablet in the morning, and repeat half a tablet after each consecutive stool as needed.  Patient may also try dicyclomine for abdominal spasms.  I explained to the patient that if he found to have colitis, his treatment will be adjusted accordingly.  Patient reports urinary frequency and suprapubic pain.  History of solitary kidney and right kidney stones.  UA was ordered.  Recommended to follow-up with primary care provider or urology if signs of hematuria or UTI.Will send [employer] message with lab report.

## 2023-03-13 NOTE — TELEPHONE ENCOUNTER
Reason for Call:  Other call back    Detailed comments: pt calling wanting to know what he can do about his abdominal pain. He says it is worse then it has ever been. Please call pt back to discuss. Thank you    Phone Number Patient can be reached at: Cell number on file:    Telephone Information:   Mobile [phone]       Best Time: any    Can we leave a detailed message on this number? YES    Call taken on 3/13/2023 at 10:01 AM by Carline Crawford

## 2023-03-14 ENCOUNTER — TELEPHONE (OUTPATIENT)
Dept: GASTROENTEROLOGY | Facility: CLINIC | Age: 53
End: 2023-03-14
Payer: COMMERCIAL

## 2023-03-14 NOTE — TELEPHONE ENCOUNTER
Per staff message from provider:    Adrian Silver DO Zilbauer, Rachel M, RN  6 weeks from now. Prefer to wait until this episode has resolved                 Called and left a detailed message with the Pt advising the procedure will be cancelled and he should reschedule at least 6 weeks out. Staff message sent to scheduling to cancel the procedure.     Izzy Momin, RN   Endoscopy Procedure Pre Assessment RN

## 2023-03-14 NOTE — TELEPHONE ENCOUNTER
Caller: Marcus Delacruz    Reason for Reschedule/Cancellation (please be detailed, any staff messages or encounters to note?): Per staff message to reschedule.    Izzy Momin RN   Endoscopy Scheduling Pool; Izzy Momin RN  Hello,     Can you please cancel this patients Colonoscopy scheduled for 3/21 due to current diverticulitis episode. Can you give him a call to reschedule this please at least 6 weeks out from today. I also left him a message as well. Thank you.     ISABELA Magana           Previous Messages       ----- Message -----   From: Adrian Silver DO   Sent: 3/13/2023   2:46 PM CDT   To: Izzy Momin RN   Subject: RE: Diverticulitis review                         6 weeks from now. Prefer to wait until this episode has resolved   ----- Message -----   From: Izzy Momin RN   Sent: 3/13/2023   2:43 PM CDT   To: Adrian Silver DO, *   Subject: Diverticulitis review                             Hi Dr. Silver,     This Pt is scheduled with you on 3/21/23 for a Colonoscopy.     Previously had an episode of diverticulitis in January.  His previous colonoscopy was postponed for this reason until March.     The patient reports he is just now today leaving the emergency room and was again diagnosed with diverticulitis.  He was issued a 7-day course of Flagyl.     Please advise.  Assuming the colonoscopy on 3/21 should be canceled.  What timeframe would you recommend the patient have this procedure done?     Izzy Momin RN   Endoscopy Procedure Pre Assessment RN       Prior to reschedule please review:    Ordering Provider:Rashmi Colmenares APRN CNP    Sedation per order:MODERATE    Does patient have any ASC Exclusions, please identify?: NO      Notes on Cancelled Procedure:    Procedure:Lower Endoscopy [Colonoscopy]     Date: 3/21    Location:[facility] [place]; [address], [place] [address]    Surgeon:  DRU        Rescheduled: Yes    Procedure: Lower Endoscopy [Colonoscopy]     Date: 5/15    Location:[facility] [place]; [address], [place] [address]    Surgeon: BEOT    Sedation Level Scheduled  MAC,  Reason for Sedation Level Per TE dated 1/23 patient request    Prep/Instructions updated and sent: resent via [employer]

## 2023-04-17 DIAGNOSIS — I10 ESSENTIAL HYPERTENSION: ICD-10-CM

## 2023-04-17 RX ORDER — AMLODIPINE BESYLATE 5 MG/1
TABLET ORAL
Qty: 90 TABLET | Refills: 1 | Status: SHIPPED | OUTPATIENT
Start: 2023-04-17 | End: 2023-12-12

## 2023-04-27 ENCOUNTER — TELEPHONE (OUTPATIENT)
Dept: GASTROENTEROLOGY | Facility: CLINIC | Age: 53
End: 2023-04-27
Payer: COMMERCIAL

## 2023-04-27 NOTE — TELEPHONE ENCOUNTER
Reschedule: Pt was recovering from diverticulitis flare. See below.     Patient scheduled for Colonoscopy  on 5/15/23.     Discuss Covid policy.     Pre op exam needed? N/A    Arrival time: 1340. Procedure time 1425    Facility location: [facility] [place]; [address], [place] [address]    Sedation type: MAC    Anticoagulations? No    Electronic implanted devices? No    Diabetic? No    Indication for procedure:  diarrhea, lower abdominal pain, weight loss, bloody stools intermit. X one year. Colitis? IBD?    Bowel prep recommendation: Standard Golytely  -- already sent in.     Prep instructions sent via [employer]     Pre visit planning completed.    Izzy Momin RN  Endoscopy Procedure Pre Assessment RN

## 2023-05-01 NOTE — TELEPHONE ENCOUNTER
Attempted to reach the patient to complete pre-assessment. First attempt. No answer. Left message.     Izzy Momin RN   Endoscopy Procedure Pre Assessment RN

## 2023-05-03 DIAGNOSIS — Z79.2 NEED FOR PROPHYLACTIC ANTIBIOTIC: ICD-10-CM

## 2023-05-03 RX ORDER — CLINDAMYCIN HCL 300 MG
CAPSULE ORAL
Qty: 2 CAPSULE | Refills: 1 | Status: SHIPPED | OUTPATIENT
Start: 2023-05-03 | End: 2023-08-01

## 2023-05-03 NOTE — TELEPHONE ENCOUNTER
Patient needs asap for a dental appt today at 2:30pm.    Thank You  Eufemia Peters, [facility]  [phone]

## 2023-05-04 NOTE — TELEPHONE ENCOUNTER
Attempted to reach the patient to complete pre-assessment. Second attempt. No answer. Left message. [employer] message sent.     Izzy Momin RN   Endoscopy Procedure Pre Assessment RN

## 2023-05-05 NOTE — TELEPHONE ENCOUNTER
Pre assessment questions completed for upcoming Colonoscopy  procedure scheduled on 05.15.2023    COVID policy reviewed.     Reviewed procedural arrival time 1340 and facility location [facility]; [address], [place] [address]    Designated  policy reviewed. Instructed to have someone stay 24 hours post procedure.     Patient is done with antibiotics and no recent diverticulitis flare.    NSAIDs? No    Anticoagulation/blood thinners? No    Electronic implanted devices? No    Diabetic? No    Reviewed procedure prep instructions.     Patient verbalized understanding and had no questions or concerns at this time.    Alisia Pelletier, ISABELA  Endoscopy Procedure Pre Assessment RN

## 2023-05-15 ENCOUNTER — HOSPITAL ENCOUNTER (OUTPATIENT)
Facility: AMBULATORY SURGERY CENTER | Age: 53
Discharge: HOME | End: 2023-05-15
Attending: INTERNAL MEDICINE
Payer: COMMERCIAL

## 2023-05-15 ENCOUNTER — ANESTHESIA EVENT (OUTPATIENT)
Dept: SURGERY | Facility: AMBULATORY SURGERY CENTER | Age: 53
End: 2023-05-15
Payer: COMMERCIAL

## 2023-05-15 ENCOUNTER — ANESTHESIA (OUTPATIENT)
Dept: SURGERY | Facility: AMBULATORY SURGERY CENTER | Age: 53
End: 2023-05-15
Payer: COMMERCIAL

## 2023-05-15 VITALS
OXYGEN SATURATION: 99 % | RESPIRATION RATE: 16 BRPM | HEART RATE: 65 BPM | SYSTOLIC BLOOD PRESSURE: 150 MMHG | DIASTOLIC BLOOD PRESSURE: 104 MMHG | TEMPERATURE: 96.6 F

## 2023-05-15 VITALS — HEART RATE: 66 BPM

## 2023-05-15 LAB — COLONOSCOPY: NORMAL

## 2023-05-15 PROCEDURE — G8907 PT DOC NO EVENTS ON DISCHARG: HCPCS

## 2023-05-15 PROCEDURE — 88305 TISSUE EXAM BY PATHOLOGIST: CPT | Performed by: STUDENT IN AN ORGANIZED HEALTH CARE EDUCATION/TRAINING PROGRAM

## 2023-05-15 PROCEDURE — G8918 PT W/O PREOP ORDER IV AB PRO: HCPCS

## 2023-05-15 PROCEDURE — 45380 COLONOSCOPY AND BIOPSY: CPT

## 2023-05-15 RX ORDER — SODIUM CHLORIDE, SODIUM LACTATE, POTASSIUM CHLORIDE, CALCIUM CHLORIDE 600; 310; 30; 20 MG/100ML; MG/100ML; MG/100ML; MG/100ML
INJECTION, SOLUTION INTRAVENOUS CONTINUOUS
Status: DISCONTINUED | OUTPATIENT
Start: 2023-05-15 | End: 2023-05-16 | Stop reason: HOSPADM

## 2023-05-15 RX ORDER — FLUMAZENIL 0.1 MG/ML
0.2 INJECTION, SOLUTION INTRAVENOUS
Status: DISCONTINUED | OUTPATIENT
Start: 2023-05-15 | End: 2023-05-16 | Stop reason: HOSPADM

## 2023-05-15 RX ORDER — NALOXONE HYDROCHLORIDE 0.4 MG/ML
0.2 INJECTION, SOLUTION INTRAMUSCULAR; INTRAVENOUS; SUBCUTANEOUS
Status: DISCONTINUED | OUTPATIENT
Start: 2023-05-15 | End: 2023-05-16 | Stop reason: HOSPADM

## 2023-05-15 RX ORDER — PROPOFOL 10 MG/ML
INJECTION, EMULSION INTRAVENOUS CONTINUOUS PRN
Status: DISCONTINUED | OUTPATIENT
Start: 2023-05-15 | End: 2023-05-15

## 2023-05-15 RX ORDER — NALOXONE HYDROCHLORIDE 0.4 MG/ML
0.4 INJECTION, SOLUTION INTRAMUSCULAR; INTRAVENOUS; SUBCUTANEOUS
Status: DISCONTINUED | OUTPATIENT
Start: 2023-05-15 | End: 2023-05-16 | Stop reason: HOSPADM

## 2023-05-15 RX ORDER — PROPOFOL 10 MG/ML
INJECTION, EMULSION INTRAVENOUS PRN
Status: DISCONTINUED | OUTPATIENT
Start: 2023-05-15 | End: 2023-05-15

## 2023-05-15 RX ORDER — LIDOCAINE 40 MG/G
CREAM TOPICAL
Status: DISCONTINUED | OUTPATIENT
Start: 2023-05-15 | End: 2023-05-16 | Stop reason: HOSPADM

## 2023-05-15 RX ORDER — ONDANSETRON 2 MG/ML
4 INJECTION INTRAMUSCULAR; INTRAVENOUS
Status: DISCONTINUED | OUTPATIENT
Start: 2023-05-15 | End: 2023-05-16 | Stop reason: HOSPADM

## 2023-05-15 RX ORDER — ONDANSETRON 4 MG/1
4 TABLET, ORALLY DISINTEGRATING ORAL EVERY 6 HOURS PRN
Status: DISCONTINUED | OUTPATIENT
Start: 2023-05-15 | End: 2023-05-16 | Stop reason: HOSPADM

## 2023-05-15 RX ORDER — LIDOCAINE HYDROCHLORIDE 20 MG/ML
INJECTION, SOLUTION INFILTRATION; PERINEURAL PRN
Status: DISCONTINUED | OUTPATIENT
Start: 2023-05-15 | End: 2023-05-15

## 2023-05-15 RX ORDER — PROCHLORPERAZINE MALEATE 10 MG
10 TABLET ORAL EVERY 6 HOURS PRN
Status: DISCONTINUED | OUTPATIENT
Start: 2023-05-15 | End: 2023-05-16 | Stop reason: HOSPADM

## 2023-05-15 RX ORDER — ONDANSETRON 2 MG/ML
4 INJECTION INTRAMUSCULAR; INTRAVENOUS EVERY 6 HOURS PRN
Status: DISCONTINUED | OUTPATIENT
Start: 2023-05-15 | End: 2023-05-16 | Stop reason: HOSPADM

## 2023-05-15 RX ADMIN — PROPOFOL 50 MG: 10 INJECTION, EMULSION INTRAVENOUS at 15:10

## 2023-05-15 RX ADMIN — PROPOFOL 150 MCG/KG/MIN: 10 INJECTION, EMULSION INTRAVENOUS at 15:12

## 2023-05-15 RX ADMIN — LIDOCAINE HYDROCHLORIDE 60 MG: 20 INJECTION, SOLUTION INFILTRATION; PERINEURAL at 15:10

## 2023-05-15 RX ADMIN — SODIUM CHLORIDE, SODIUM LACTATE, POTASSIUM CHLORIDE, CALCIUM CHLORIDE: 600; 310; 30; 20 INJECTION, SOLUTION INTRAVENOUS at 15:08

## 2023-05-15 NOTE — ANESTHESIA CARE TRANSFER NOTE
Patient: Marcus Delacruz    Procedure: Procedure(s):  COLONOSCOPY, WITH CO2 INSUFFLATION       Diagnosis: Bilateral lower abdominal pain [R10.31, R10.32]  Diagnosis Additional Information: No value filed.    Anesthesia Type:   MAC     Note:    Oropharynx: oropharynx clear of all foreign objects  Level of Consciousness: awake  Oxygen Supplementation: room air    Independent Airway: airway patency satisfactory and stable  Dentition: dentition unchanged  Vital Signs Stable: post-procedure vital signs reviewed and stable  Report to RN Given: handoff report given  Patient transferred to: Phase II    Handoff Report: Identifed the Patient, Identified the Reponsible Provider, Reviewed the pertinent medical history, Discussed the surgical course, Reviewed Intra-OP anesthesia mangement and issues during anesthesia, Set expectations for post-procedure period and Allowed opportunity for questions and acknowledgement of understanding      Vitals:  Vitals Value Taken Time   BP     Temp     Pulse 66 05/15/23 1530   Resp     SpO2         Electronically Signed By: CARLOS Brown CRNA  May 15, 2023  3:33 PM

## 2023-05-15 NOTE — ANESTHESIA POSTPROCEDURE EVALUATION
Patient: Marcus Delacruz    Procedure: Procedure(s):  COLONOSCOPY, WITH CO2 INSUFFLATION       Anesthesia Type:  MAC    Note:  Disposition: Outpatient   Postop Pain Control: Uneventful            Sign Out: Well controlled pain   PONV: No   Neuro/Psych: Uneventful            Sign Out: Acceptable/Baseline neuro status   Airway/Respiratory: Uneventful            Sign Out: Acceptable/Baseline resp. status   CV/Hemodynamics: Uneventful            Sign Out: Acceptable CV status; No obvious hypovolemia; No obvious fluid overload   Other NRE:    DID A NON-ROUTINE EVENT OCCUR? No           Last vitals:  Vitals Value Taken Time   /104 05/15/23 1550   Temp 96.6  F (35.9  C) 05/15/23 1537   Pulse     Resp 16 05/15/23 1550   SpO2 99 % 05/15/23 1550       Electronically Signed By: Brandon Cedillo MD  May 15, 2023  4:23 PM

## 2023-05-15 NOTE — ANESTHESIA PREPROCEDURE EVALUATION
Anesthesia Pre-Procedure Evaluation    Patient: Marcus Delacruz   MRN: [id number] : 1970        Procedure : Procedure(s):  COLONOSCOPY, WITH CO2 INSUFFLATION          Past Medical History:   Diagnosis Date     Hypertension goal BP (blood pressure) < 140/90 10/9/2014     Kidney congenitally absent, left 2011     Kidney stones 2011      Past Surgical History:   Procedure Laterality Date     EXTRACORPOREAL SHOCK WAVE LITHOTRIPSY (ESWL)  .3.4.2016     GENITOURINARY SURGERY  Kidney Stones    21 Lithotripsy procedures     HERNIA REPAIR  2017     JOINT REPLACEMENT Right 2020     ORTHOPEDIC SURGERY  2015    right shoulder rotor cuff     TONSILLECTOMY  2008      Allergies   Allergen Reactions     Cefazolin Anaphylaxis     Cephalosporins Anaphylaxis     Severe reaction. anaphylaxis     Azo [Phenazopyridine] Hives     Flomax [Tamsulosin] Hives     Levaquin [Levofloxacin Hemihydrate] Hives     Amoxicillin Hives     Detrol [Tolterodine Tartrate] Hives      Social History     Tobacco Use     Smoking status: Never     Smokeless tobacco: Former     Tobacco comments:     NON SMOKING HOME   Vaping Use     Vaping status: Never Used   Substance Use Topics     Alcohol use: Yes     Comment: OCC      Wt Readings from Last 1 Encounters:   01/10/23 78.9 kg (174 lb)        Anesthesia Evaluation            ROS/MED HX  ENT/Pulmonary:     (+) DANIEL risk factors, snores loudly, hypertension,     Neurologic:       Cardiovascular:     (+) hypertension----- (-) murmur   METS/Exercise Tolerance: >4 METS    Hematologic:       Musculoskeletal:       GI/Hepatic:     (+) bowel prep,     Renal/Genitourinary:     (+) Nephrolithiasis ,     Endo:       Psychiatric/Substance Use:       Infectious Disease:       Malignancy:       Other:            Physical Exam    Airway        Mallampati: II   TM distance: > 3 FB   Neck ROM: full   Mouth opening: > 3 cm    Respiratory Devices and Support         Dental       (+) Minor Abnormalities -  some fillings, tiny chips      Cardiovascular          Rhythm and rate: regular and normal (-) no murmur    Pulmonary   pulmonary exam normal        breath sounds clear to auscultation           OUTSIDE LABS:  CBC:   Lab Results   Component Value Date    WBC 5.5 01/10/2023    WBC 8.2 11/14/2022    HGB 14.6 01/10/2023    HGB 14.0 11/14/2022    HCT 44.2 01/10/2023    HCT 42.6 11/14/2022     01/10/2023     11/14/2022     BMP:   Lab Results   Component Value Date     01/10/2023     10/31/2022    POTASSIUM 4.3 01/10/2023    POTASSIUM 4.0 10/31/2022    CHLORIDE 104 01/10/2023    CHLORIDE 103 10/31/2022    CO2 30 01/10/2023    CO2 29 10/31/2022    BUN 10 01/10/2023    BUN 19 10/31/2022    CR 0.86 01/10/2023    CR 1.49 (H) 10/31/2022     (H) 01/10/2023     (H) 10/31/2022     COAGS: No results found for: PTT, INR, FIBR  POC: No results found for: BGM, HCG, HCGS  HEPATIC:   Lab Results   Component Value Date    ALBUMIN 3.6 01/10/2023    PROTTOTAL 7.5 01/10/2023    ALT 70 01/10/2023    AST 58 (H) 01/10/2023     (H) 01/10/2023    ALKPHOS 93 01/10/2023    BILITOTAL 0.7 01/10/2023     OTHER:   Lab Results   Component Value Date    A1C 5.5 10/31/2022    KARLEY 9.4 01/10/2023    PHOS 3.6 10/31/2022    LIPASE 178 11/14/2022    CRP 14.5 (H) 01/10/2023       Anesthesia Plan    ASA Status:  2   NPO Status:  NPO Appropriate    Anesthesia Type: MAC.     - Reason for MAC: immobility needed   Induction: Intravenous, Propofol.   Maintenance: TIVA.        Consents    Anesthesia Plan(s) and associated risks, benefits, and realistic alternatives discussed. Questions answered and patient/representative(s) expressed understanding.    - Discussed:     - Discussed with:  Patient      - Extended Intubation/Ventilatory Support Discussed: No.      - Patient is DNR/DNI Status: No    Use of blood products discussed: No .     Postoperative Care    Pain management: IV analgesics.   PONV prophylaxis: Ondansetron  (or other 5HT-3), Background Propofol Infusion     Comments:    Other Comments: Discussed plan for IV anesthetic with native airway. Discussed potential need for conversion to general anesthetic with airway management and potential for recall.         H&P reviewed: Unable to attach H&P to encounter due to EHR limitations. H&P Update: appropriate H&P reviewed, patient examined. No interval changes since H&P (within 30 days).         Brandon Cedillo MD

## 2023-05-15 NOTE — H&P
[facility] Anesthesia Pre-op History and Physical    Marcus Delacruz MRN# [id number]   Age: 52 year old YOB: 1970            Date of Exam 5/15/2023       Primary care provider: Rj Mejia         Chief Complaint and/or Reason for Procedure:     LLQ pain persistent after episode of diverticulitis in March         Active problem list:     Patient Active Problem List    Diagnosis Date Noted     Other male erectile dysfunction 11/11/2016     Priority: Medium     Hyperglycemia 11/11/2016     Priority: Medium     Special screening for malignant neoplasm of prostate 10/22/2015     Priority: Medium     Rotator cuff tear 05/12/2015     Priority: Medium     Hypertension goal BP (blood pressure) < 140/90 10/09/2014     Priority: Medium     Microhematuria 10/09/2014     Priority: Medium     Elevated blood pressure reading without diagnosis of hypertension 09/19/2012     Priority: Medium     Pain in joint, lower leg 10/20/2011     Priority: Medium     Kidney congenitally absent, 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 No     Current Outpatient Medications   Medication Sig     amLODIPine (NORVASC) 5 MG tablet TAKE 1 TABLET DAILY     clindamycin (CLEOCIN) 300 MG capsule TAKE 2 CAPSULES BY MOUTH 1 HOUR PRIOR TO DENTAL APPOINTMENT.     dicyclomine (BENTYL) 10 MG capsule Take 1 capsule (10 mg) by mouth 4 times daily as needed (as needed for abdominal cramping)     loperamide (IMODIUM A-D) 2 MG tablet Take 1 tablet (2 mg) by mouth 4 times daily as needed for diarrhea Take one tablet after 2 or more loose stools or one watery stool. Then, take half of the tablet as needed for consequent loose stools     metoprolol succinate ER (TOPROL XL) 50 MG 24 hr tablet TAKE 1 TABLET AT BEDTIME FOR  BLOOD PRESSURE     sildenafil (REVATIO) 20 MG tablet 2-3 tabs daily as needed for ED     Current Facility-Administered Medications   Medication     lactated ringers infusion     lidocaine (LMX4) kit     lidocaine 1 % 0.1-1 mL     ondansetron (ZOFRAN) injection 4 mg     sodium chloride (PF) 0.9% PF flush 3 mL     sodium chloride (PF) 0.9% PF flush 3 mL             Allergies:      Allergies   Allergen Reactions     Cefazolin Anaphylaxis     Cephalosporins Anaphylaxis     Severe reaction. anaphylaxis     Azo [Phenazopyridine] Hives     Flomax [Tamsulosin] Hives     Levaquin [Levofloxacin Hemihydrate] Hives     Amoxicillin Hives     Detrol [Tolterodine Tartrate] Hives     Allergy to Latex? No  Allergy to tape?   No  Intolerances:             Physical Exam:   All vitals have been reviewed  Patient Vitals for the past 8 hrs:   BP Temp Temp src Pulse Resp SpO2   05/15/23 1343 (!) 176/106 97.7  F (36.5  C) Temporal 65 16 100 %     No intake/output data recorded.  Lungs:   No increased work of breathing, good air exchange, clear to auscultation bilaterally, no crackles or wheezing     Cardiovascular:   normal S1 and S2             Lab / Radiology Results:            Anesthetic risk and/or ASA classification:     2  Paula Fierro DO

## 2023-05-18 LAB
PATH REPORT.COMMENTS IMP SPEC: NORMAL
PATH REPORT.COMMENTS IMP SPEC: NORMAL
PATH REPORT.FINAL DX SPEC: NORMAL
PATH REPORT.GROSS SPEC: NORMAL
PATH REPORT.MICROSCOPIC SPEC OTHER STN: NORMAL
PATH REPORT.RELEVANT HX SPEC: NORMAL
PHOTO IMAGE: NORMAL

## 2023-05-19 ENCOUNTER — TELEPHONE (OUTPATIENT)
Dept: GASTROENTEROLOGY | Facility: CLINIC | Age: 53
End: 2023-05-19
Payer: COMMERCIAL

## 2023-05-19 NOTE — TELEPHONE ENCOUNTER
----- Message from CARLOS Elizabeth CNP sent at 5/18/2023  5:08 PM CDT -----  Please schedule the patient for a follow up, first available appointment in the next 2 weeks. OK to do virtual visit.  Thank you,  Rashmi Colmenares CNP    ----- Message -----  From: Paula Fierro DO  Sent: 5/18/2023  12:06 PM CDT  To: CARLOS Elizabeth CNP; #    See My Chart comments    Paula Fierro DO  05/18/23  12:05 PM

## 2023-05-19 NOTE — TELEPHONE ENCOUNTER
Call placed, message left to return call when able.     Rashmi would like to do return visit in the next couple weeks.  This can be done virtually if patient would like.     Lisandra Beach Rn

## 2023-07-13 ENCOUNTER — MYC MEDICAL ADVICE (OUTPATIENT)
Dept: GASTROENTEROLOGY | Facility: CLINIC | Age: 53
End: 2023-07-13
Payer: COMMERCIAL

## 2023-08-01 ENCOUNTER — OFFICE VISIT (OUTPATIENT)
Dept: GASTROENTEROLOGY | Facility: CLINIC | Age: 53
End: 2023-08-01
Payer: COMMERCIAL

## 2023-08-01 VITALS
TEMPERATURE: 97.3 F | SYSTOLIC BLOOD PRESSURE: 122 MMHG | BODY MASS INDEX: 28.25 KG/M2 | DIASTOLIC BLOOD PRESSURE: 82 MMHG | HEIGHT: 67 IN | WEIGHT: 180 LBS

## 2023-08-01 DIAGNOSIS — R10.32 BILATERAL LOWER ABDOMINAL PAIN: ICD-10-CM

## 2023-08-01 DIAGNOSIS — R10.31 BILATERAL LOWER ABDOMINAL PAIN: ICD-10-CM

## 2023-08-01 DIAGNOSIS — R19.7 DIARRHEA, UNSPECIFIED TYPE: ICD-10-CM

## 2023-08-01 DIAGNOSIS — K57.92 ACUTE DIVERTICULITIS: Primary | ICD-10-CM

## 2023-08-01 PROCEDURE — 99214 OFFICE O/P EST MOD 30 MIN: CPT | Performed by: NURSE PRACTITIONER

## 2023-08-01 ASSESSMENT — PAIN SCALES - GENERAL: PAINLEVEL: NO PAIN (0)

## 2023-08-01 NOTE — PATIENT INSTRUCTIONS
It was a pleasure taking care of you today.  I've included a brief summary of our discussion and care plan from today's visit below.  Please review this information with your primary care provider.  ______________________________________________________________________    My recommendations are summarized as follows:    Start taking a fiber supplement. I placed an order for metamucil. Remember to start with a low dose and slowly taper it up if needed.    2. Continue to take loperamide as needed for diarrhea.    3. Please let me know if your diarrhea persists beyond 4 weeks and I'll give you cholestyramine to help healing of your colonic mucosa.    4. Continue watching your diet as spicy, greasy, and fried foods may upset your stomach. Limit caffeine and alcohol intake.    Return to GI Clinic in 6 months to review your progress.    ______________________________________________________________________    A high-fiber diet is a commonly recommended treatment for digestive problems, such as constipation, diarrhea, and hemorrhoids.   Most dietary fiber is not digested or absorbed, so it stays within the intestine where it modulates digestion of other foods and affects the consistency of stool. There are two types of fiber, each of which is thought to have its own benefits:  ?Soluble fiber consists of a group of substances that is made of carbohydrates and dissolves in water. Examples of foods that contain soluble fiber include fruits, oats, barley, and legumes (peas and beans).  ?Insoluble fiber comes from plant cell walls and does not dissolve in water. Examples of foods that contain insoluble fiber include wheat, rye, and other grains. Insoluble fiber (wheat bran, and some fruits and vegetables) has been recommended to treat digestive problems such as constipation, hemorrhoids, chronic diarrhea, and fecal incontinence.   ?Dietary fiber is the sum of all soluble and insoluble fiber.Fiber bulks the stool, making it  softer and easier to pass. Fiber helps the stool pass regularly, although it is not a laxative.   - Recommended daily dose of fiber is 25-35 gram. It is difficult to consume this amount of fiber from food alone. Therefore, I would suggest to take fiber supplement.  - Please start supplementation with a powdered soluble fiber. When used on a daily basis, this can help regulate the consistency of your stools.   - Metamucil (psyllium) and Citrucel are preferred examples. You can start with 1-2 teaspoons per day, with goal to gradually increase the dose  to 1 tablespoon daily. You can increase up to 1 tablespoon three times daily if needed.   - It is important to stay well-hydrated with use of fiber supplementation and to make sure that the fiber powder is well mixed with water as directed on the label.   - You may experience some bloating with initiation of fiber, which will improve over the first few weeks. We will evaluate the effect  of fiber in 3-6 months.   - Of note, many of the fiber products contain artificial sweeteners, which can cause bloating, gas, and diarrhea in those who may be sensitive to artificial sweeteners. If this is the case, I would recommend trying Metamucil Premium Blend (with Stevia), Metamucil 4-in-1 without Added Sweeteners, or Bellway (sweetened with Monk fruit extract).       DIVERTICULOSIS  A diverticulum is a pouch-like structure that can form through points of weakness in the muscular wall of the colon (ie, at points where blood vessels pass through the wall).   Diverticulosis merely describes the presence of diverticula. Diverticulosis is often found during a test done for other reasons, such as flexible sigmoidoscopy, colonoscopy, or barium enema. Most people with diverticulosis have no symptoms and will remain symptom free for the rest of their lives.     People with diverticulosis who do not have symptoms do not require treatment. However, most clinicians recommend increasing fiber  in the diet, which can help to bulk the stools and possibly prevent the development of new diverticula, diverticulitis, or diverticular bleeding. Fiber is not proven to prevent these conditions in all patients but may help to control recurrent episodes in some.   Fruits and vegetables are a good source of fiber. Patients with diverticular disease have historically been advised to avoid whole pieces of fiber (such as seeds, corn, and nuts) because of concern that these foods could cause an episode of diverticulitis. However, this belief is completely unproven. We do not suggest that patients with diverticulosis avoid seeds, corn, or nuts.     Approximately 15 to 25 percent of people with diverticulosis may develop diverticulitis, which is inflammation of diverticulum.  This may be caused by increased pressure within the colon or by hardened particles of stool, which can become lodged within the diverticulum. The symptoms of diverticulitis depend upon the degree of inflammation present. The most common symptom is pain in the left lower abdomen. Other symptoms can include nausea and vomiting, constipation, diarrhea, and urinary symptoms such as pain or burning when urinating or the frequent need to urinate.   If your disease is mild and you are otherwise healthy, you might be treated at home. This usually involves a liquid diet and pain-relieving medication. However, if you develop one or more of the following signs or symptoms, you should seek immediate medical attention:  ?Temperature >100.1 F (38 C)  ?Worsening or severe abdominal pain  ?Inability to tolerate fluids      ______________________________________________________________________    Who do I call with any questions after my visit?  Please be in touch if there are any further questions that arise following today's visit.  There are multiple ways to contact your gastroenterology care team.      During business hours, you may reach a Gastroenterology nurse  at [phone], option 3.     To schedule or reschedule an appointment, please call [phone].   To schedule your imaging studies (CT, MRI, ultrasound)  call [phone] (or toll-free # [phone])  To schedule your lab work at [facility], please call [phone]    You can always send a secure message through HiMom.  HiMom messages are answered by your nurse or doctor typically within 24 hours.  Please allow extra time on weekends and holidays.      For urgent/emergent questions after business hours, you may reach the on-call GI Fellow by contacting the [facility]  at [phone].    In order for your refill to be processed in a timely fashion, it is your responsibility to ensure you follow the recommendations from your provider regarding your laboratory studies and follow up appointments.       How will I get the results of any tests ordered?    You will receive all of your results.  If you have signed up for [employer], any tests ordered at your visit will be available to you after your physician reviews them.  Typically this takes 1-2 weeks.  If there are urgent results that require a change in your care plan, your physician or nurse will call you to discuss the next steps.   What is HiMom?  HiMom is a secure way for you to access all of your healthcare records from the [facility].  It is a web based computer program, so you can sign on to it from any location.  It also allows you to send secure messages to your care team.  I recommend signing up for HiMom access if you have not already done so and are comfortable with using a computer.    How to I schedule a follow-up visit?  If you did not schedule a follow-up visit today, please call [phone] to schedule a follow-up office visit.      Sincerely,  MARLENY Elizabeth,  [facility],  Division of Gastroenterology   ([facility])

## 2023-08-01 NOTE — LETTER
8/1/2023         RE: Marcus Delacruz  [id number] [place] [address]        Dear Colleague,    Thank you for referring your patient, Marcus Delacruz, to the [facility]. Please see a copy of my visit note below.    Gastroenterology CLINIC VISIT, RETURN PATIENT    CC/REFERRING PROVIDER: Referred Self  REASON FOR CONSULTATION: follow up    HPI: 52 year old male presented to GI clinic for follow up on lower abdominal pain and diarrhea since about one year ago. Patient reported up to 12 loose or mucousy stools a day on his worse days.  Lost about 15 lbs in the firstt 6 months since onset of his symptoms. CT scan was suggestive for colitis of sigmoid colon and diverticulosis. I referred the patient for colonoscopy. Due to a long waiting time (>60 days), a follow up abdominal CT was ordered. (See studies findings below). Symptomatic treatment with dicyclomine and loperamide was ordered.  Patient found to have acute diverticulitis, was treated with antibiotics, tolerated well.  Stated that his symptoms improved by about 75%. Still has occasional lower abdominal pains. Reported bouts of mushy stools in the morning, within the first 2-3 hours upon awakening. No nocturnal symptoms. Appetite had improved, but the patient continues watching his diet. No nausea or vomiting. No hematochezia.    PREVIOUS ENDOSCOPY:  5/15/2023 Colonoscopy  Findings:       The perianal and digital rectal examinations were normal.        Multiple small-mouthed diverticula were found in the sigmoid colon.        Erythema and edema was seen in association with the diverticular        opening. Sue-diverticular erythema was seen. Petechia were visualized        in association with the diverticular opening. Biopsies were taken with a        cold forceps for histology.        A 20 mm polypoid lesion was found in the sigmoid colon. The polyp was        semi-pedunculated. Biopsies were taken with a cold forceps for  histology.        The exam was otherwise normal throughout the examined colon.        The terminal ileum appeared normal.     Final Diagnosis   A. RIGHT COLON, BIOPSY:  - Colonic mucosa with no significant histopathologic abnormality  - No evidence of chronic active or microscopic colitis     B. LEFT COLON, BIOPSY:  - Colonic mucosa with no significant histopathologic abnormality  - No evidence of chronic active or microscopic colitis     C. SIGMOID COLON, BIOPSY:  - Colonic mucosa with erosion and reactive changes  - No evidence of chronic active or microscopic colitis     D. SIGMOID COLON, BIOPSY:  - Colonic mucosa with no significant histopathologic abnormality  - Negative for dysplasia       PERTINENT STUDIES Reviewed in EMR  5/15/2023 CT scan of abdomen  Impression  1.  Acute uncomplicated diverticulitis mid sigmoid colon.  2.  Hepatic steatosis, improved since prior.  3.  Interval decrease in overall stone burden in the right kidney. No hydronephrosis.    ROS: 10pt ROS performed and otherwise negative.    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     Kidney stones 9/16/2011       PREVIOUS ABDOMINAL/GYNECOLOGIC SURGERIES:    Past Surgical History:   Procedure Laterality Date     COLONOSCOPY N/A 5/15/2023    Procedure: COLONOSCOPY, WITH POLYPECTOMY AND BIOPSY;  Surgeon: Paula Fierro DO;  Location: [facility] OR     COLONOSCOPY WITH CO2 INSUFFLATION N/A 5/15/2023    Procedure: COLONOSCOPY, WITH CO2 INSUFFLATION;  Surgeon: Paula Fierro DO;  Location: [facility] OR     EXTRACORPOREAL SHOCK WAVE LITHOTRIPSY (ESWL)  .3.4.2016     GENITOURINARY SURGERY  Kidney Stones    21 Lithotripsy procedures     HERNIA REPAIR  2017     JOINT REPLACEMENT Right 03/2020     ORTHOPEDIC SURGERY  2015    right shoulder rotor cuff     TONSILLECTOMY  2008         PERTINENT MEDICATIONS:  Current Outpatient Medications    Medication Sig Dispense Refill     amLODIPine (NORVASC) 5 MG tablet TAKE 1 TABLET DAILY 90 tablet 1     clindamycin (CLEOCIN) 300 MG capsule TAKE 2 CAPSULES BY MOUTH 1 HOUR PRIOR TO DENTAL APPOINTMENT. 2 capsule 1     dicyclomine (BENTYL) 10 MG capsule Take 1 capsule (10 mg) by mouth 4 times daily as needed (as needed for abdominal cramping) 100 capsule 1     loperamide (IMODIUM A-D) 2 MG tablet Take 1 tablet (2 mg) by mouth 4 times daily as needed for diarrhea Take one tablet after 2 or more loose stools or one watery stool. Then, take half of the tablet as needed for consequent loose stools 30 tablet 1     metoprolol succinate ER (TOPROL XL) 50 MG 24 hr tablet TAKE 1 TABLET AT BEDTIME FOR BLOOD PRESSURE 90 tablet 2     sildenafil (REVATIO) 20 MG tablet 2-3 tabs daily as needed for ED 30 tablet 2       No other OTC/herbal/supplements reported by patient.    SOCIAL HISTORY:  Social History     Socioeconomic History     Marital status:      Spouse name: Not on file     Number of children: Not on file     Years of education: Not on file     Highest education level: Not on file   Occupational History     Not on file   Tobacco Use     Smoking status: Never     Smokeless tobacco: Former     Tobacco comments:     NON SMOKING HOME   Vaping Use     Vaping Use: Never used   Substance and Sexual Activity     Alcohol use: Yes     Comment: OCC     Drug use: No     Sexual activity: Yes     Partners: Female     Birth control/protection: None   Other Topics Concern     Parent/sibling w/ CABG, MI or angioplasty before 65F 55M? No      Service No     Blood Transfusions No     Caffeine Concern No     Occupational Exposure No     Hobby Hazards No     Sleep Concern No     Stress Concern No     Weight Concern No     Special Diet No     Back Care No     Exercise No     Bike Helmet No     Seat Belt Yes     Self-Exams No   Social History Narrative     Not on file     Social Determinants of Health     Financial Resource  Strain: Not on file   Food Insecurity: Not on file   Transportation Needs: Not on file   Physical Activity: Not on file   Stress: Not on file   Social Connections: Not on file   Intimate Partner Violence: Not on file   Housing Stability: Not on file       FAMILY HISTORY:  Denies colon/panc/esophageal/other GI CA, no other Luke or other HPS-related Breanna. No IBD/celiac, no other AI/liver/thyroid disease.    Family History   Problem Relation Age of Onset     Cancer Father 68        pancreatic cancer     Diabetes Father      Other Cancer Father        PHYSICAL EXAMINATION:  Vitals reviewed  There were no vitals taken for this visit.    General: Patient appears well in no acute distress.   Skin: No visualized rash or lesions on visualized skin  Eyes: EOMI, no erythema, sclera icterus or discharge noted  Resp: breathing comfortably without accessory muscle usage, speaking in full sentences, no cough. Lung sounds clear  Card: Regular and rhythmic S1 and S2. No gallop or rub. No murmur. No LE edema.  Abdomen: Active bowel sounds X 4 quadrants. Soft to palpation. Mild tenderness in the left lower quadrant.  No guarding or rebound tenderness   MSK: Appears to have normal range of motion based on visualized movements  Neurologic: No apparent tremors, facial movements symmetric  Psych: affect normal, alert and oriented      ASSESSMENT/PLAN:  52 year old male  presented  to GI clinic for a follow up on acute diverticulitis. Reported significant improvement in his symptoms after completing a course of antibiotics. Takes loperamide as needed for bouts of diarrhea, which usually occur in the morning. No red flag symptoms including fever, chills, further weight loss, or hematochezia.   We reviewed and discussed findings of CT scan and colonoscopy. Noted that his hepatic steatosis had improved.  Patient was educated on diverticulosis and recurrent diverticulitis. Suggested to start a low dose of a fiber supplement and gradually taper  it up as tolerates ( 1/2 of teaspoon of metamucil to 1 teaspoon twice a day).  We talked about symptoms of IBS that could develop post-infection or colonic inflammation. Talked about time frame and typical recovery period. Discussed appropriate diet for that time. Advised the patient to contact us in 4-5 weeks if diarrhea persists. May order short course of cholestyramine. Hopefully, added fiber will solidify his stools.  Instructed to take loperamide as needed.       ICD-10-CM    1. Acute diverticulitis  K57.92 psyllium (METAMUCIL/KONSYL) 58.6 % powder      2. Bilateral lower abdominal pain  R10.31     R10.32       3. Diarrhea, unspecified type  R19.7 psyllium (METAMUCIL/KONSYL) 58.6 % powder           RTC 6 months    Thank you for this consultation. It was a pleasure to participate in the care of this patient; please contact us with any further questions.    CARLOS Elizabeth, FNP-C  [facility]  Gastroenterology Department  [place], [place]    This note was created with Dragon voice recognition software, and while reviewed for accuracy, inadvertent minor typographic errors may occur. Please contact the provider if you have any questions.       Again, thank you for allowing me to participate in the care of your patient.        Sincerely,        CARLOS ELIZABETH CNP

## 2023-08-01 NOTE — PROGRESS NOTES
Gastroenterology CLINIC VISIT, RETURN PATIENT    CC/REFERRING PROVIDER: Referred Self  REASON FOR CONSULTATION: follow up    HPI: 52 year old male presented to GI clinic for follow up on lower abdominal pain and diarrhea since about one year ago. Patient reported up to 12 loose or mucousy stools a day on his worse days.  Lost about 15 lbs in the firstt 6 months since onset of his symptoms. CT scan was suggestive for colitis of sigmoid colon and diverticulosis. I referred the patient for colonoscopy. Due to a long waiting time (>60 days), a follow up abdominal CT was ordered. (See studies findings below). Symptomatic treatment with dicyclomine and loperamide was ordered.  Patient found to have acute diverticulitis, was treated with antibiotics, tolerated well.  Stated that his symptoms improved by about 75%. Still has occasional lower abdominal pains. Reported bouts of mushy stools in the morning, within the first 2-3 hours upon awakening. No nocturnal symptoms. Appetite had improved, but the patient continues watching his diet. No nausea or vomiting. No hematochezia.    PREVIOUS ENDOSCOPY:  5/15/2023 Colonoscopy  Findings:       The perianal and digital rectal examinations were normal.        Multiple small-mouthed diverticula were found in the sigmoid colon.        Erythema and edema was seen in association with the diverticular        opening. Sue-diverticular erythema was seen. Petechia were visualized        in association with the diverticular opening. Biopsies were taken with a        cold forceps for histology.        A 20 mm polypoid lesion was found in the sigmoid colon. The polyp was        semi-pedunculated. Biopsies were taken with a cold forceps for histology.        The exam was otherwise normal throughout the examined colon.        The terminal ileum appeared normal.     Final Diagnosis   A. RIGHT COLON, BIOPSY:  - Colonic mucosa with no significant histopathologic abnormality  - No evidence of  chronic active or microscopic colitis     B. LEFT COLON, BIOPSY:  - Colonic mucosa with no significant histopathologic abnormality  - No evidence of chronic active or microscopic colitis     C. SIGMOID COLON, BIOPSY:  - Colonic mucosa with erosion and reactive changes  - No evidence of chronic active or microscopic colitis     D. SIGMOID COLON, BIOPSY:  - Colonic mucosa with no significant histopathologic abnormality  - Negative for dysplasia       PERTINENT STUDIES Reviewed in EMR  5/15/2023 CT scan of abdomen  Impression  1.  Acute uncomplicated diverticulitis mid sigmoid colon.  2.  Hepatic steatosis, improved since prior.  3.  Interval decrease in overall stone burden in the right kidney. No hydronephrosis.    ROS: 10pt ROS performed and otherwise negative.    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    Kidney stones 9/16/2011       PREVIOUS ABDOMINAL/GYNECOLOGIC SURGERIES:    Past Surgical History:   Procedure Laterality Date    COLONOSCOPY N/A 5/15/2023    Procedure: COLONOSCOPY, WITH POLYPECTOMY AND BIOPSY;  Surgeon: Paula Fierro DO;  Location: [facility] OR    COLONOSCOPY WITH CO2 INSUFFLATION N/A 5/15/2023    Procedure: COLONOSCOPY, WITH CO2 INSUFFLATION;  Surgeon: Paula Fierro DO;  Location: [facility] OR    EXTRACORPOREAL SHOCK WAVE LITHOTRIPSY (ESWL)  .3.4.2016    GENITOURINARY SURGERY  Kidney Stones    21 Lithotripsy procedures    HERNIA REPAIR  2017    JOINT REPLACEMENT Right 03/2020    ORTHOPEDIC SURGERY  2015    right shoulder rotor cuff    TONSILLECTOMY  2008         PERTINENT MEDICATIONS:  Current Outpatient Medications   Medication Sig Dispense Refill    amLODIPine (NORVASC) 5 MG tablet TAKE 1 TABLET DAILY 90 tablet 1    clindamycin (CLEOCIN) 300 MG capsule TAKE 2 CAPSULES BY MOUTH 1 HOUR PRIOR TO DENTAL APPOINTMENT. 2 capsule 1    dicyclomine (BENTYL) 10 MG capsule Take 1  capsule (10 mg) by mouth 4 times daily as needed (as needed for abdominal cramping) 100 capsule 1    loperamide (IMODIUM A-D) 2 MG tablet Take 1 tablet (2 mg) by mouth 4 times daily as needed for diarrhea Take one tablet after 2 or more loose stools or one watery stool. Then, take half of the tablet as needed for consequent loose stools 30 tablet 1    metoprolol succinate ER (TOPROL XL) 50 MG 24 hr tablet TAKE 1 TABLET AT BEDTIME FOR BLOOD PRESSURE 90 tablet 2    sildenafil (REVATIO) 20 MG tablet 2-3 tabs daily as needed for ED 30 tablet 2       No other OTC/herbal/supplements reported by patient.    SOCIAL HISTORY:  Social History     Socioeconomic History    Marital status:      Spouse name: Not on file    Number of children: Not on file    Years of education: Not on file    Highest education level: Not on file   Occupational History    Not on file   Tobacco Use    Smoking status: Never    Smokeless tobacco: Former    Tobacco comments:     NON SMOKING HOME   Vaping Use    Vaping Use: Never used   Substance and Sexual Activity    Alcohol use: Yes     Comment: OCC    Drug use: No    Sexual activity: Yes     Partners: Female     Birth control/protection: None   Other Topics Concern    Parent/sibling w/ CABG, MI or angioplasty before 65F 55M? No     Service No    Blood Transfusions No    Caffeine Concern No    Occupational Exposure No    Hobby Hazards No    Sleep Concern No    Stress Concern No    Weight Concern No    Special Diet No    Back Care No    Exercise No    Bike Helmet No    Seat Belt Yes    Self-Exams No   Social History Narrative    Not on file     Social Determinants of Health     Financial Resource Strain: Not on file   Food Insecurity: Not on file   Transportation Needs: Not on file   Physical Activity: Not on file   Stress: Not on file   Social Connections: Not on file   Intimate Partner Violence: Not on file   Housing Stability: Not on file       FAMILY HISTORY:  Denies  colon/panc/esophageal/other GI CA, no other Luke or other HPS-related Breanna. No IBD/celiac, no other AI/liver/thyroid disease.    Family History   Problem Relation Age of Onset    Cancer Father 68        pancreatic cancer    Diabetes Father     Other Cancer Father        PHYSICAL EXAMINATION:  Vitals reviewed  There were no vitals taken for this visit.    General: Patient appears well in no acute distress.   Skin: No visualized rash or lesions on visualized skin  Eyes: EOMI, no erythema, sclera icterus or discharge noted  Resp: breathing comfortably without accessory muscle usage, speaking in full sentences, no cough. Lung sounds clear  Card: Regular and rhythmic S1 and S2. No gallop or rub. No murmur. No LE edema.  Abdomen: Active bowel sounds X 4 quadrants. Soft to palpation. Mild tenderness in the left lower quadrant.  No guarding or rebound tenderness   MSK: Appears to have normal range of motion based on visualized movements  Neurologic: No apparent tremors, facial movements symmetric  Psych: affect normal, alert and oriented      ASSESSMENT/PLAN:  52 year old male  presented  to GI clinic for a follow up on acute diverticulitis. Reported significant improvement in his symptoms after completing a course of antibiotics. Takes loperamide as needed for bouts of diarrhea, which usually occur in the morning. No red flag symptoms including fever, chills, further weight loss, or hematochezia.   We reviewed and discussed findings of CT scan and colonoscopy. Noted that his hepatic steatosis had improved.  Patient was educated on diverticulosis and recurrent diverticulitis. Suggested to start a low dose of a fiber supplement and gradually taper it up as tolerates ( 1/2 of teaspoon of metamucil to 1 teaspoon twice a day).  We talked about symptoms of IBS that could develop post-infection or colonic inflammation. Talked about time frame and typical recovery period. Discussed appropriate diet for that time. Advised the  patient to contact us in 4-5 weeks if diarrhea persists. May order short course of cholestyramine. Hopefully, added fiber will solidify his stools.  Instructed to take loperamide as needed.       ICD-10-CM    1. Acute diverticulitis  K57.92 psyllium (METAMUCIL/KONSYL) 58.6 % powder      2. Bilateral lower abdominal pain  R10.31     R10.32       3. Diarrhea, unspecified type  R19.7 psyllium (METAMUCIL/KONSYL) 58.6 % powder           RTC 6 months    Thank you for this consultation. It was a pleasure to participate in the care of this patient; please contact us with any further questions.    CARLOS Elizabeth, FNP-C  [facility]  Gastroenterology Department  [place], [place]    This note was created with Dragon voice recognition software, and while reviewed for accuracy, inadvertent minor typographic errors may occur. Please contact the provider if you have any questions.

## 2023-10-10 ENCOUNTER — DOCUMENTATION ONLY (OUTPATIENT)
Dept: FAMILY MEDICINE | Facility: CLINIC | Age: 53
End: 2023-10-10
Payer: COMMERCIAL

## 2023-10-10 DIAGNOSIS — Z12.5 SCREENING PSA (PROSTATE SPECIFIC ANTIGEN): ICD-10-CM

## 2023-10-10 DIAGNOSIS — Z13.6 CARDIOVASCULAR SCREENING; LDL GOAL LESS THAN 160: Primary | ICD-10-CM

## 2023-10-10 DIAGNOSIS — Z00.00 PREVENTATIVE HEALTH CARE: ICD-10-CM

## 2023-10-10 NOTE — PROGRESS NOTES
Marcus Delacruz has an upcoming lab appointment requesting PVL    Future Appointments   Date Time Provider Department Center   10/27/2023  7:15 AM AN LAB [facility]   12/12/2023  7:00 AM [facility] [place] [facility]         There is no order available. Please review and place either future orders or HMPO (Review of Health Maintenance Protocol Orders), as appropriate.    Health Maintenance Due   Topic    ANNUAL REVIEW OF HM ORDERS     HIV SCREENING     LIPID      Gudelia Baptiste

## 2023-10-27 ENCOUNTER — LAB (OUTPATIENT)
Dept: LAB | Facility: CLINIC | Age: 53
End: 2023-10-27
Payer: COMMERCIAL

## 2023-10-27 DIAGNOSIS — Z00.00 PREVENTATIVE HEALTH CARE: ICD-10-CM

## 2023-10-27 DIAGNOSIS — Z12.5 SCREENING PSA (PROSTATE SPECIFIC ANTIGEN): ICD-10-CM

## 2023-10-27 DIAGNOSIS — Z13.6 CARDIOVASCULAR SCREENING; LDL GOAL LESS THAN 160: ICD-10-CM

## 2023-10-27 LAB
ALBUMIN SERPL BCG-MCNC: 4.4 G/DL (ref 3.5–5.2)
ANION GAP SERPL CALCULATED.3IONS-SCNC: 12 MMOL/L (ref 7–15)
BUN SERPL-MCNC: 13.2 MG/DL (ref 6–20)
CALCIUM SERPL-MCNC: 9.5 MG/DL (ref 8.6–10)
CHLORIDE SERPL-SCNC: 101 MMOL/L (ref 98–107)
CHOLEST SERPL-MCNC: 166 MG/DL
CREAT SERPL-MCNC: 1.05 MG/DL (ref 0.67–1.17)
DEPRECATED HCO3 PLAS-SCNC: 27 MMOL/L (ref 22–29)
EGFRCR SERPLBLD CKD-EPI 2021: 84 ML/MIN/1.73M2
ERYTHROCYTE [DISTWIDTH] IN BLOOD BY AUTOMATED COUNT: 12.8 % (ref 10–15)
GLUCOSE SERPL-MCNC: 129 MG/DL (ref 70–99)
HCT VFR BLD AUTO: 46.5 % (ref 40–53)
HDLC SERPL-MCNC: 41 MG/DL
HGB BLD-MCNC: 15.4 G/DL (ref 13.3–17.7)
LDLC SERPL CALC-MCNC: 82 MG/DL
MCH RBC QN AUTO: 30.3 PG (ref 26.5–33)
MCHC RBC AUTO-ENTMCNC: 33.1 G/DL (ref 31.5–36.5)
MCV RBC AUTO: 91 FL (ref 78–100)
NONHDLC SERPL-MCNC: 125 MG/DL
PHOSPHATE SERPL-MCNC: 3 MG/DL (ref 2.5–4.5)
PLATELET # BLD AUTO: 197 10E3/UL (ref 150–450)
POTASSIUM SERPL-SCNC: 4.3 MMOL/L (ref 3.4–5.3)
PSA SERPL DL<=0.01 NG/ML-MCNC: 0.92 NG/ML (ref 0–3.5)
RBC # BLD AUTO: 5.09 10E6/UL (ref 4.4–5.9)
SODIUM SERPL-SCNC: 140 MMOL/L (ref 135–145)
TRIGL SERPL-MCNC: 217 MG/DL
WBC # BLD AUTO: 5.9 10E3/UL (ref 4–11)

## 2023-10-27 PROCEDURE — 80069 RENAL FUNCTION PANEL: CPT

## 2023-10-27 PROCEDURE — G0103 PSA SCREENING: HCPCS

## 2023-10-27 PROCEDURE — 36415 COLL VENOUS BLD VENIPUNCTURE: CPT

## 2023-10-27 PROCEDURE — 85027 COMPLETE CBC AUTOMATED: CPT

## 2023-10-27 PROCEDURE — 80061 LIPID PANEL: CPT

## 2023-12-12 ENCOUNTER — OFFICE VISIT (OUTPATIENT)
Dept: FAMILY MEDICINE | Facility: CLINIC | Age: 53
End: 2023-12-12
Payer: COMMERCIAL

## 2023-12-12 VITALS
RESPIRATION RATE: 16 BRPM | WEIGHT: 185.8 LBS | OXYGEN SATURATION: 97 % | HEART RATE: 78 BPM | HEIGHT: 69 IN | DIASTOLIC BLOOD PRESSURE: 72 MMHG | BODY MASS INDEX: 27.52 KG/M2 | SYSTOLIC BLOOD PRESSURE: 137 MMHG | TEMPERATURE: 99.5 F

## 2023-12-12 DIAGNOSIS — Z00.00 ROUTINE HISTORY AND PHYSICAL EXAMINATION OF ADULT: Primary | ICD-10-CM

## 2023-12-12 DIAGNOSIS — R73.9 HYPERGLYCEMIA: ICD-10-CM

## 2023-12-12 DIAGNOSIS — I10 ESSENTIAL HYPERTENSION: ICD-10-CM

## 2023-12-12 PROCEDURE — 99396 PREV VISIT EST AGE 40-64: CPT | Performed by: FAMILY MEDICINE

## 2023-12-12 RX ORDER — TADALAFIL 5 MG/1
5 TABLET ORAL EVERY 24 HOURS
COMMUNITY

## 2023-12-12 RX ORDER — AMLODIPINE BESYLATE 5 MG/1
5 TABLET ORAL DAILY
Qty: 90 TABLET | Refills: 3 | Status: SHIPPED | OUTPATIENT
Start: 2023-12-12 | End: 2023-12-27

## 2023-12-12 ASSESSMENT — ENCOUNTER SYMPTOMS
NAUSEA: 0
WEAKNESS: 0
FREQUENCY: 0
EYE PAIN: 0
DYSURIA: 0
HEMATURIA: 0
MYALGIAS: 0
CHILLS: 0
PALPITATIONS: 0
SORE THROAT: 0
PARESTHESIAS: 0
ARTHRALGIAS: 0
HEARTBURN: 0
HEADACHES: 0
CONSTIPATION: 0
ABDOMINAL PAIN: 0
HEMATOCHEZIA: 0
NERVOUS/ANXIOUS: 0
DIARRHEA: 0
SHORTNESS OF BREATH: 0
JOINT SWELLING: 0
DIZZINESS: 0
COUGH: 0
FEVER: 0

## 2023-12-12 ASSESSMENT — PAIN SCALES - GENERAL: PAINLEVEL: NO PAIN (0)

## 2023-12-12 NOTE — PROGRESS NOTES
SUBJECTIVE:   Marcus is a 53 year old, presenting for the following:  Physical  Follow-up htn,high triglycerides,kidney stones and hyperglycemia.   Born with one kidney. History fatty liver in past. Negative HepB/C in past   No family history colon issues/autoimmune issue   Family history dm-  dad.  Patient was having beers/night before. Rare pop.  Italian bread. Some meat.  Outside blood pressure reading ok.  Exercise regularly. No chest pain or shortness of breath. Cardiac - no weight lfting.   No nausea, vomiting or diarrhea or black/bloody stools.   History diverticulitis episodes. No urine changes or hematuria.  History kidney stone - seen urology. No std concerns. No testicle pain/masses/hernia.  2 kids 19,13yo and home/work ok.   Sleep overall ok. No daytime fatigue. Dermatology yearly. Drinks milk. Hockey.         12/12/2023     6:59 AM   Additional Questions   Roomed by JANET Mcintosh CMA       Healthy Habits:     Getting at least 3 servings of Calcium per day:  Yes    Bi-annual eye exam:  Yes    Dental care twice a year:  Yes    Sleep apnea or symptoms of sleep apnea:  None    Diet:  Regular (no restrictions)    Frequency of exercise:  4-5 days/week    Duration of exercise:  15-30 minutes    Taking medications regularly:  Yes    Medication side effects:  None    Additional concerns today:  No        Social History     Tobacco Use    Smoking status: Never    Smokeless tobacco: Former    Tobacco comments:     NON SMOKING HOME   Substance Use Topics    Alcohol use: Yes     Comment: OCC             12/12/2023     6:52 AM   Alcohol Use   Prescreen: >3 drinks/day or >7 drinks/week? No       Last PSA:   PSA   Date Value Ref Range Status   10/29/2020 0.84 0 - 4 ug/L Final     Comment:     Assay Method:  Chemiluminescence using Siemens Vista analyzer     Prostate Specific Antigen Screen   Date Value Ref Range Status   10/27/2023 0.92 0.00 - 3.50 ng/mL Final   11/22/2021 1.13 0.00 - 4.00 ug/L Final         Reviewed and  "updated as needed this visit by clinical staff   Tobacco  Allergies  Meds              Reviewed and updated as needed this visit by Provider                   Review of Systems   Constitutional:  Negative for chills and fever.   HENT:  Negative for congestion, ear pain, hearing loss and sore throat.    Eyes:  Negative for pain and visual disturbance.   Respiratory:  Negative for cough and shortness of breath.    Cardiovascular:  Negative for chest pain, palpitations and peripheral edema.   Gastrointestinal:  Negative for abdominal pain, constipation, diarrhea, heartburn, hematochezia and nausea.   Genitourinary:  Negative for dysuria, frequency, genital sores, hematuria, impotence, penile discharge and urgency.   Musculoskeletal:  Negative for arthralgias, joint swelling and myalgias.   Skin:  Negative for rash.   Neurological:  Negative for dizziness, weakness, headaches and paresthesias.   Psychiatric/Behavioral:  Negative for mood changes. The patient is not nervous/anxious.          OBJECTIVE:   /72   Pulse 78   Temp 99.5  F (37.5  C) (Oral)   Resp 16   Ht 1.753 m (5' 9\")   Wt 84.3 kg (185 lb 12.8 oz)   SpO2 97%   BMI 27.44 kg/m     Physical Exam  GENERAL: healthy, alert and no distress  EYES: Eyes grossly normal to inspection, PERRL and conjunctivae and sclerae normal  HENT: ear canals and TM's normal, nose and mouth without ulcers or lesions  NECK: no adenopathy, no asymmetry, masses, or scars and thyroid normal to palpation  RESP: lungs clear to auscultation - no rales, rhonchi or wheezes  BREAST: normal without masses, tenderness or nipple discharge and no palpable axillary masses or adenopathy  CV: regular rate and rhythm, normal S1 S2, no S3 or S4, no murmur, click or rub, no peripheral edema and peripheral pulses strong  ABDOMEN: soft, nontender, no hepatosplenomegaly, no masses and bowel sounds normal   (male): patient deferred /rectal exams  MS: no gross musculoskeletal defects " "noted, no edema  SKIN: no suspicious lesions or rashes  NEURO: Normal strength and tone, mentation intact and speech normal  PSYCH: mentation appears normal, affect normal/bright  LYMPH: no cervical, supraclavicular, axillary adenopathy    ASSESSMENT/PLAN:   ASSESSMENT / PLAN:  (Z00.00) Routine history and physical examination of adult  (primary encounter diagnosis)  Comment: generally healthy and normal exam/labs except sugars  Plan: Reviewed self mole/testicle check handout.  Seeing dermatology.     (I10) Essential hypertension  Comment: stable  Plan: amLODIPine (NORVASC) 5 MG tablet        Continue self-monitor/exercise and lower sodium. Chest pain or shortness of breath to er    (R73.9) Hyperglycemia  Comment: pre-dm  Plan: greatly lower carbs in diet/exerciise/intermittent fasting and add weight lifting.         COUNSELING:   Reviewed preventive health counseling, as reflected in patient instructions       Regular exercise       Healthy diet/nutrition       Vision screening       Aspirin prophylaxis        Alcohol Use        Family planning       Colorectal cancer screening       Prostate cancer screening       Osteoporosis prevention/bone health      BMI:   Estimated body mass index is 27.44 kg/m  as calculated from the following:    Height as of this encounter: 1.753 m (5' 9\").    Weight as of this encounter: 84.3 kg (185 lb 12.8 oz).   Weight management plan: Discussed healthy diet and exercise guidelines      He reports that he has never smoked. He has quit using smokeless tobacco.            Rj Mejia MD  [facility]  "

## 2023-12-12 NOTE — COMMUNITY RESOURCES LIST (ENGLISH)
12/12/2023   [facility]  [facility]A  For questions about this resource list or additional care needs, please contact your primary care clinic or care manager.  Phone: [phone]   Email: N/A   Address: [facility] [place] [address]   Hours: N/A        Hotlines and Helplines       Hotline - Housing crisis  1  [facility] Housing Resource Line Distance: 4.62 miles      Phone/Virtual   [address] [place] [address]  Language: English  Hours: Mon - Sun Open 24 Hours   Phone: (297) 593-9312 Website: https://www.[facility]./2689/Basic-Needs     2  [facility] Distance: 19.47 miles      Phone/Virtual   [address] [place] [address]  Language: English  Hours: Mon - Sun Open 24 Hours   Phone: (117) 835-6858 Email: communications@[facility] [place].org Website: [email or web address] [email or web address]/oursaviourshousing/          Housing       Coordinated Entry access point  3  [facility]  Office - [facility] Distance: 8.16 miles      Phone/Virtual   [address] [place] [address]  Language: English  Hours: Mon - Fri 8:30 AM - 12:00 PM , Mon - Fri 1:00 PM - 4:00 PM  Fees: Free   Phone: (733) 410-5937 Ext.2 Email: [email or web address].org Website: https://www.[facility].org/usn/     4  [facility] ([facility] - Housing Services Distance: 19.56 miles      In-Person   [address] [place] [address]  Language: English  Hours: Mon - Fri 9:00 AM - 5:00 PM  Fees: Free   Phone: (533) 442-6764 Email: housing@[facility].org Website: http://www.[facility].org/housing     Drop-in center or day shelter  5  [employer] [employer] Distance: 17.78 miles      In-Person   [address] [place] [address]  Language: English, Hmong, Turkmen, Uzbek  Hours: Mon - Thu 8:30 AM - 4:30 PM , Sat - Sun 9:00 AM - 12:00 PM  Fees: Free   Phone: (748) 239-5256 Email: info@[employer].org Website: https://[employer].org/     6   [facility] and [facility] Distance: 19.04 miles      In-Person   [address] [place] [address]  Language: English, Tristanian, Hungarian  Hours: Mon - Sat 7:00 AM - 3:00 PM  Fees: Free, Self Pay   Phone: (128) 930-5026 Email: info@[employer] Website: https://www.[employer]/locations/[facility]/     Housing search assistance  7  [facility] ([facility] - [facility] Rental Housing Distance: 8.16 miles      In-Person, Phone/Virtual   [address] [place] [address]  Language: English  Hours: Mon - Fri 8:00 AM - 4:30 PM  Fees: Free   Phone: (532) 189-2309 Email: accap@[facility].org Website: http://www.accap.[employer]     8  Neighborhood Assistance [employer] Distance: 11.87 miles      Phone/Virtual   [address] [facility] [address] [place] [address]  Language: English, Hungarian  Hours: Mon - Fri 9:00 AM - 5:00 PM  Fees: Free   Phone: (829) 467-9672 Email: services@[employer] Website: https://www.[employer]     Shelter for families  9  [facility] Distance: 16.46 miles      In-Person   [address] [place] [address]  Language: English  Hours: Mon - Fri 3:00 PM - 9:00 AM , Sat - Sun Open 24 Hours  Fees: Free   Phone: (394) 336-3569 Ext.1 Website: https://www.[employer].org/2020/07/03/emergency-family-shelter/     Shelter for individuals  10  [facility] and [facility] Distance: 18.09 miles      In-Person   [address] [place] [address]  Language: English  Hours: Mon - Sun 5:00 PM - 10:00 AM  Fees: Free, Self Pay   Phone: (449) 254-6528 Email: info@[employer] Website: https://www.[employer]/locations/higher-ground-shelter/     11  [facility] Distance: 18.15 miles      In-Person   [address] [place] [address]  Language: English  Hours: Mon - Fri 4:00 PM - 9:00 AM   Fees: Free   Phone: (931) 156-9290 Email: daniel@[facility].org Website: https://[facility].org/[facility]/          Important Numbers & Websites       Emergency Services   911  [facility] Services   311  Poison Control   (221) 855-8871  Suicide Prevention Lifeline   (641) 871-7745 (TALK)  Child Abuse Hotline   (932) 629-8311 (4-A-Child)  Sexual Assault Hotline   (492) 364-7138 (HOPE)  National Runaway Safeline   (282) 296-1133 (RUNAWAY)  All-Options Talkline   (869) 389-3577  Substance Abuse Referral   (905) 359-9335 (HELP)

## 2023-12-12 NOTE — COMMUNITY RESOURCES LIST (ENGLISH)
12/12/2023   [facility] - Outpatient Clinics  N/A  For additional resource needs, please contact your health insurance member services or your primary care team.  Phone: [phone]   Email: N/A   Address: [address] [place] [address]   Hours: N/A        Hotlines and Helplines       Hotline - Housing crisis  1  [facility] Housing Resource Line Distance: 4.62 miles      Phone/Virtual   [address] [place] [address]  Language: English  Hours: Mon - Sun Open 24 Hours   Phone: (236) 323-7649 Website: https://www.[facility]./2689/Basic-Needs     2  [facility] Distance: 19.47 miles      Phone/Virtual   [address] [place] [address]  Language: English  Hours: Mon - Sun Open 24 Hours   Phone: (658) 250-9682 Email: communications@[facility].org Website: https://[facility].org/oursaviourshousing/          Housing       Coordinated Entry access point  3  [facility]  Office - [facility] Distance: 8.16 miles      Phone/Virtual   [address] [place] [address]  Language: English  Hours: Mon - Fri 8:30 AM - 12:00 PM , Mon - Fri 1:00 PM - 4:00 PM  Fees: Free   Phone: (999) 583-1970 Ext.2 Email: abdulkadir@[facility].org Website: https://www.[facility] Group[facility]ee[facility] Medical CenterAudioEyeusa.org/usn/     4  [facility] ([facility] - Housing Services Distance: 19.56 miles      In-Person   [address] [place] [address]  Language: English  Hours: Mon - Fri 9:00 AM - 5:00 PM  Fees: Free   Phone: (544) 200-6310 Email: housing@[facility].org Website: http://www.[facility].org/housing     Drop-in center or day shelter  5  [facility] Distance: 17.78 miles      In-Person   [address] [place] [address]  Language: English, Hmong, Citizen of the [place], Maltese  Hours: Mon - Thu 8:30 AM - 4:30 PM , Sat - Sun 9:00 AM - 12:00 PM  Fees: Free   Phone: (237) 933-3208 Email: info@[employer].org Website: https://[employer].org/     6  [facility]  [facility] Distance: 19.04 miles      In-Person   [address] [place] [address]  Language: English, Montenegrin, [place] citizen  Hours: Mon - Sat 7:00 AM - 3:00 PM  Fees: Free, Self Pay   Phone: (521) 612-3514 Email: info@[employer] Website: https://www.[employer]/locations/opportunity-center/     Housing search assistance  7  [employer] - Online housing search assistance Distance: 17.99 miles      Phone/Virtual   [address] [place] [address]  Language: English, Hmong, Montenegrin, [place] citizen  Hours: Mon - Sun Open 24 Hours   Phone: (641) 392-7503 Email: info@[employer].org Website: http://www.[employer].org/     8  [employer] - https://TheySay/ Distance: 18.28 miles      Phone/Virtual   [address] [place] [address]  Language: English  Hours: Mon - Sun Open 24 Hours   Email: info@[employer]dem[employer] | [employer] Website: https://TheySay     Shelter for families  9  [facility] Distance: 16.46 miles      In-Person   [address] [place] [address]  Language: English  Hours: Mon - Fri 3:00 PM - 9:00 AM , Sat - Sun Open 24 Hours  Fees: Free   Phone: (809) 629-5926 Ext.1 Website: https://www.saintandWannafuns.org/2020/07/03/emergency-family-shelter/     Shelter for individuals  10  [facility] Distance: 18.09 miles      In-Person   [address] [place] [address]  Language: English  Hours: Mon - Sun 5:00 PM - 10:00 AM  Fees: Free, Self Pay   Phone: (800) 530-1042 Email: info@[employer] Website: https://www.[employer]/locations/higher-ground-shelter/     11  [facility] Distance: 18.15 miles      In-Person   [address] [place] [address]  Language: English  Hours: Mon - Fri 4:00 PM - 9:00 AM  Fees: Free   Phone: (460) 407-3410 Email:  daniel@[facility].salvationarmy.org Website: https://[facility].salvationarmy.org/[facility]/          Important Numbers & Websites       [place] [address] 211itedway.org  Poison Control   (272) 833-9748 [place]  Suicide and Crisis Lifeline   988 988[facility].org  Childhelp [place] Child Abuse Hotline   324.712.2851 Childhelphotline.org  [place] Sexual Assault Hotline   (911) 912-3179 (HOPE) [facility] Runaway Safeline   (266) 730-1927 (RUNAWAY) [facility].org  Pregnancy & Postpartum Support [place]   Call/text 754-732-5254 Ppsupportmn.org  Substance Abuse National Helpline ([facility]   576-164-HELP (0423) Findtreatment.gov  Emergency Services   911

## 2023-12-27 DIAGNOSIS — I10 ESSENTIAL HYPERTENSION: ICD-10-CM

## 2023-12-27 RX ORDER — AMLODIPINE BESYLATE 5 MG/1
5 TABLET ORAL DAILY
Qty: 90 TABLET | Refills: 3 | Status: SHIPPED | OUTPATIENT
Start: 2023-12-27

## 2024-02-12 DIAGNOSIS — I10 HYPERTENSION GOAL BP (BLOOD PRESSURE) < 140/90: ICD-10-CM

## 2024-02-12 RX ORDER — METOPROLOL SUCCINATE 50 MG/1
TABLET, EXTENDED RELEASE ORAL
Qty: 90 TABLET | Refills: 2 | Status: SHIPPED | OUTPATIENT
Start: 2024-02-12

## 2024-05-03 ENCOUNTER — TRANSFERRED RECORDS (OUTPATIENT)
Dept: HEALTH INFORMATION MANAGEMENT | Facility: CLINIC | Age: 54
End: 2024-05-03
Payer: COMMERCIAL

## 2024-05-15 ENCOUNTER — MYC MEDICAL ADVICE (OUTPATIENT)
Dept: GASTROENTEROLOGY | Facility: CLINIC | Age: 54
End: 2024-05-15
Payer: COMMERCIAL

## 2024-06-23 NOTE — PATIENT INSTRUCTIONS
It was a pleasure taking care of you today.  I've included a brief summary of our discussion and care plan from today's visit below.  Please review this information with your primary care provider.  ______________________________________________________________________    My recommendations are summarized as follows:    Continue fiber supplement daily.  Switch from Metamucil to Citrucel or Benefiber, which can cause less bloating.    2.  During a flareup of diverticulitis, get on clear liquids diet for 12 to 24 hours.  Then, slowly advance your diet.  Stay on low fiber diet for about 4 to 6 weeks after a flareup.    3.  Maintain proper hydration.    4.  Plan for a follow-up colonoscopy in 2028 patient.    Return to GI Clinic in 1 year to review your progress.    ______________________________________________________________________    DIVERTICULOSIS  A diverticulum is a pouch-like structure that can form through points of weakness in the muscular wall of the colon (ie, at points where blood vessels pass through the wall).   Diverticulosis merely describes the presence of diverticula. Diverticulosis is often found during a test done for other reasons, such as flexible sigmoidoscopy, colonoscopy, or barium enema. Most people with diverticulosis have no symptoms and will remain symptom free for the rest of their lives.     People with diverticulosis who do not have symptoms do not require treatment. However, most clinicians recommend increasing fiber in the diet, which can help to bulk the stools and possibly prevent the development of new diverticula, diverticulitis, or diverticular bleeding. Fiber is not proven to prevent these conditions in all patients but may help to control recurrent episodes in some.   Fruits and vegetables are a good source of fiber. Patients with diverticular disease have historically been advised to avoid whole pieces of fiber (such as seeds, corn, and nuts) because of concern that these  foods could cause an episode of diverticulitis. However, this belief is completely unproven. We do not suggest that patients with diverticulosis avoid seeds, corn, or nuts.     Approximately 15 to 25 percent of people with diverticulosis may develop diverticulitis, which is inflammation of diverticulum.  This may be caused by increased pressure within the colon or by hardened particles of stool, which can become lodged within the diverticulum. The symptoms of diverticulitis depend upon the degree of inflammation present. The most common symptom is pain in the left lower abdomen. Other symptoms can include nausea and vomiting, constipation, diarrhea, and urinary symptoms such as pain or burning when urinating or the frequent need to urinate.   If your disease is mild and you are otherwise healthy, you might be treated at home. This usually involves a liquid diet and pain-relieving medication. However, if you develop one or more of the following signs or symptoms, you should seek immediate medical attention:  ?Temperature >100.1 F (38 C)  ?Worsening or severe abdominal pain  ?Inability to tolerate fluids      ____________________________________________________________________        Who do I call with any questions after my visit?  Please be in touch if there are any further questions that arise following today's visit.  There are multiple ways to contact your gastroenterology care team.      During business hours, you may reach a Gastroenterology nurse at [phone], option [phone].     To schedule or reschedule an appointment, please call [phone].   To schedule your imaging studies (CT, MRI, ultrasound)  call [phone] (or toll-free # [phone])  To schedule your lab work at [facility], please call [phone]    You can always send a secure message through "[employer].  "[employer] messages are answered by your nurse or doctor typically within 24 hours.  Please allow extra time on  weekends and holidays.      For urgent/emergent questions after business hours, you may reach the on-call GI Fellow by contacting the [facility]  at [phone].    In order for your refill to be processed in a timely fashion, it is your responsibility to ensure you follow the recommendations from your provider regarding your laboratory studies and follow up appointments.       How will I get the results of any tests ordered?    You will receive all of your results.  If you have signed up for [employer], any tests ordered at your visit will be available to you after your physician reviews them.  Typically this takes 1-2 weeks.  If there are urgent results that require a change in your care plan, your physician or nurse will call you to discuss the next steps.   What is Bia?  Bia is a secure way for you to access all of your healthcare records from the [facility].  It is a web based computer program, so you can sign on to it from any location.  It also allows you to send secure messages to your care team.  I recommend signing up for Bia access if you have not already done so and are comfortable with using a computer.    How to I schedule a follow-up visit?  If you did not schedule a follow-up visit today, please call [phone] to schedule a follow-up office visit.      Sincerely,  MARLENY Elizabeth,  [facility],  Division of Gastroenterology   ([facility])

## 2024-06-23 NOTE — PROGRESS NOTES
"  [address] [address]  Phone: [phone]    Patient:  Marcus Delacruz, Date of birth 1970    Referring Provider: Dr. Mejia      Gastroenterology CLINIC VISIT, RETURN PATIENT    REASON FOR CONSULTATION: follow up    HPI: 53 year old male presented to GI clinic for a follow up. The patient was seen one year ago for intermittent lower abdominal pain and diarrhea since about 2 years ago. Patient reported up to 12 loose or mucousy stools a day on his worse days.  Lost about 15 lbs in the firstt 6 months since onset of his symptoms. CT scan was suggestive for colitis of sigmoid colon and diverticulosis. I referred the patient for colonoscopy. He was given a course of antibiotics that resulted in significant improvement of his symptoms.  Today, patient reported occasional \"flareups\" of left-sided abdominal pain and diarrhea.  He notices correlation of this episodes with his diet.  Stated that spicy foods, popcorn or coffee would aggravate his discomfort.  Has been having regular bowel movements every morning.  Denies hematochezia or melena.  No nausea or vomiting.  Reported some bloating.  Having difficulty to tolerate Metamucil daily due to intestinal gas.  Takes it once a week.  No further weight loss.  Appetite is good.     PREVIOUS ENDOSCOPY:  5/15/2023 Colonoscopy  Findings:       The perianal and digital rectal examinations were normal.        Multiple small-mouthed diverticula were found in the sigmoid colon.        Erythema and edema was seen in association with the diverticular        opening. Sue-diverticular erythema was seen. Petechia were visualized        in association with the diverticular opening. Biopsies were taken with a        cold forceps for histology.        A 20 mm polypoid lesion was found in the sigmoid colon. The polyp was        semi-pedunculated. Biopsies were taken with a cold forceps for histology.        The exam was " otherwise normal throughout the examined colon.        The terminal ileum appeared normal.      Final Diagnosis   A. RIGHT COLON, BIOPSY:  - Colonic mucosa with no significant histopathologic abnormality  - No evidence of chronic active or microscopic colitis     B. LEFT COLON, BIOPSY:  - Colonic mucosa with no significant histopathologic abnormality  - No evidence of chronic active or microscopic colitis     C. SIGMOID COLON, BIOPSY:  - Colonic mucosa with erosion and reactive changes  - No evidence of chronic active or microscopic colitis     D. SIGMOID COLON, BIOPSY:  - Colonic mucosa with no significant histopathologic abnormality  - Negative for dysplasia         PERTINENT STUDIES Reviewed in EMR  5/15/2023 CT scan of abdomen  Impression  1.  Acute uncomplicated diverticulitis mid sigmoid colon.  2.  Hepatic steatosis, improved since prior.  3.  Interval decrease in overall stone burden in the right kidney. No hydronephrosis.      ROS: 10pt ROS performed and otherwise negative.    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    Kidney stones 9/16/2011       PREVIOUS ABDOMINAL/GYNECOLOGIC SURGERIES:  Past Surgical History:   Procedure Laterality Date    COLONOSCOPY N/A 5/15/2023    Procedure: COLONOSCOPY, WITH POLYPECTOMY AND BIOPSY;  Surgeon: Paula Fierro DO;  Location: [facility] OR    COLONOSCOPY WITH CO2 INSUFFLATION N/A 5/15/2023    Procedure: COLONOSCOPY, WITH CO2 INSUFFLATION;  Surgeon: Paula Fierro DO;  Location: [facility] OR    EXTRACORPOREAL SHOCK WAVE LITHOTRIPSY (ESWL)  .3.4.2016    GENITOURINARY SURGERY  Kidney Stones    21 Lithotripsy procedures    HERNIA REPAIR  2017    JOINT REPLACEMENT Right 03/2020    ORTHOPEDIC SURGERY  2015    right shoulder rotor cuff    TONSILLECTOMY  2008         PERTINENT MEDICATIONS:  Current Outpatient Medications   Medication Sig Dispense Refill     amLODIPine (NORVASC) 5 MG tablet Take 1 tablet (5 mg) by mouth daily 90 tablet 3    metoprolol succinate ER (TOPROL XL) 50 MG 24 hr tablet TAKE 1 TABLET AT BEDTIME FOR BLOOD PRESSURE 90 tablet 2    psyllium (METAMUCIL/KONSYL) 58.6 % powder Take 6 g (1 teaspoonful) by mouth daily 425 g 1    tadalafil (CIALIS) 5 MG tablet Take 5 mg by mouth every 24 hours           SOCIAL HISTORY:  Social History     Socioeconomic History    Marital status:      Spouse name: Not on file    Number of children: Not on file    Years of education: Not on file    Highest education level: Not on file   Occupational History    Not on file   Tobacco Use    Smoking status: Never    Smokeless tobacco: Former    Tobacco comments:     NON SMOKING HOME   Vaping Use    Vaping status: Never Used   Substance and Sexual Activity    Alcohol use: Yes     Comment: OCC    Drug use: No    Sexual activity: Yes     Partners: Female     Birth control/protection: None   Other Topics Concern    Parent/sibling w/ CABG, MI or angioplasty before 65F 55M? No     Service No    Blood Transfusions No    Caffeine Concern No    Occupational Exposure No    Hobby Hazards No    Sleep Concern No    Stress Concern No    Weight Concern No    Special Diet No    Back Care No    Exercise No    Bike Helmet No    Seat Belt Yes    Self-Exams No   Social History Narrative    Not on file     Social Determinants of Health     Financial Resource Strain: Low Risk  (12/12/2023)    Financial Resource Strain     Within the past 12 months, have you or your family members you live with been unable to get utilities (heat, electricity) when it was really needed?: No   Food Insecurity: Low Risk  (12/12/2023)    Food Insecurity     Within the past 12 months, did you worry that your food would run out before you got money to buy more?: No     Within the past 12 months, did the food you bought just not last and you didn t have money to get more?: No   Transportation Needs: Low Risk   "(12/12/2023)    Transportation Needs     Within the past 12 months, has lack of transportation kept you from medical appointments, getting your medicines, non-medical meetings or appointments, work, or from getting things that you need?: No   Physical Activity: Not on file   Stress: Not on file   Social Connections: Not on file   Interpersonal Safety: Low Risk  (12/12/2023)    Interpersonal Safety     Do you feel physically and emotionally safe where you currently live?: Yes     Within the past 12 months, have you been hit, slapped, kicked or otherwise physically hurt by someone?: No     Within the past 12 months, have you been humiliated or emotionally abused in other ways by your partner or ex-partner?: No   Housing Stability: High Risk (12/12/2023)    Housing Stability     Do you have housing? : No     Are you worried about losing your housing?: No       FAMILY HISTORY:  Denies colon/panc/esophageal/other GI CA, no other Luke or other HPS-related Breanna. No IBD/celiac, no other AI/liver/thyroid disease.    Family History   Problem Relation Age of Onset    Cancer Father 68        pancreatic cancer    Diabetes Father     Other Cancer Father        PHYSICAL EXAMINATION:  Vitals reviewed  /84 (BP Location: Right arm, Patient Position: Sitting, Cuff Size: Adult Regular)   Pulse 76   Ht 1.753 m (5' 9\")   Wt 83.9 kg (185 lb)   BMI 27.32 kg/m      General: Patient appears well in no acute distress.    Skin: No visualized rash or lesions on visualized skin  HEENT:    EOMI, no erythema, sclera icterus or discharge noted.  Mouth mucosa intact, pink, moist  No cervical or supraclavicular lymphadenopathy. Thyroid gland not enlarged.  Resp: breathing comfortably without accessory muscle usage, speaking in full sentences, no cough. Lung sounds clear  Card: Regular and rhythmic S1 and S2. No gallop or rub. No murmur.  No LE edema.  Abdomen: Active bowel sounds X 4 quadrants. Soft to palpation.  No guarding or rebound " tenderness. Hutchison's sign negative.  MSK: Appears to have normal range of motion based on visualized movements  Neurologic: No apparent tremors, facial movements symmetric  Psych: affect normal, alert and oriented      ASSESSMENT/PLAN:    ICD-10-CM    1. Diverticular disease of colon  K57.30 psyllium (METAMUCIL/KONSYL) 58.6 % powder      2. Abdominal pain, generalized  R10.84       3. Fatty liver, alcoholic  K70.0       4. Abdominal bloating  R14.0       5. Acute diverticulitis  K57.92       6. Diarrhea, unspecified type  R19.7 psyllium (METAMUCIL/KONSYL) 58.6 % powder         53 year old male  presented to GI clinic for a follow-up on diverticular disease.  His last episode of acute diverticulitis, confirmed by CT scan, occurred in  in August 2023.  Uncomplicated case.  Patient received an antibiotic treatment, which helped.   Today, patient reported feeling well, he denies any GI symptoms.  Admits to occasional flareups of left-sided abdominal pain with associated abdominal bloating and diarrhea for a few days.  Stated he learned to abstain certain foods that aggravate his symptoms.  Had colonoscopy in May 2023 which revealed multiple small mouth diverticula in sigmoid colon.  There was questionable 20 mm polypoid lesion in the sigmoid colon, thought to be related to acute inflammation.  Will plan to have a follow-up study in 2028.  Patient concerned about abdominal bloating when taking a fiber supplement.  Suggested to switch from Metamucil to Citrucel or Benefiber.  Patient was educated on management of acute diverticulitis and the red flag symptoms that would prompt patient to see medical attention.  Patient verbalized understanding and appreciation of care provided. Stated that all of the questions were answered to her/his satisfaction.  RTC in 1 year    Thank you for this consultation. It was a pleasure to participate in the care of this patient; please contact us with any further questions.    Rashmi Colmenares  CARLOS, DONAL-C  Division of Gastroenterology  [facility], [place], [place]    This note was created with Dragon voice recognition software, and while reviewed for accuracy, inadvertent minor typographic errors may occur. Please contact the provider if you have any questions.

## 2024-06-24 ENCOUNTER — OFFICE VISIT (OUTPATIENT)
Dept: GASTROENTEROLOGY | Facility: CLINIC | Age: 54
End: 2024-06-24
Attending: NURSE PRACTITIONER
Payer: COMMERCIAL

## 2024-06-24 VITALS
HEART RATE: 76 BPM | DIASTOLIC BLOOD PRESSURE: 84 MMHG | SYSTOLIC BLOOD PRESSURE: 136 MMHG | HEIGHT: 69 IN | BODY MASS INDEX: 27.4 KG/M2 | WEIGHT: 185 LBS

## 2024-06-24 DIAGNOSIS — R19.7 DIARRHEA, UNSPECIFIED TYPE: ICD-10-CM

## 2024-06-24 DIAGNOSIS — K70.0 FATTY LIVER, ALCOHOLIC: ICD-10-CM

## 2024-06-24 DIAGNOSIS — K57.92 ACUTE DIVERTICULITIS: ICD-10-CM

## 2024-06-24 DIAGNOSIS — K57.30 DIVERTICULAR DISEASE OF COLON: Primary | ICD-10-CM

## 2024-06-24 DIAGNOSIS — R10.84 ABDOMINAL PAIN, GENERALIZED: ICD-10-CM

## 2024-06-24 DIAGNOSIS — R14.0 ABDOMINAL BLOATING: ICD-10-CM

## 2024-06-24 PROCEDURE — 99214 OFFICE O/P EST MOD 30 MIN: CPT | Performed by: NURSE PRACTITIONER

## 2024-06-24 ASSESSMENT — PAIN SCALES - GENERAL: PAINLEVEL: NO PAIN (0)

## 2024-06-24 NOTE — LETTER
"6/24/2024      Marcus Delacruz  [address] [facility] [address]      Dear Colleague,    Thank you for referring your patient, Marcus Delacruz, to the [facility]. Please see a copy of my visit note below.      [facility]  [address] [address]  Phone: [phone]    Patient:  Marcus Delacruz, Date of birth 1970    Referring Provider: Dr. Mejia      Gastroenterology CLINIC VISIT, RETURN PATIENT    REASON FOR CONSULTATION: follow up    HPI: 53 year old male presented to GI clinic for a follow up. The patient was seen one year ago for intermittent lower abdominal pain and diarrhea since about 2 years ago. Patient reported up to 12 loose or mucousy stools a day on his worse days.  Lost about 15 lbs in the firstt 6 months since onset of his symptoms. CT scan was suggestive for colitis of sigmoid colon and diverticulosis. I referred the patient for colonoscopy. He was given a course of antibiotics that resulted in significant improvement of his symptoms.  Today, patient reported occasional \"flareups\" of left-sided abdominal pain and diarrhea.  He notices correlation of this episodes with his diet.  Stated that spicy foods, popcorn or coffee would aggravate his discomfort.  Has been having regular bowel movements every morning.  Denies hematochezia or melena.  No nausea or vomiting.  Reported some bloating.  Having difficulty to tolerate Metamucil daily due to intestinal gas.  Takes it once a week.  No further weight loss.  Appetite is good.     PREVIOUS ENDOSCOPY:  5/15/2023 Colonoscopy  Findings:       The perianal and digital rectal examinations were normal.        Multiple small-mouthed diverticula were found in the sigmoid colon.        Erythema and edema was seen in association with the diverticular        opening. Sue-diverticular erythema was seen. Petechia were visualized        in association with the diverticular opening. " Biopsies were taken with a        cold forceps for histology.        A 20 mm polypoid lesion was found in the sigmoid colon. The polyp was        semi-pedunculated. Biopsies were taken with a cold forceps for histology.        The exam was otherwise normal throughout the examined colon.        The terminal ileum appeared normal.      Final Diagnosis   A. RIGHT COLON, BIOPSY:  - Colonic mucosa with no significant histopathologic abnormality  - No evidence of chronic active or microscopic colitis     B. LEFT COLON, BIOPSY:  - Colonic mucosa with no significant histopathologic abnormality  - No evidence of chronic active or microscopic colitis     C. SIGMOID COLON, BIOPSY:  - Colonic mucosa with erosion and reactive changes  - No evidence of chronic active or microscopic colitis     D. SIGMOID COLON, BIOPSY:  - Colonic mucosa with no significant histopathologic abnormality  - Negative for dysplasia         PERTINENT STUDIES Reviewed in EMR  5/15/2023 CT scan of abdomen  Impression  1.  Acute uncomplicated diverticulitis mid sigmoid colon.  2.  Hepatic steatosis, improved since prior.  3.  Interval decrease in overall stone burden in the right kidney. No hydronephrosis.      ROS: 10pt ROS performed and otherwise negative.    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     Kidney stones 9/16/2011       PREVIOUS ABDOMINAL/GYNECOLOGIC SURGERIES:  Past Surgical History:   Procedure Laterality Date     COLONOSCOPY N/A 5/15/2023    Procedure: COLONOSCOPY, WITH POLYPECTOMY AND BIOPSY;  Surgeon: Paula Fierro DO;  Location: [facility] OR     COLONOSCOPY WITH CO2 INSUFFLATION N/A 5/15/2023    Procedure: COLONOSCOPY, WITH CO2 INSUFFLATION;  Surgeon: Paula Fierro DO;  Location: [facility] OR     EXTRACORPOREAL SHOCK WAVE LITHOTRIPSY (ESWL)  .3.4.2016     GENITOURINARY SURGERY  Kidney Stones    21 Lithotripsy  procedures     HERNIA REPAIR  2017     JOINT REPLACEMENT Right 03/2020     ORTHOPEDIC SURGERY  2015    right shoulder rotor cuff     TONSILLECTOMY  2008         PERTINENT MEDICATIONS:  Current Outpatient Medications   Medication Sig Dispense Refill     amLODIPine (NORVASC) 5 MG tablet Take 1 tablet (5 mg) by mouth daily 90 tablet 3     metoprolol succinate ER (TOPROL XL) 50 MG 24 hr tablet TAKE 1 TABLET AT BEDTIME FOR BLOOD PRESSURE 90 tablet 2     psyllium (METAMUCIL/KONSYL) 58.6 % powder Take 6 g (1 teaspoonful) by mouth daily 425 g 1     tadalafil (CIALIS) 5 MG tablet Take 5 mg by mouth every 24 hours           SOCIAL HISTORY:  Social History     Socioeconomic History     Marital status:      Spouse name: Not on file     Number of children: Not on file     Years of education: Not on file     Highest education level: Not on file   Occupational History     Not on file   Tobacco Use     Smoking status: Never     Smokeless tobacco: Former     Tobacco comments:     NON SMOKING HOME   Vaping Use     Vaping status: Never Used   Substance and Sexual Activity     Alcohol use: Yes     Comment: OCC     Drug use: No     Sexual activity: Yes     Partners: Female     Birth control/protection: None   Other Topics Concern     Parent/sibling w/ CABG, MI or angioplasty before 65F 55M? No      Service No     Blood Transfusions No     Caffeine Concern No     Occupational Exposure No     Hobby Hazards No     Sleep Concern No     Stress Concern No     Weight Concern No     Special Diet No     Back Care No     Exercise No     Bike Helmet No     Seat Belt Yes     Self-Exams No   Social History Narrative     Not on file     Social Determinants of Health     Financial Resource Strain: Low Risk  (12/12/2023)    Financial Resource Strain      Within the past 12 months, have you or your family members you live with been unable to get utilities (heat, electricity) when it was really needed?: No   Food Insecurity: Low Risk   "(12/12/2023)    Food Insecurity      Within the past 12 months, did you worry that your food would run out before you got money to buy more?: No      Within the past 12 months, did the food you bought just not last and you didn t have money to get more?: No   Transportation Needs: Low Risk  (12/12/2023)    Transportation Needs      Within the past 12 months, has lack of transportation kept you from medical appointments, getting your medicines, non-medical meetings or appointments, work, or from getting things that you need?: No   Physical Activity: Not on file   Stress: Not on file   Social Connections: Not on file   Interpersonal Safety: Low Risk  (12/12/2023)    Interpersonal Safety      Do you feel physically and emotionally safe where you currently live?: Yes      Within the past 12 months, have you been hit, slapped, kicked or otherwise physically hurt by someone?: No      Within the past 12 months, have you been humiliated or emotionally abused in other ways by your partner or ex-partner?: No   Housing Stability: High Risk (12/12/2023)    Housing Stability      Do you have housing? : No      Are you worried about losing your housing?: No       FAMILY HISTORY:  Denies colon/panc/esophageal/other GI CA, no other Luke or other HPS-related Breanna. No IBD/celiac, no other AI/liver/thyroid disease.    Family History   Problem Relation Age of Onset     Cancer Father 68        pancreatic cancer     Diabetes Father      Other Cancer Father        PHYSICAL EXAMINATION:  Vitals reviewed  /84 (BP Location: Right arm, Patient Position: Sitting, Cuff Size: Adult Regular)   Pulse 76   Ht 1.753 m (5' 9\")   Wt 83.9 kg (185 lb)   BMI 27.32 kg/m      General: Patient appears well in no acute distress.    Skin: No visualized rash or lesions on visualized skin  HEENT:    EOMI, no erythema, sclera icterus or discharge noted.  Mouth mucosa intact, pink, moist  No cervical or supraclavicular lymphadenopathy. Thyroid gland not " enlarged.  Resp: breathing comfortably without accessory muscle usage, speaking in full sentences, no cough. Lung sounds clear  Card: Regular and rhythmic S1 and S2. No gallop or rub. No murmur.  No LE edema.  Abdomen: Active bowel sounds X 4 quadrants. Soft to palpation.  No guarding or rebound tenderness. Hutchison's sign negative.  MSK: Appears to have normal range of motion based on visualized movements  Neurologic: No apparent tremors, facial movements symmetric  Psych: affect normal, alert and oriented      ASSESSMENT/PLAN:    ICD-10-CM    1. Diverticular disease of colon  K57.30 psyllium (METAMUCIL/KONSYL) 58.6 % powder      2. Abdominal pain, generalized  R10.84       3. Fatty liver, alcoholic  K70.0       4. Abdominal bloating  R14.0       5. Acute diverticulitis  K57.92       6. Diarrhea, unspecified type  R19.7 psyllium (METAMUCIL/KONSYL) 58.6 % powder         53 year old male  presented to GI clinic for a follow-up on diverticular disease.  His last episode of acute diverticulitis, confirmed by CT scan, occurred in  in August 2023.  Uncomplicated case.  Patient received an antibiotic treatment, which helped.   Today, patient reported feeling well, he denies any GI symptoms.  Admits to occasional flareups of left-sided abdominal pain with associated abdominal bloating and diarrhea for a few days.  Stated he learned to abstain certain foods that aggravate his symptoms.  Had colonoscopy in May 2023 which revealed multiple small mouth diverticula in sigmoid colon.  There was questionable 20 mm polypoid lesion in the sigmoid colon, thought to be related to acute inflammation.  Will plan to have a follow-up study in 2028.  Patient concerned about abdominal bloating when taking a fiber supplement.  Suggested to switch from Metamucil to Citrucel or Benefiber.  Patient was educated on management of acute diverticulitis and the red flag symptoms that would prompt patient to see medical attention.  Patient verbalized  understanding and appreciation of care provided. Stated that all of the questions were answered to her/his satisfaction.  RTC in 1 year    Thank you for this consultation. It was a pleasure to participate in the care of this patient; please contact us with any further questions.    CARLOS Elizabeth, GALOP-C  Division of Gastroenterology  [facility], [place], [place]    This note was created with Dragon voice recognition software, and while reviewed for accuracy, inadvertent minor typographic errors may occur. Please contact the provider if you have any questions.       Again, thank you for allowing me to participate in the care of your patient.        Sincerely,        CARLOS ELIZABETH CNP

## 2024-10-01 ENCOUNTER — PATIENT OUTREACH (OUTPATIENT)
Dept: FAMILY MEDICINE | Facility: CLINIC | Age: 54
End: 2024-10-01
Payer: COMMERCIAL

## 2024-10-01 NOTE — TELEPHONE ENCOUNTER
Patient Quality Outreach    Patient is due for the following:   Physical Preventive Adult Physical      Topic Date Due    Hepatitis B Vaccine (1 of 3 - 19+ 3-dose series) Never done    Zoster (Shingles) Vaccine (1 of 2) Never done    Flu Vaccine (1) 09/01/2024    COVID-19 Vaccine (1 - 2024-25 season) Never done       Next Steps:   Schedule a Adult Preventative    Type of outreach:    Sent [employer] message.      Questions for provider review:    None           Nuha Quan, CMA

## 2024-11-12 ENCOUNTER — PATIENT OUTREACH (OUTPATIENT)
Dept: CARE COORDINATION | Facility: CLINIC | Age: 54
End: 2024-11-12
Payer: COMMERCIAL

## 2024-11-26 ENCOUNTER — PATIENT OUTREACH (OUTPATIENT)
Dept: CARE COORDINATION | Facility: CLINIC | Age: 54
End: 2024-11-26
Payer: COMMERCIAL

## 2024-12-09 DIAGNOSIS — I10 HYPERTENSION GOAL BP (BLOOD PRESSURE) < 140/90: ICD-10-CM

## 2024-12-09 RX ORDER — METOPROLOL SUCCINATE 50 MG/1
TABLET, EXTENDED RELEASE ORAL
Qty: 90 TABLET | Refills: 1 | Status: SHIPPED | OUTPATIENT
Start: 2024-12-09

## 2024-12-16 ENCOUNTER — TRANSFERRED RECORDS (OUTPATIENT)
Dept: HEALTH INFORMATION MANAGEMENT | Facility: CLINIC | Age: 54
End: 2024-12-16
Payer: COMMERCIAL

## 2025-02-08 ENCOUNTER — HEALTH MAINTENANCE LETTER (OUTPATIENT)
Age: 55
End: 2025-02-08

## 2025-02-13 ENCOUNTER — OFFICE VISIT (OUTPATIENT)
Dept: FAMILY MEDICINE | Facility: CLINIC | Age: 55
End: 2025-02-13
Payer: COMMERCIAL

## 2025-02-13 VITALS
HEART RATE: 86 BPM | DIASTOLIC BLOOD PRESSURE: 77 MMHG | BODY MASS INDEX: 28.44 KG/M2 | RESPIRATION RATE: 16 BRPM | OXYGEN SATURATION: 98 % | SYSTOLIC BLOOD PRESSURE: 131 MMHG | HEIGHT: 69 IN | WEIGHT: 192 LBS | TEMPERATURE: 98.6 F

## 2025-02-13 DIAGNOSIS — R94.31 ABNORMAL ELECTROCARDIOGRAM: ICD-10-CM

## 2025-02-13 DIAGNOSIS — G89.29 CHRONIC HIP PAIN, UNSPECIFIED LATERALITY: ICD-10-CM

## 2025-02-13 DIAGNOSIS — M25.559 CHRONIC HIP PAIN, UNSPECIFIED LATERALITY: ICD-10-CM

## 2025-02-13 DIAGNOSIS — I10 ESSENTIAL HYPERTENSION: ICD-10-CM

## 2025-02-13 DIAGNOSIS — I10 HYPERTENSION GOAL BP (BLOOD PRESSURE) < 140/90: ICD-10-CM

## 2025-02-13 DIAGNOSIS — R73.9 HYPERGLYCEMIA: ICD-10-CM

## 2025-02-13 DIAGNOSIS — Z01.818 PREOP GENERAL PHYSICAL EXAM: Primary | ICD-10-CM

## 2025-02-13 LAB
ALBUMIN UR-MCNC: NEGATIVE MG/DL
APPEARANCE UR: CLEAR
BILIRUB UR QL STRIP: NEGATIVE
COLOR UR AUTO: YELLOW
ERYTHROCYTE [DISTWIDTH] IN BLOOD BY AUTOMATED COUNT: 12.5 % (ref 10–15)
EST. AVERAGE GLUCOSE BLD GHB EST-MCNC: 148 MG/DL
GLUCOSE UR STRIP-MCNC: NEGATIVE MG/DL
HBA1C MFR BLD: 6.8 % (ref 0–5.6)
HCT VFR BLD AUTO: 46.5 % (ref 40–53)
HGB BLD-MCNC: 15.5 G/DL (ref 13.3–17.7)
HGB UR QL STRIP: NEGATIVE
KETONES UR STRIP-MCNC: NEGATIVE MG/DL
LEUKOCYTE ESTERASE UR QL STRIP: NEGATIVE
MCH RBC QN AUTO: 30.6 PG (ref 26.5–33)
MCHC RBC AUTO-ENTMCNC: 33.3 G/DL (ref 31.5–36.5)
MCV RBC AUTO: 92 FL (ref 78–100)
NITRATE UR QL: NEGATIVE
PH UR STRIP: 6.5 [PH] (ref 5–7)
PLATELET # BLD AUTO: 158 10E3/UL (ref 150–450)
RBC # BLD AUTO: 5.06 10E6/UL (ref 4.4–5.9)
RBC #/AREA URNS AUTO: NORMAL /HPF
SP GR UR STRIP: 1.02 (ref 1–1.03)
UROBILINOGEN UR STRIP-ACNC: 1 E.U./DL
WBC # BLD AUTO: 8.6 10E3/UL (ref 4–11)
WBC #/AREA URNS AUTO: NORMAL /HPF

## 2025-02-13 RX ORDER — OXYCODONE HYDROCHLORIDE 5 MG/1
5 TABLET ORAL EVERY 6 HOURS PRN
COMMUNITY
Start: 2024-12-16

## 2025-02-13 RX ORDER — AMLODIPINE BESYLATE 5 MG/1
5 TABLET ORAL DAILY
Qty: 90 TABLET | Refills: 0 | Status: SHIPPED | OUTPATIENT
Start: 2025-02-13

## 2025-02-13 ASSESSMENT — PAIN SCALES - GENERAL: PAINLEVEL_OUTOF10: NO PAIN (0)

## 2025-02-13 NOTE — PROGRESS NOTES
Preoperative Evaluation  [facility]  [address] [facility] [address]  Phone: [phone]  Primary Provider: Rj Mejia MD  Pre-op Performing Provider: Rj Mejia MD  Feb 13, 2025 2/13/2025   Surgical Information   What procedure is being done? Total Hip Replacement   Facility or Hospital where procedure/surgery [facility] be performed[facility]   Who is doing the procedure / surgery? Dr. Medina   Date of surgery / procedure: 3/11/2025   Time of surgery / procedure: 8am   Where do you plan to recover after surgery? at home with family     Fax number for surgical facility: [phone]    Assessment & Plan     The proposed surgical procedure is considered INTERMEDIATE risk.    ASSESSMENT / PLAN:  (Z01.818) Preop general physical exam  (primary encounter diagnosis)  Comment: generally healthy and normal exam. Denies chest pain or shortness of breath and did ok with knee replacement in past  Plan: EKG 12-lead complete w/read - Clinics, UA with         Microscopic reflex to Culture, CBC with         platelets        Ok pending echo.  Hold asa/nsaids and ALCOHOL one week before    (M25.559,  G89.29) Chronic hip pain, unspecified laterality  Comment: needs help  Plan: per ortho    (R73.9) Hyperglycemia  Plan: Hemoglobin A1c        Await labs. Lower carb diet/exercise and 5 lbs weight loss. Metformin if worse.     (R94.31) Abnormal electrocardiogram  Comment: no symptoms   Plan: Echocardiogram Complete        Check echo. If chest pain or shortness of breath to er    (I10) Hypertension goal BP (blood pressure) < 140/90  Comment: stable  Plan: BASIC METABOLIC PANEL, Echocardiogram Complete        Continue meds and self-monitor. Exercise and lower preservative diet.               - No identified additional risk factors other than previously addressed        Recommendation  Approval given to proceed with proposed procedure pending review of diagnostic  evaluation.    Hilda Velazquez is a 54 year old, presenting for the following:  Pre-Op Exam    Chronic hip right.   Had right  knee replaced 4 years ago.   Strong family history joint replacements.   No chest pain or shortness of breath.    No history bleeding issues or dvt.   No asa/nsaids. Occasionally ALCOHOL.   Lower  carb diet. No nausea, vomiting or diarrhea or black/bloody stoosl. No urine changes or hematuria. No fevers or chills or cold symptoms.   Follow-up htn,high triglycerides,kidney stones and hyperglycemia.   Born with one kidney      2/13/2025     4:47 PM   Additional Questions   Roomed by Nuha   Accompanied by self         2/13/2025   Forms   Any forms needing to be completed Yes         2/13/2025     4:47 PM   Patient Reported Additional Medications   Patient reports taking the following new medications n/a     HPI related to upcoming procedure: chronic hip pain        2/13/2025   Pre-Op Questionnaire   Have you ever had a heart attack or stroke? No   Have you ever had surgery on your heart or blood vessels, such as a stent placement, a coronary artery bypass, or surgery on an artery in your head, neck, heart, or legs? No   Do you have chest pain with activity? No   Do you have a history of heart failure? No   Do you currently have a cold, bronchitis or symptoms of other infection? No   Do you have a cough, shortness of breath, or wheezing? No   Do you or anyone in your family have previous history of blood clots? No   Do you or does anyone in your family have a serious bleeding problem such as prolonged bleeding following surgeries or cuts? No   Have you ever had problems with anemia or been told to take iron pills? No   Have you had any abnormal blood loss such as black, tarry or bloody stools? No   Have you ever had a blood transfusion? No   Are you willing to have a blood transfusion if it is medically needed before, during, or after your surgery? Yes   Have you or any of your relatives  ever had problems with anesthesia? No   Do you have sleep apnea, excessive snoring or daytime drowsiness? No   Do you have any artifical heart valves or other implanted medical devices like a pacemaker, defibrillator, or continuous glucose monitor? No   Do you have artificial joints? (!) YES   Are you allergic to latex? No     Health Care Directive  Patient does not have a Health Care Directive: Discussed advance care planning with patient; however, patient declined at this time.    Preoperative Review of    reviewed - controlled substances reflected in medication list.          Patient Active Problem List    Diagnosis Date Noted    Other male erectile dysfunction 11/11/2016     Priority: Medium    Hyperglycemia 11/11/2016     Priority: Medium    Special screening for malignant neoplasm of prostate 10/22/2015     Priority: Medium    Rotator cuff tear 05/12/2015     Priority: Medium    Hypertension goal BP (blood pressure) < 140/90 10/09/2014     Priority: Medium    Microhematuria 10/09/2014     Priority: Medium    Elevated blood pressure reading without diagnosis of hypertension 09/19/2012     Priority: Medium    Pain in joint, lower leg 10/20/2011     Priority: Medium    Kidney congenitally absent, 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     Priority: Medium    Kidney stones 09/16/2011     Priority: Medium    CARDIOVASCULAR SCREENING; LDL GOAL LESS THAN 160 10/31/2010     Priority: Medium      Past Medical History:   Diagnosis Date    Hypertension goal BP (blood pressure) < 140/90 10/9/2014    Kidney congenitally absent, 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    Kidney stones 9/16/2011     Past Surgical History:   Procedure Laterality Date    COLONOSCOPY N/A 5/15/2023    Procedure: COLONOSCOPY, WITH POLYPECTOMY AND BIOPSY;  Surgeon: Paula Fierro DO;  Location: [facility] OR    COLONOSCOPY WITH CO2 INSUFFLATION N/A 5/15/2023    Procedure: COLONOSCOPY, WITH CO2 INSUFFLATION;  Surgeon: Paula Fierro DO;  Location: [facility] OR    EXTRACORPOREAL SHOCK WAVE  "LITHOTRIPSY (ESWL)  .3.4.2016    GENITOURINARY SURGERY  Kidney Stones    21 Lithotripsy procedures    HERNIA REPAIR  2017    JOINT REPLACEMENT Right 03/2020    ORTHOPEDIC SURGERY  2015    right shoulder rotor cuff    TONSILLECTOMY  2008     Current Outpatient Medications   Medication Sig Dispense Refill    amLODIPine (NORVASC) 5 MG tablet Take 1 tablet (5 mg) by mouth daily 90 tablet 3    metoprolol succinate ER (TOPROL XL) 50 MG 24 hr tablet TAKE 1 TABLET AT BEDTIME FOR BLOOD PRESSURE 90 tablet 1    oxyCODONE (ROXICODONE) 5 MG tablet Take 5 mg by mouth every 6 hours as needed.      tadalafil (CIALIS) 5 MG tablet Take 5 mg by mouth every 24 hours      psyllium (METAMUCIL/KONSYL) 58.6 % powder Take 6 g (1 teaspoonful) by mouth daily 425 g 1       Allergies   Allergen Reactions    Cefazolin Anaphylaxis    Cephalosporins Anaphylaxis     Severe reaction. anaphylaxis    Amoxicillin-Pot Clavulanate Unknown    Azo [Phenazopyridine] Hives    Flomax [Tamsulosin] Hives    Levaquin [Levofloxacin Hemihydrate] Hives    Amoxicillin Hives    Detrol [Tolterodine Tartrate] Hives        Social History     Tobacco Use    Smoking status: Never    Smokeless tobacco: Former    Tobacco comments:     NON SMOKING HOME   Substance Use Topics    Alcohol use: Yes     Comment: OCC       History   Drug Use No               Objective    /77   Pulse 86   Temp 98.6  F (37  C) (Tympanic)   Resp 16   Ht 1.753 m (5' 9\")   Wt 87.1 kg (192 lb)   SpO2 98%   BMI 28.35 kg/m      Physical Exam  GENERAL: alert and no distress  EYES: Eyes grossly normal to inspection, PERRL and conjunctivae and sclerae normal  HENT: ear canals and TM's normal, nose and mouth without ulcers or lesions  NECK: no adenopathy, no asymmetry, masses, or scars  RESP: lungs clear to auscultation - no rales, rhonchi or wheezes  BREAST: normal without masses, tenderness or nipple discharge and no palpable axillary masses or adenopathy  CV: regular rate and rhythm, normal S1 " "S2, no S3 or S4, no murmur, click or rub, no peripheral edema   ABDOMEN: soft, nontender, no hepatosplenomegaly, no masses and bowel sounds normal  MS: no gross musculoskeletal defects noted, no edema  SKIN: no suspicious lesions or rashes  NEURO: Normal strength and tone, mentation intact and speech normal  PSYCH: mentation appears normal, affect normal/bright    No results for input(s): \"HGB\", \"PLT\", \"INR\", \"NA\", \"POTASSIUM\", \"CR\", \"A1C\" in the last 8760 hours.     Diagnostics  Labs pending at this time.  Results will be reviewed when available.  Cbc/urine/hgba1c and renal panel   EKG: appears normal, NSR, normal axis, normal intervals, no acute ST/T changes c/w ischemia, no LVH by voltage criteria, nonspecific ST-T changes    Revised Cardiac Risk Index (RCRI)  The patient has the following serious cardiovascular risks for perioperative complications:   - No serious cardiac risks = 0 points     RCRI Interpretation: 1 point: Class II (low risk - 0.9% complication rate)         Signed Electronically by: Rj Mejia MD  A copy of this evaluation report is provided to the requesting physician.         "

## 2025-02-13 NOTE — PATIENT INSTRUCTIONS
How to Take Your Medication Before Surgery  Preoperative Medication Instructions          Patient Education   Preparing for Your Surgery  For Adults  Getting started  In most cases, a nurse will call to review your health history and instructions. They will give you an arrival time based on your scheduled surgery time. Please be ready to share:  Your doctor's clinic name and phone number  Your medical, surgical, and anesthesia history  A list of allergies and sensitivities  A list of medicines, including herbal treatments and over-the-counter drugs  Whether the patient has a legal guardian (ask how to send us the papers in advance)  Note: You may not receive a call if you were seen at our PAC (Preoperative [facility] Center).  Please tell us if you're pregnant--or if there's any chance you might be pregnant. Some surgeries may injure a fetus (unborn baby), so they require a pregnancy test. Surgeries that are safe for a fetus don't always need a test, and you can choose whether to have one.   Preparing for surgery  Within 10 to 30 days of surgery: Have a pre-op exam (sometimes called an H&P, or History and Physical). This can be done at a clinic or pre-operative center.  If you're having a , you may not need this exam. Talk to your care team.  At your pre-op exam, talk to your care team about all medicines you take. (This includes CBD oil and any drugs, such as THC, marijuana, and other forms of cannabis.) If you need to stop any medicine before surgery, ask when to start taking it again.  This is for your safety. Many medicines and drugs can make you bleed too much during surgery. Some change how well surgery (anesthesia) drugs work.  Call your insurance company to let them know you're having surgery. (If you don't have insurance, call [phone].)  Call your clinic if there's any change in your health. This includes a scrape or scratch near the surgery site, or any signs of a cold (sore throat, runny  nose, cough, rash, fever).  Eating and drinking guidelines  For your safety: Unless your surgeon tells you otherwise, follow the guidelines below.  Eat and drink as normal until 8 hours before you arrive for surgery. After that, no food or milk. You can spit out gum when you arrive.  Drink clear liquids until 2 hours before you arrive. These are liquids you can see through, like water, Gatorade, and Propel Water. They also include plain black coffee and tea (no cream or milk).  No alcohol for 24 hours before you arrive. The night before surgery, stop any drinks that contain THC.  If your care team tells you to take medicine on the morning of surgery, it's okay to take it with a sip of water. No other medicines or drugs are allowed (including CBD oil)--follow your care team's instructions.  If you have questions the day of surgery, call your hospital or surgery center.   Preventing infection  Shower or bathe the night before and the morning of surgery. Follow the instructions your clinic gave you. (If no instructions, use regular soap.)  Don't shave or clip hair near your surgery site. We'll remove the hair if needed.  Don't smoke or vape the morning of surgery. No chewing tobacco for 6 hours before you arrive. A nicotine patch is okay. You may spit out nicotine gum when you arrive.  For some surgeries, the surgeon will tell you to fully quit smoking and nicotine.  We will make every effort to keep you safe from infection. We will:  Clean our hands often with soap and water (or an alcohol-based hand rub).  Clean the skin at your surgery site with a special soap that kills germs.  Give you a special gown to keep you warm. (Cold raises the risk of infection.)  Wear hair covers, masks, gowns, and gloves during surgery.  Give antibiotic medicine, if prescribed. Not all surgeries need this medicine.  What to bring on the day of surgery  Photo ID and insurance card  Copy of your health care directive, if you have  one  Glasses and hearing aids (bring cases)  You can't wear contacts during surgery  Inhaler and eye drops, if you use them (tell us about these when you arrive)  CPAP machine or breathing device, if you use them  A few personal items, if spending the night  If you have . . .  A pacemaker, ICD (cardiac defibrillator), or other implant: Bring the ID card.  An implanted stimulator: Bring the remote control.  A legal guardian: Bring a copy of the certified (court-stamped) guardianship papers.  Please remove any jewelry, including body piercings. Leave jewelry and other valuables at home.  If you're going home the day of surgery  You must have a responsible adult drive you home. They should stay with you overnight as well.  If you don't have someone to stay with you, and you aren't safe to go home alone, we may keep you overnight. Insurance often won't pay for this.  After surgery  If it's hard to control your pain or you need more pain medicine, please call your surgeon's office.  Questions?   If you have any questions for your care team, list them here:   ____________________________________________________________________________________________________________________________________________________________________________________________________________________________________________________________  For informational purposes only. Not to replace the advice of your health care provider. Copyright   2003, 2019 [facility]. All rights reserved. Clinically reviewed by Yoshi Crouch MD. [employer] [id number] - REV 08/24.

## 2025-02-23 DIAGNOSIS — I10 ESSENTIAL HYPERTENSION: ICD-10-CM

## 2025-02-24 ENCOUNTER — HOSPITAL ENCOUNTER (OUTPATIENT)
Dept: CARDIOLOGY | Facility: CLINIC | Age: 55
Discharge: HOME | End: 2025-02-24
Attending: FAMILY MEDICINE | Admitting: FAMILY MEDICINE
Payer: COMMERCIAL

## 2025-02-24 DIAGNOSIS — R94.31 ABNORMAL ELECTROCARDIOGRAM: ICD-10-CM

## 2025-02-24 DIAGNOSIS — I10 HYPERTENSION GOAL BP (BLOOD PRESSURE) < 140/90: ICD-10-CM

## 2025-02-24 LAB — LVEF ECHO: NORMAL

## 2025-02-24 PROCEDURE — 93306 TTE W/DOPPLER COMPLETE: CPT | Mod: 26 | Performed by: INTERNAL MEDICINE

## 2025-02-24 PROCEDURE — 93306 TTE W/DOPPLER COMPLETE: CPT

## 2025-02-24 RX ORDER — AMLODIPINE BESYLATE 5 MG/1
5 TABLET ORAL DAILY
Qty: 90 TABLET | Refills: 0 | Status: SHIPPED | OUTPATIENT
Start: 2025-02-24

## 2025-02-24 NOTE — TELEPHONE ENCOUNTER
New pharmacy for this refill request. Remaining refills sent to this new pharmacy.    Doreen Richardson BSN, RN

## 2025-03-11 ENCOUNTER — TRANSFERRED RECORDS (OUTPATIENT)
Dept: HEALTH INFORMATION MANAGEMENT | Facility: CLINIC | Age: 55
End: 2025-03-11

## 2025-03-31 ENCOUNTER — TRANSFERRED RECORDS (OUTPATIENT)
Dept: HEALTH INFORMATION MANAGEMENT | Facility: CLINIC | Age: 55
End: 2025-03-31
Payer: COMMERCIAL

## 2025-04-28 ENCOUNTER — TRANSFERRED RECORDS (OUTPATIENT)
Dept: HEALTH INFORMATION MANAGEMENT | Facility: CLINIC | Age: 55
End: 2025-04-28
Payer: COMMERCIAL

## 2025-06-01 DIAGNOSIS — I10 ESSENTIAL HYPERTENSION: ICD-10-CM

## 2025-06-02 RX ORDER — AMLODIPINE BESYLATE 5 MG/1
5 TABLET ORAL DAILY
Qty: 90 TABLET | Refills: 2 | Status: SHIPPED | OUTPATIENT
Start: 2025-06-02

## 2025-06-10 ENCOUNTER — PATIENT OUTREACH (OUTPATIENT)
Dept: CARE COORDINATION | Facility: CLINIC | Age: 55
End: 2025-06-10
Payer: COMMERCIAL

## 2025-06-16 DIAGNOSIS — I10 HYPERTENSION GOAL BP (BLOOD PRESSURE) < 140/90: ICD-10-CM

## 2025-06-16 RX ORDER — METOPROLOL SUCCINATE 50 MG/1
TABLET, EXTENDED RELEASE ORAL
Qty: 90 TABLET | Refills: 1 | Status: SHIPPED | OUTPATIENT
Start: 2025-06-16

## (undated) DEVICE — KIT ENDO FIRST STEP DISINFECTANT 200ML W/POUCH EP-4

## (undated) DEVICE — PAD CHUX UNDERPAD 23X24" 7136

## (undated) DEVICE — PREP CHLORAPREP 26ML TINTED ORANGE  260815